# Patient Record
Sex: FEMALE | Race: WHITE | Employment: OTHER | URBAN - METROPOLITAN AREA
[De-identification: names, ages, dates, MRNs, and addresses within clinical notes are randomized per-mention and may not be internally consistent; named-entity substitution may affect disease eponyms.]

---

## 2023-07-25 ENCOUNTER — APPOINTMENT (OUTPATIENT)
Dept: GENERAL RADIOLOGY | Age: 78
DRG: 291 | End: 2023-07-25
Payer: MEDICARE

## 2023-07-25 ENCOUNTER — HOSPITAL ENCOUNTER (INPATIENT)
Age: 78
LOS: 10 days | Discharge: NURSING, REHAB OR LONG-TERM CARE | DRG: 291 | End: 2023-08-04
Attending: STUDENT IN AN ORGANIZED HEALTH CARE EDUCATION/TRAINING PROGRAM | Admitting: STUDENT IN AN ORGANIZED HEALTH CARE EDUCATION/TRAINING PROGRAM
Payer: MEDICARE

## 2023-07-25 ENCOUNTER — APPOINTMENT (OUTPATIENT)
Dept: CT IMAGING | Age: 78
DRG: 291 | End: 2023-07-25
Payer: MEDICARE

## 2023-07-25 DIAGNOSIS — J90 PLEURAL EFFUSION: ICD-10-CM

## 2023-07-25 DIAGNOSIS — I48.91 ATRIAL FIBRILLATION WITH RVR (HCC): ICD-10-CM

## 2023-07-25 DIAGNOSIS — R09.02 HYPOXIA: Primary | ICD-10-CM

## 2023-07-25 PROBLEM — I31.39 PERICARDIAL EFFUSION: Status: ACTIVE | Noted: 2023-07-25

## 2023-07-25 PROBLEM — J96.01 ACUTE RESPIRATORY FAILURE WITH HYPOXIA (HCC): Status: ACTIVE | Noted: 2023-07-25

## 2023-07-25 PROBLEM — R65.10 SIRS (SYSTEMIC INFLAMMATORY RESPONSE SYNDROME) (HCC): Status: ACTIVE | Noted: 2023-07-25

## 2023-07-25 LAB
ALBUMIN SERPL-MCNC: 3.9 G/DL (ref 3.4–5)
ALBUMIN/GLOB SERPL: 1.2 {RATIO} (ref 1.1–2.2)
ALP SERPL-CCNC: 153 U/L (ref 40–129)
ALT SERPL-CCNC: 15 U/L (ref 10–40)
ANION GAP SERPL CALCULATED.3IONS-SCNC: 14 MMOL/L (ref 3–16)
APTT BLD: 29.4 SEC (ref 22.7–35.9)
AST SERPL-CCNC: 20 U/L (ref 15–37)
BACTERIA URNS QL MICRO: ABNORMAL /HPF
BASOPHILS # BLD: 0 K/UL (ref 0–0.2)
BASOPHILS NFR BLD: 0.4 %
BILIRUB SERPL-MCNC: 1 MG/DL (ref 0–1)
BILIRUB UR QL STRIP.AUTO: NEGATIVE
BUN SERPL-MCNC: 22 MG/DL (ref 7–20)
CALCIUM SERPL-MCNC: 9.8 MG/DL (ref 8.3–10.6)
CHLORIDE SERPL-SCNC: 106 MMOL/L (ref 99–110)
CLARITY UR: CLEAR
CO2 SERPL-SCNC: 22 MMOL/L (ref 21–32)
COLOR UR: YELLOW
CREAT SERPL-MCNC: 0.6 MG/DL (ref 0.6–1.2)
DEPRECATED RDW RBC AUTO: 15.5 % (ref 12.4–15.4)
EKG ATRIAL RATE: 227 BPM
EKG DIAGNOSIS: NORMAL
EKG Q-T INTERVAL: 236 MS
EKG QRS DURATION: 70 MS
EKG QTC CALCULATION (BAZETT): 417 MS
EKG R AXIS: 46 DEGREES
EKG T AXIS: 173 DEGREES
EKG VENTRICULAR RATE: 188 BPM
EOSINOPHIL # BLD: 0.1 K/UL (ref 0–0.6)
EOSINOPHIL NFR BLD: 0.6 %
EPI CELLS #/AREA URNS AUTO: 3 /HPF (ref 0–5)
GFR SERPLBLD CREATININE-BSD FMLA CKD-EPI: >60 ML/MIN/{1.73_M2}
GLUCOSE SERPL-MCNC: 129 MG/DL (ref 70–99)
GLUCOSE UR STRIP.AUTO-MCNC: NEGATIVE MG/DL
HCT VFR BLD AUTO: 51.1 % (ref 36–48)
HGB BLD-MCNC: 16.4 G/DL (ref 12–16)
HGB UR QL STRIP.AUTO: ABNORMAL
HYALINE CASTS #/AREA URNS AUTO: 2 /LPF (ref 0–8)
HYALINE CASTS: PRESENT
INR PPP: 1.13 (ref 0.84–1.16)
KETONES UR STRIP.AUTO-MCNC: NEGATIVE MG/DL
LACTATE BLDV-SCNC: 1.6 MMOL/L (ref 0.4–1.9)
LACTATE BLDV-SCNC: 1.8 MMOL/L (ref 0.4–1.9)
LEUKOCYTE ESTERASE UR QL STRIP.AUTO: NEGATIVE
LYMPHOCYTES # BLD: 1.6 K/UL (ref 1–5.1)
LYMPHOCYTES NFR BLD: 17.4 %
MAGNESIUM SERPL-MCNC: 1.8 MG/DL (ref 1.8–2.4)
MCH RBC QN AUTO: 31.6 PG (ref 26–34)
MCHC RBC AUTO-ENTMCNC: 32.2 G/DL (ref 31–36)
MCV RBC AUTO: 98.3 FL (ref 80–100)
MONOCYTES # BLD: 0.7 K/UL (ref 0–1.3)
MONOCYTES NFR BLD: 7.6 %
NEUTROPHILS # BLD: 6.9 K/UL (ref 1.7–7.7)
NEUTROPHILS NFR BLD: 74 %
NITRITE UR QL STRIP.AUTO: NEGATIVE
NT-PROBNP SERPL-MCNC: 1160 PG/ML (ref 0–449)
PH UR STRIP.AUTO: 5.5 [PH] (ref 5–8)
PLATELET # BLD AUTO: 236 K/UL (ref 135–450)
PMV BLD AUTO: 8 FL (ref 5–10.5)
POTASSIUM SERPL-SCNC: 4.2 MMOL/L (ref 3.5–5.1)
PROT SERPL-MCNC: 7.2 G/DL (ref 6.4–8.2)
PROT UR STRIP.AUTO-MCNC: 30 MG/DL
PROTHROMBIN TIME: 14.5 SEC (ref 11.5–14.8)
RBC # BLD AUTO: 5.2 M/UL (ref 4–5.2)
RBC CLUMPS #/AREA URNS AUTO: 18 /HPF (ref 0–4)
SODIUM SERPL-SCNC: 142 MMOL/L (ref 136–145)
SP GR UR STRIP.AUTO: >=1.03 (ref 1–1.03)
TROPONIN, HIGH SENSITIVITY: 19 NG/L (ref 0–14)
TROPONIN, HIGH SENSITIVITY: 20 NG/L (ref 0–14)
UA COMPLETE W REFLEX CULTURE PNL UR: ABNORMAL
UA DIPSTICK W REFLEX MICRO PNL UR: YES
URN SPEC COLLECT METH UR: ABNORMAL
UROBILINOGEN UR STRIP-ACNC: 1 E.U./DL
WBC # BLD AUTO: 9.3 K/UL (ref 4–11)
WBC #/AREA URNS AUTO: 1 /HPF (ref 0–5)

## 2023-07-25 PROCEDURE — 83735 ASSAY OF MAGNESIUM: CPT

## 2023-07-25 PROCEDURE — 6360000002 HC RX W HCPCS: Performed by: STUDENT IN AN ORGANIZED HEALTH CARE EDUCATION/TRAINING PROGRAM

## 2023-07-25 PROCEDURE — 2500000003 HC RX 250 WO HCPCS

## 2023-07-25 PROCEDURE — 99285 EMERGENCY DEPT VISIT HI MDM: CPT

## 2023-07-25 PROCEDURE — 2500000003 HC RX 250 WO HCPCS: Performed by: STUDENT IN AN ORGANIZED HEALTH CARE EDUCATION/TRAINING PROGRAM

## 2023-07-25 PROCEDURE — 2700000000 HC OXYGEN THERAPY PER DAY

## 2023-07-25 PROCEDURE — 84484 ASSAY OF TROPONIN QUANT: CPT

## 2023-07-25 PROCEDURE — 93005 ELECTROCARDIOGRAM TRACING: CPT | Performed by: STUDENT IN AN ORGANIZED HEALTH CARE EDUCATION/TRAINING PROGRAM

## 2023-07-25 PROCEDURE — 83605 ASSAY OF LACTIC ACID: CPT

## 2023-07-25 PROCEDURE — 2580000003 HC RX 258: Performed by: STUDENT IN AN ORGANIZED HEALTH CARE EDUCATION/TRAINING PROGRAM

## 2023-07-25 PROCEDURE — 83880 ASSAY OF NATRIURETIC PEPTIDE: CPT

## 2023-07-25 PROCEDURE — 6360000004 HC RX CONTRAST MEDICATION: Performed by: STUDENT IN AN ORGANIZED HEALTH CARE EDUCATION/TRAINING PROGRAM

## 2023-07-25 PROCEDURE — 1200000000 HC SEMI PRIVATE

## 2023-07-25 PROCEDURE — 6360000002 HC RX W HCPCS: Performed by: PHYSICIAN ASSISTANT

## 2023-07-25 PROCEDURE — 96376 TX/PRO/DX INJ SAME DRUG ADON: CPT

## 2023-07-25 PROCEDURE — 93010 ELECTROCARDIOGRAM REPORT: CPT | Performed by: INTERNAL MEDICINE

## 2023-07-25 PROCEDURE — 94761 N-INVAS EAR/PLS OXIMETRY MLT: CPT

## 2023-07-25 PROCEDURE — 85610 PROTHROMBIN TIME: CPT

## 2023-07-25 PROCEDURE — 6370000000 HC RX 637 (ALT 250 FOR IP): Performed by: NURSE PRACTITIONER

## 2023-07-25 PROCEDURE — 80053 COMPREHEN METABOLIC PANEL: CPT

## 2023-07-25 PROCEDURE — 84443 ASSAY THYROID STIM HORMONE: CPT

## 2023-07-25 PROCEDURE — 85025 COMPLETE CBC W/AUTO DIFF WBC: CPT

## 2023-07-25 PROCEDURE — 96372 THER/PROPH/DIAG INJ SC/IM: CPT

## 2023-07-25 PROCEDURE — 94760 N-INVAS EAR/PLS OXIMETRY 1: CPT

## 2023-07-25 PROCEDURE — 96374 THER/PROPH/DIAG INJ IV PUSH: CPT

## 2023-07-25 PROCEDURE — 94640 AIRWAY INHALATION TREATMENT: CPT

## 2023-07-25 PROCEDURE — 81001 URINALYSIS AUTO W/SCOPE: CPT

## 2023-07-25 PROCEDURE — 36415 COLL VENOUS BLD VENIPUNCTURE: CPT

## 2023-07-25 PROCEDURE — 87641 MR-STAPH DNA AMP PROBE: CPT

## 2023-07-25 PROCEDURE — 87040 BLOOD CULTURE FOR BACTERIA: CPT

## 2023-07-25 PROCEDURE — 71260 CT THORAX DX C+: CPT

## 2023-07-25 PROCEDURE — 85730 THROMBOPLASTIN TIME PARTIAL: CPT

## 2023-07-25 PROCEDURE — 71045 X-RAY EXAM CHEST 1 VIEW: CPT

## 2023-07-25 PROCEDURE — 87449 NOS EACH ORGANISM AG IA: CPT

## 2023-07-25 RX ORDER — MAGNESIUM SULFATE IN WATER 40 MG/ML
2000 INJECTION, SOLUTION INTRAVENOUS PRN
Status: DISCONTINUED | OUTPATIENT
Start: 2023-07-25 | End: 2023-08-04 | Stop reason: HOSPADM

## 2023-07-25 RX ORDER — ENOXAPARIN SODIUM 100 MG/ML
40 INJECTION SUBCUTANEOUS DAILY
Status: DISCONTINUED | OUTPATIENT
Start: 2023-07-26 | End: 2023-07-26

## 2023-07-25 RX ORDER — FUROSEMIDE 10 MG/ML
40 INJECTION INTRAMUSCULAR; INTRAVENOUS 2 TIMES DAILY
Status: DISCONTINUED | OUTPATIENT
Start: 2023-07-25 | End: 2023-07-28

## 2023-07-25 RX ORDER — ACETAMINOPHEN 325 MG/1
650 TABLET ORAL EVERY 6 HOURS PRN
Status: DISCONTINUED | OUTPATIENT
Start: 2023-07-25 | End: 2023-08-04 | Stop reason: HOSPADM

## 2023-07-25 RX ORDER — POTASSIUM CHLORIDE 7.45 MG/ML
10 INJECTION INTRAVENOUS PRN
Status: DISCONTINUED | OUTPATIENT
Start: 2023-07-25 | End: 2023-08-04 | Stop reason: HOSPADM

## 2023-07-25 RX ORDER — DILTIAZEM HYDROCHLORIDE 5 MG/ML
10 INJECTION INTRAVENOUS ONCE
Status: COMPLETED | OUTPATIENT
Start: 2023-07-25 | End: 2023-07-25

## 2023-07-25 RX ORDER — DILTIAZEM HCL IN NACL,ISO-OSM 125 MG/125
2.5-15 PLASTIC BAG, INJECTION (ML) INTRAVENOUS CONTINUOUS
Status: DISCONTINUED | OUTPATIENT
Start: 2023-07-25 | End: 2023-08-02

## 2023-07-25 RX ORDER — POTASSIUM CHLORIDE 20 MEQ/1
40 TABLET, EXTENDED RELEASE ORAL PRN
Status: DISCONTINUED | OUTPATIENT
Start: 2023-07-25 | End: 2023-08-04 | Stop reason: HOSPADM

## 2023-07-25 RX ORDER — ONDANSETRON 4 MG/1
4 TABLET, ORALLY DISINTEGRATING ORAL EVERY 8 HOURS PRN
Status: DISCONTINUED | OUTPATIENT
Start: 2023-07-25 | End: 2023-08-04 | Stop reason: HOSPADM

## 2023-07-25 RX ORDER — DILTIAZEM HYDROCHLORIDE 5 MG/ML
INJECTION INTRAVENOUS
Status: COMPLETED
Start: 2023-07-25 | End: 2023-07-25

## 2023-07-25 RX ORDER — ONDANSETRON 2 MG/ML
4 INJECTION INTRAMUSCULAR; INTRAVENOUS EVERY 6 HOURS PRN
Status: DISCONTINUED | OUTPATIENT
Start: 2023-07-25 | End: 2023-08-04 | Stop reason: HOSPADM

## 2023-07-25 RX ORDER — POLYETHYLENE GLYCOL 3350 17 G/17G
17 POWDER, FOR SOLUTION ORAL DAILY PRN
Status: DISCONTINUED | OUTPATIENT
Start: 2023-07-25 | End: 2023-08-04 | Stop reason: HOSPADM

## 2023-07-25 RX ORDER — ACETAMINOPHEN 650 MG/1
650 SUPPOSITORY RECTAL EVERY 6 HOURS PRN
Status: DISCONTINUED | OUTPATIENT
Start: 2023-07-25 | End: 2023-08-04 | Stop reason: HOSPADM

## 2023-07-25 RX ORDER — SODIUM CHLORIDE 0.9 % (FLUSH) 0.9 %
5-40 SYRINGE (ML) INJECTION EVERY 12 HOURS SCHEDULED
Status: DISCONTINUED | OUTPATIENT
Start: 2023-07-25 | End: 2023-08-04 | Stop reason: HOSPADM

## 2023-07-25 RX ORDER — IPRATROPIUM BROMIDE AND ALBUTEROL SULFATE 2.5; .5 MG/3ML; MG/3ML
1 SOLUTION RESPIRATORY (INHALATION) EVERY 4 HOURS PRN
Status: DISCONTINUED | OUTPATIENT
Start: 2023-07-25 | End: 2023-07-25

## 2023-07-25 RX ORDER — ENOXAPARIN SODIUM 100 MG/ML
1 INJECTION SUBCUTANEOUS ONCE
Status: COMPLETED | OUTPATIENT
Start: 2023-07-25 | End: 2023-07-25

## 2023-07-25 RX ORDER — SODIUM CHLORIDE 0.9 % (FLUSH) 0.9 %
5-40 SYRINGE (ML) INJECTION PRN
Status: DISCONTINUED | OUTPATIENT
Start: 2023-07-25 | End: 2023-08-04 | Stop reason: HOSPADM

## 2023-07-25 RX ORDER — SODIUM CHLORIDE 9 MG/ML
INJECTION, SOLUTION INTRAVENOUS PRN
Status: DISCONTINUED | OUTPATIENT
Start: 2023-07-25 | End: 2023-08-04 | Stop reason: HOSPADM

## 2023-07-25 RX ORDER — IPRATROPIUM BROMIDE AND ALBUTEROL SULFATE 2.5; .5 MG/3ML; MG/3ML
1 SOLUTION RESPIRATORY (INHALATION)
Status: DISCONTINUED | OUTPATIENT
Start: 2023-07-26 | End: 2023-07-28

## 2023-07-25 RX ORDER — ALBUTEROL SULFATE 2.5 MG/3ML
2.5 SOLUTION RESPIRATORY (INHALATION)
Status: DISCONTINUED | OUTPATIENT
Start: 2023-07-25 | End: 2023-08-04 | Stop reason: HOSPADM

## 2023-07-25 RX ADMIN — DILTIAZEM HYDROCHLORIDE 10 MG: 5 INJECTION INTRAVENOUS at 16:51

## 2023-07-25 RX ADMIN — IOPAMIDOL 75 ML: 755 INJECTION, SOLUTION INTRAVENOUS at 17:14

## 2023-07-25 RX ADMIN — VANCOMYCIN HYDROCHLORIDE 750 MG: 750 INJECTION, POWDER, LYOPHILIZED, FOR SOLUTION INTRAVENOUS at 22:32

## 2023-07-25 RX ADMIN — ENOXAPARIN SODIUM 50 MG: 100 INJECTION SUBCUTANEOUS at 18:20

## 2023-07-25 RX ADMIN — IPRATROPIUM BROMIDE AND ALBUTEROL SULFATE 1 DOSE: .5; 3 SOLUTION RESPIRATORY (INHALATION) at 21:53

## 2023-07-25 RX ADMIN — DILTIAZEM HYDROCHLORIDE 10 MG: 5 INJECTION INTRAVENOUS at 15:11

## 2023-07-25 RX ADMIN — Medication 10 ML: at 20:53

## 2023-07-25 RX ADMIN — CEFTRIAXONE SODIUM 1000 MG: 1 INJECTION, POWDER, FOR SOLUTION INTRAMUSCULAR; INTRAVENOUS at 19:30

## 2023-07-25 RX ADMIN — AZITHROMYCIN MONOHYDRATE 500 MG: 500 INJECTION, POWDER, LYOPHILIZED, FOR SOLUTION INTRAVENOUS at 20:06

## 2023-07-25 RX ADMIN — Medication 5 MG/HR: at 15:13

## 2023-07-25 RX ADMIN — FUROSEMIDE 40 MG: 10 INJECTION, SOLUTION INTRAMUSCULAR; INTRAVENOUS at 20:53

## 2023-07-25 ASSESSMENT — PAIN - FUNCTIONAL ASSESSMENT: PAIN_FUNCTIONAL_ASSESSMENT: 0-10

## 2023-07-25 ASSESSMENT — PAIN SCALES - GENERAL: PAINLEVEL_OUTOF10: 0

## 2023-07-25 NOTE — ED NOTES
ED TO INPATIENT SBAR HANDOFF    Patient Name: Oneita Gowers   :  1945  69 y.o. MRN:  [id number]  Preferred Name  [facility]  ED Room #:  ED-0030/30  Family/Caregiver Present no   Restraints no   Sitter no   Sepsis Risk Score Sepsis Risk Score: 3.71    Situation  Code Status: Full Code No additional code details. Allergies: Patient has no known allergies. Weight: Patient Vitals for the past 96 hrs (Last 3 readings):   Weight   23 1432 110 lb (49.9 kg)     Arrived from: home  Chief Complaint:   Chief Complaint   Patient presents with    Shortness of Breath     SOB for approx 3 week & swelling to right leg for approx 3 days. Denies cardiac/pulm hx. Noted afib RVR in triage. Hospital Problem/Diagnosis:  Principal Problem:    Acute respiratory failure with hypoxia (HCC)  Active Problems:    Atrial fibrillation with RVR (HCC)    SIRS (systemic inflammatory response syndrome) ([facility])    Pleural effusion    Pericardial effusion  Resolved Problems:    * No resolved hospital problems. *    Imaging:   CT CHEST PULMONARY EMBOLISM W CONTRAST   Final Result   1. No definite evidence for pulmonary emboli. 2. Large right pleural effusion with consolidation most pronounced in the   right lower lobe. 3. Moderate-sized pericardial effusion. 4. Extensive anasarca. 5. Old fracture of the sternum and appearance of chronic compressions of the   thoracic spine. Comparison with pain is recommended. XR CHEST PORTABLE   Final Result   Moderate right pleural effusion.          VL Extremity Venous Bilateral    (Results Pending)     Abnormal labs:   Abnormal Labs Reviewed   CBC WITH AUTO DIFFERENTIAL - Abnormal; Notable for the following components:       Result Value    Hemoglobin 16.4 (*)     Hematocrit 51.1 (*)     RDW 15.5 (*)     All other components within normal limits   COMPREHENSIVE METABOLIC PANEL W/ REFLEX TO MG FOR LOW K - Abnormal; Notable for the following components:    Glucose 129 (*)

## 2023-07-25 NOTE — H&P
Hospital Medicine History & Physical        Name:  Lenka Roland  /Age/Sex: 1945  (69 y.o. female)  MRN & CSN:  [id number] & [id number]     PCP: No primary care provider on file. Date of Admission: 2023    Date of Service: Pt seen/examined on 2023    Patient Status:  Inpatient - Patient will most likely require more than 48 hours of treatment and requires intensive medical treatment/monitoring     Chief Complaint:    Chief Complaint   Patient presents with    Shortness of Breath     SOB for approx 3 week & swelling to right leg for approx 3 days. Denies cardiac/pulm hx. Noted afib RVR in triage. History Of Present Illness:     69 y.o. female with no significant past no history who presented to [facility] with complaints of shortness of breath. Patient states for the last 2-3 weeks she has been having progressively worsening shortness of breath. She has never had anything like this happen to her before. She states she noticed her breathing trouble started when the [place] were at their peak in late . Additionally, patient states used to smoke 1/2 pack/day, but at that time when she started to notice her shortness of breath, she decided to quit smoking as well. Additionally, she states that she has had bilateral leg swelling, which is new for her as well. No history of blood clots. No history of heart failure or MIs. Additionally, patient was found to be in atrial fibrillation with RVR in the ED. Patient has never had atrial fibrillation in the past.  Not on any blood thinners or antiarrhythmics. Patient denies chest pain, nausea, vomiting, GI/ symptoms, edema, fever, or chills. Former smoker. Quit 4 weeks ago. Used to smoke 1 pack/day for 50+ years. Denies alcohol or illicit drug use. Past Medical History:      History reviewed. No pertinent past medical history. Past Surgical History:      History reviewed.  No pertinent sodium chloride 0.9 % 250 mL IVPB ([id number])  15 mg/kg IntraVENous Once Fely Armstrong MD        perflutren lipid microspheres (DEFINITY) injection 1.5 mL  1.5 mL IntraVENous ONCE PRN Malaika Pérez DO        [START ON 7/26/2023] cefTRIAXone (ROCEPHIN) 1,000 mg in sodium chloride 0.9 % 50 mL IVPB (mini-bag)  1,000 mg IntraVENous Q24H Malaika Pérez DO        And    [START ON 7/26/2023] azithromycin (ZITHROMAX) 500 mg in sodium chloride 0.9 % 250 mL IVPB ([id number])  500 mg IntraVENous Q24H Christian Terlep, DO        sodium chloride flush 0.9 % injection 5-40 mL  5-40 mL IntraVENous 2 times per day Malaika Pérez, DO        sodium chloride flush 0.9 % injection 5-40 mL  5-40 mL IntraVENous PRN Christian Terlep, DO        0.9 % sodium chloride infusion   IntraVENous PRN Christian Soaresp, DO        enoxaparin (LOVENOX) injection 40 mg  40 mg SubCUTAneous Daily Christian Terlemarisol, DO        ondansetron (ZOFRAN-ODT) disintegrating tablet 4 mg  4 mg Oral Q8H PRN Malaika Pérez, DO        Or    ondansetron (ZOFRAN) injection 4 mg  4 mg IntraVENous Q6H PRN Malaika Pérez, DO        polyethylene glycol (GLYCOLAX) packet 17 g  17 g Oral Daily PRN Malaika Pérez, DO        acetaminophen (TYLENOL) tablet 650 mg  650 mg Oral Q6H PRN Malaika Pérez, DO        Or    acetaminophen (TYLENOL) suppository 650 mg  650 mg Rectal Q6H PRN Malaika Pérez, DO        magnesium sulfate 2000 mg in 50 mL IVPB premix  2,000 mg IntraVENous PRN Malaika Pérez, DO        potassium chloride (KLOR-CON M) extended release tablet 40 mEq  40 mEq Oral PRN Malaika Pérez, DO        Or    potassium bicarb-citric acid (EFFER-K) effervescent tablet 40 mEq  40 mEq Oral PRN Malaika Pérez, DO        Or    potassium chloride 10 mEq/100 mL IVPB (Peripheral Line)  10 mEq IntraVENous PRN Malaika Pérez, DO         No current outpatient medications on file.        Consults:    IP CONSULT TO HOSPITALIST  IP CONSULT TO CARDIOLOGY    ASSESSMENT:    Active Hospital Problems    Diagnosis Date

## 2023-07-25 NOTE — ED PROVIDER NOTES
Non-plain film images such as CT, Ultrasound and MRI are read by the radiologist. Plain radiographic images are visualized and preliminarily interpreted by the ED Provider with the below findings:        Interpretation per the Radiologist below, if available at the time of this note:    CT CHEST PULMONARY EMBOLISM W CONTRAST   Final Result   1. No definite evidence for pulmonary emboli. 2. Large right pleural effusion with consolidation most pronounced in the   right lower lobe. 3. Moderate-sized pericardial effusion. 4. Extensive anasarca. 5. Old fracture of the sternum and appearance of chronic compressions of the   thoracic spine. Comparison with pain is recommended. XR CHEST PORTABLE   Final Result   Moderate right pleural effusion. No results found. No results found. PROCEDURES   Unless otherwise noted below, none     Procedures    CRITICAL CARE TIME (.cctime)       PAST MEDICAL HISTORY      has no past medical history on file.      EMERGENCY DEPARTMENT COURSE and DIFFERENTIAL DIAGNOSIS/MDM:   Vitals:    Vitals:    07/25/23 1819 07/25/23 1829 07/25/23 1849 07/25/23 1901   BP: (!) 140/100 (!) 120/95 (!) 120/91    Pulse: (!) 104 (!) 105 95 (!) 104   Resp: (!) 36 25 28 27   Temp:       SpO2: 94% 96% 97% (!) 72%   Weight:           Patient was given the following medications:  Medications   dilTIAZem injection 10 mg (10 mg IntraVENous Given 7/25/23 1511)     Followed by   dilTIAZem HCl-Sodium Chloride 125 mg / 125 mL infusion (12.5 mg/hr IntraVENous Rate/Dose Change 7/25/23 1821)   cefTRIAXone (ROCEPHIN) 1,000 mg in sodium chloride 0.9 % 50 mL IVPB (mini-bag) (has no administration in time range)     And   azithromycin (ZITHROMAX) 500 mg in sodium chloride 0.9 % 250 mL IVPB ([id number]) (has no administration in time range)   vancomycin (VANCOCIN) 750 mg in sodium chloride 0.9 % 250 mL IVPB (Kiab0Ekf) (has no administration in time range)   enoxaparin (LOVENOX) injection 50 mg (50 mg fibrillation with RVR ([address])    3. Pleural effusion          DISPOSITION/PLAN     DISPOSITION Decision To Admit 07/25/2023 07:00:13 PM      PATIENT REFERRED TO:  No follow-up provider specified.     DISCHARGE MEDICATIONS:  New Prescriptions    No medications on file       DISCONTINUED MEDICATIONS:  Discontinued Medications    No medications on file              (Please note that portions of this note were completed with a voice recognition program.  Efforts were made to edit the dictations but occasionally words are mis-transcribed.)    Marylee Moan, PA-C (electronically signed)       Marylee Moan, PA-C  07/25/23 1911

## 2023-07-25 NOTE — ACP (ADVANCE CARE PLANNING)
Advanced Care Planning Note. Purpose of Encounter: Advanced care planning in light of shortness of breath. Parties In Attendance: Patient,   Decisional Capacity: Yes  Subjective: Shortness of breath. Objective: BNP 1160  Goals of Care Determination: Patient/POA wishes to seek full treatment. Plan:  Echo. Cardiology consult. Sepsis work up. Code Status: Full code. Time spent on Advanced care Plannin minutes  Advanced Care Planning Documents: Completed advanced directives on chart, sister, Jonathan Braden, is the POA. Call odilia Villalobos at [phone] to get ahold of sister, as the sister is very hard of hearing.     Kaitlynn Keyes DO  2023 7:27 PM

## 2023-07-25 NOTE — ED NOTES
[facility]  1946  [id number]    Situation:  Verbal report received from: Gogo Al  Procedure: Procedure(s):  COLONOSCOPY    Background:    Preoperative diagnosis: DIVERTICULAR DISEASE OF COLON  Postoperative diagnosis: Diverticulossis    :  Dr. Schrader Daily  Assistant(s): Endoscopy RN-1: Wanda Shelton RN    Specimens: * No specimens in log *  H. Pylori  no    Assessment:  Intra-procedure medications   Anesthesia gave intra-procedure sedation and medications, see anesthesia flow sheet yes    Intravenous fluids: NS@ KVO     Vital signs stable     Abdominal assessment: round and soft     Recommendation:    Family or Friend   Permission to share finding with family or friend yes Pt c/o SOB for 3 weeks and right swelling for 3 days. Patient alert and oriented x4. GCS 15/15. Skin appropriate for ethnicity, dry and intact. No signs of acute distress noted at this time. Regular respiratory pattern, normal respiratory depth, unlabored respirations. denies pain. Cardiac Rhythm AFIB RVR. Pt is on a continuous pulse oximetry and telemetry monitoring. Bedside Monitor on with Alarms audible and alarms set. Pt continued on cycling blood pressure. Fall risk precautions in place, call light in reach, bed side table within reach, will continue to monitor.          Tatianna Santacruz RN  07/25/23 8740

## 2023-07-26 PROBLEM — J43.2 CENTRILOBULAR EMPHYSEMA (HCC): Status: ACTIVE | Noted: 2023-07-26

## 2023-07-26 PROBLEM — J81.0 ACUTE PULMONARY EDEMA (HCC): Status: ACTIVE | Noted: 2023-07-26

## 2023-07-26 LAB
ALBUMIN SERPL-MCNC: 3.5 G/DL (ref 3.4–5)
ANION GAP SERPL CALCULATED.3IONS-SCNC: 12 MMOL/L (ref 3–16)
BASE EXCESS BLDA CALC-SCNC: -12 MMOL/L (ref -3–3)
BASOPHILS # BLD: 0 K/UL (ref 0–0.2)
BASOPHILS NFR BLD: 0.3 %
BUN SERPL-MCNC: 20 MG/DL (ref 7–20)
CA-I BLD-SCNC: 1.27 MMOL/L (ref 1.12–1.32)
CALCIUM SERPL-MCNC: 8.8 MG/DL (ref 8.3–10.6)
CHLORIDE SERPL-SCNC: 108 MMOL/L (ref 99–110)
CO2 BLDA-SCNC: 20 MMOL/L
CO2 SERPL-SCNC: 22 MMOL/L (ref 21–32)
CREAT SERPL-MCNC: 0.6 MG/DL (ref 0.6–1.2)
DEPRECATED RDW RBC AUTO: 15.4 % (ref 12.4–15.4)
EOSINOPHIL # BLD: 0 K/UL (ref 0–0.6)
EOSINOPHIL NFR BLD: 0.2 %
GFR SERPLBLD CREATININE-BSD FMLA CKD-EPI: >60 ML/MIN/{1.73_M2}
GLUCOSE BLD-MCNC: 178 MG/DL (ref 70–99)
GLUCOSE BLD-MCNC: 229 MG/DL (ref 70–99)
GLUCOSE SERPL-MCNC: 107 MG/DL (ref 70–99)
HCO3 BLDA-SCNC: 17.8 MMOL/L (ref 21–29)
HCT VFR BLD AUTO: 45.3 % (ref 36–48)
HCT VFR BLD AUTO: 55 % (ref 36–48)
HGB BLD CALC-MCNC: 18.8 GM/DL (ref 12–16)
HGB BLD-MCNC: 14.8 G/DL (ref 12–16)
LACTATE BLD-SCNC: 5.75 MMOL/L (ref 0.4–2)
LV EF: 45 %
LVEF MODALITY: NORMAL
LYMPHOCYTES # BLD: 1.9 K/UL (ref 1–5.1)
LYMPHOCYTES NFR BLD: 19.5 %
MAGNESIUM SERPL-MCNC: 1.7 MG/DL (ref 1.8–2.4)
MCH RBC QN AUTO: 32.1 PG (ref 26–34)
MCHC RBC AUTO-ENTMCNC: 32.6 G/DL (ref 31–36)
MCV RBC AUTO: 98.5 FL (ref 80–100)
MONOCYTES # BLD: 0.9 K/UL (ref 0–1.3)
MONOCYTES NFR BLD: 9.5 %
NEUTROPHILS # BLD: 6.7 K/UL (ref 1.7–7.7)
NEUTROPHILS NFR BLD: 70.5 %
PCO2 BLDA: 57.7 MM HG (ref 35–45)
PERFORMED ON: ABNORMAL
PERFORMED ON: ABNORMAL
PH BLDA: 7.1 [PH] (ref 7.35–7.45)
PHOSPHATE SERPL-MCNC: 4.1 MG/DL (ref 2.5–4.9)
PLATELET # BLD AUTO: 212 K/UL (ref 135–450)
PMV BLD AUTO: 7.6 FL (ref 5–10.5)
PO2 BLDA: 106.9 MM HG (ref 75–108)
POC SAMPLE TYPE: ABNORMAL
POTASSIUM BLD-SCNC: 4.8 MMOL/L (ref 3.5–5.1)
POTASSIUM SERPL-SCNC: 3.9 MMOL/L (ref 3.5–5.1)
PROCALCITONIN SERPL IA-MCNC: 0.07 NG/ML (ref 0–0.15)
RBC # BLD AUTO: 4.6 M/UL (ref 4–5.2)
SAO2 % BLDA: 96 % (ref 93–100)
SODIUM BLD-SCNC: 137 MMOL/L (ref 136–145)
SODIUM SERPL-SCNC: 142 MMOL/L (ref 136–145)
TSH SERPL DL<=0.005 MIU/L-ACNC: 1.79 UIU/ML (ref 0.27–4.2)
WBC # BLD AUTO: 9.5 K/UL (ref 4–11)

## 2023-07-26 PROCEDURE — 36415 COLL VENOUS BLD VENIPUNCTURE: CPT

## 2023-07-26 PROCEDURE — 83036 HEMOGLOBIN GLYCOSYLATED A1C: CPT

## 2023-07-26 PROCEDURE — 6370000000 HC RX 637 (ALT 250 FOR IP): Performed by: INTERNAL MEDICINE

## 2023-07-26 PROCEDURE — 85014 HEMATOCRIT: CPT

## 2023-07-26 PROCEDURE — 6360000002 HC RX W HCPCS: Performed by: STUDENT IN AN ORGANIZED HEALTH CARE EDUCATION/TRAINING PROGRAM

## 2023-07-26 PROCEDURE — 84145 PROCALCITONIN (PCT): CPT

## 2023-07-26 PROCEDURE — 6370000000 HC RX 637 (ALT 250 FOR IP): Performed by: NURSE PRACTITIONER

## 2023-07-26 PROCEDURE — 89051 BODY FLUID CELL COUNT: CPT

## 2023-07-26 PROCEDURE — 99223 1ST HOSP IP/OBS HIGH 75: CPT | Performed by: INTERNAL MEDICINE

## 2023-07-26 PROCEDURE — 97166 OT EVAL MOD COMPLEX 45 MIN: CPT

## 2023-07-26 PROCEDURE — 97530 THERAPEUTIC ACTIVITIES: CPT

## 2023-07-26 PROCEDURE — 2000000000 HC ICU R&B

## 2023-07-26 PROCEDURE — 2580000003 HC RX 258: Performed by: STUDENT IN AN ORGANIZED HEALTH CARE EDUCATION/TRAINING PROGRAM

## 2023-07-26 PROCEDURE — 97535 SELF CARE MNGMENT TRAINING: CPT

## 2023-07-26 PROCEDURE — 85025 COMPLETE CBC W/AUTO DIFF WBC: CPT

## 2023-07-26 PROCEDURE — 82947 ASSAY GLUCOSE BLOOD QUANT: CPT

## 2023-07-26 PROCEDURE — 82803 BLOOD GASES ANY COMBINATION: CPT

## 2023-07-26 PROCEDURE — 82330 ASSAY OF CALCIUM: CPT

## 2023-07-26 PROCEDURE — 93970 EXTREMITY STUDY: CPT

## 2023-07-26 PROCEDURE — 2700000000 HC OXYGEN THERAPY PER DAY

## 2023-07-26 PROCEDURE — 6360000002 HC RX W HCPCS: Performed by: NURSE PRACTITIONER

## 2023-07-26 PROCEDURE — 83735 ASSAY OF MAGNESIUM: CPT

## 2023-07-26 PROCEDURE — 80069 RENAL FUNCTION PANEL: CPT

## 2023-07-26 PROCEDURE — 94640 AIRWAY INHALATION TREATMENT: CPT

## 2023-07-26 PROCEDURE — 5A09357 ASSISTANCE WITH RESPIRATORY VENTILATION, LESS THAN 24 CONSECUTIVE HOURS, CONTINUOUS POSITIVE AIRWAY PRESSURE: ICD-10-PCS | Performed by: STUDENT IN AN ORGANIZED HEALTH CARE EDUCATION/TRAINING PROGRAM

## 2023-07-26 PROCEDURE — 94761 N-INVAS EAR/PLS OXIMETRY MLT: CPT

## 2023-07-26 PROCEDURE — 6370000000 HC RX 637 (ALT 250 FOR IP): Performed by: STUDENT IN AN ORGANIZED HEALTH CARE EDUCATION/TRAINING PROGRAM

## 2023-07-26 PROCEDURE — 93306 TTE W/DOPPLER COMPLETE: CPT

## 2023-07-26 PROCEDURE — 99222 1ST HOSP IP/OBS MODERATE 55: CPT | Performed by: INTERNAL MEDICINE

## 2023-07-26 PROCEDURE — 94660 CPAP INITIATION&MGMT: CPT

## 2023-07-26 PROCEDURE — 2500000003 HC RX 250 WO HCPCS: Performed by: STUDENT IN AN ORGANIZED HEALTH CARE EDUCATION/TRAINING PROGRAM

## 2023-07-26 PROCEDURE — 6360000002 HC RX W HCPCS: Performed by: INTERNAL MEDICINE

## 2023-07-26 PROCEDURE — 83605 ASSAY OF LACTIC ACID: CPT

## 2023-07-26 PROCEDURE — 84132 ASSAY OF SERUM POTASSIUM: CPT

## 2023-07-26 PROCEDURE — 97162 PT EVAL MOD COMPLEX 30 MIN: CPT

## 2023-07-26 PROCEDURE — 84295 ASSAY OF SERUM SODIUM: CPT

## 2023-07-26 RX ORDER — METOPROLOL TARTRATE 50 MG/1
50 TABLET, FILM COATED ORAL 2 TIMES DAILY
Status: DISCONTINUED | OUTPATIENT
Start: 2023-07-26 | End: 2023-08-04 | Stop reason: HOSPADM

## 2023-07-26 RX ORDER — DILTIAZEM HYDROCHLORIDE 60 MG/1
60 CAPSULE, EXTENDED RELEASE ORAL 2 TIMES DAILY
Status: DISCONTINUED | OUTPATIENT
Start: 2023-07-26 | End: 2023-07-31

## 2023-07-26 RX ORDER — DIGOXIN 0.25 MG/ML
125 INJECTION INTRAMUSCULAR; INTRAVENOUS ONCE
Status: DISCONTINUED | OUTPATIENT
Start: 2023-07-26 | End: 2023-07-26

## 2023-07-26 RX ORDER — ENOXAPARIN SODIUM 100 MG/ML
1 INJECTION SUBCUTANEOUS 2 TIMES DAILY
Status: DISCONTINUED | OUTPATIENT
Start: 2023-07-26 | End: 2023-07-28

## 2023-07-26 RX ORDER — DIPHENHYDRAMINE HYDROCHLORIDE 50 MG/ML
25 INJECTION INTRAMUSCULAR; INTRAVENOUS ONCE
Status: COMPLETED | OUTPATIENT
Start: 2023-07-26 | End: 2023-07-26

## 2023-07-26 RX ORDER — NOREPINEPHRINE BITARTRATE 0.06 MG/ML
1-100 INJECTION, SOLUTION INTRAVENOUS CONTINUOUS
Status: DISCONTINUED | OUTPATIENT
Start: 2023-07-27 | End: 2023-07-28

## 2023-07-26 RX ADMIN — IPRATROPIUM BROMIDE AND ALBUTEROL SULFATE 1 DOSE: .5; 3 SOLUTION RESPIRATORY (INHALATION) at 01:45

## 2023-07-26 RX ADMIN — ENOXAPARIN SODIUM 60 MG: 100 INJECTION SUBCUTANEOUS at 20:42

## 2023-07-26 RX ADMIN — ENOXAPARIN SODIUM 40 MG: 100 INJECTION SUBCUTANEOUS at 09:17

## 2023-07-26 RX ADMIN — DIPHENHYDRAMINE HYDROCHLORIDE 25 MG: 50 INJECTION, SOLUTION INTRAMUSCULAR; INTRAVENOUS at 23:03

## 2023-07-26 RX ADMIN — IPRATROPIUM BROMIDE AND ALBUTEROL SULFATE 1 DOSE: .5; 3 SOLUTION RESPIRATORY (INHALATION) at 22:31

## 2023-07-26 RX ADMIN — IPRATROPIUM BROMIDE AND ALBUTEROL SULFATE 1 DOSE: .5; 3 SOLUTION RESPIRATORY (INHALATION) at 20:26

## 2023-07-26 RX ADMIN — IPRATROPIUM BROMIDE AND ALBUTEROL SULFATE 1 DOSE: .5; 3 SOLUTION RESPIRATORY (INHALATION) at 05:20

## 2023-07-26 RX ADMIN — Medication 10 ML: at 09:17

## 2023-07-26 RX ADMIN — AZITHROMYCIN MONOHYDRATE 500 MG: 500 INJECTION, POWDER, LYOPHILIZED, FOR SOLUTION INTRAVENOUS at 21:33

## 2023-07-26 RX ADMIN — Medication 10 MG/HR: at 18:11

## 2023-07-26 RX ADMIN — CEFTRIAXONE SODIUM 1000 MG: 1 INJECTION, POWDER, FOR SOLUTION INTRAMUSCULAR; INTRAVENOUS at 20:38

## 2023-07-26 RX ADMIN — METOPROLOL TARTRATE 50 MG: 50 TABLET ORAL at 20:42

## 2023-07-26 RX ADMIN — Medication 10 ML: at 20:44

## 2023-07-26 RX ADMIN — METOPROLOL TARTRATE 50 MG: 50 TABLET ORAL at 12:05

## 2023-07-26 RX ADMIN — Medication 5 MG/HR: at 02:01

## 2023-07-26 RX ADMIN — DILTIAZEM HYDROCHLORIDE 60 MG: 60 CAPSULE, EXTENDED RELEASE ORAL at 14:17

## 2023-07-26 RX ADMIN — IPRATROPIUM BROMIDE AND ALBUTEROL SULFATE 1 DOSE: .5; 3 SOLUTION RESPIRATORY (INHALATION) at 15:10

## 2023-07-26 RX ADMIN — IPRATROPIUM BROMIDE AND ALBUTEROL SULFATE 1 DOSE: .5; 3 SOLUTION RESPIRATORY (INHALATION) at 11:53

## 2023-07-26 RX ADMIN — FUROSEMIDE 40 MG: 10 INJECTION, SOLUTION INTRAMUSCULAR; INTRAVENOUS at 09:15

## 2023-07-26 RX ADMIN — DILTIAZEM HYDROCHLORIDE 60 MG: 60 CAPSULE, EXTENDED RELEASE ORAL at 20:42

## 2023-07-26 RX ADMIN — FUROSEMIDE 40 MG: 10 INJECTION, SOLUTION INTRAMUSCULAR; INTRAVENOUS at 16:36

## 2023-07-26 ASSESSMENT — PAIN SCALES - GENERAL
PAINLEVEL_OUTOF10: 0

## 2023-07-26 NOTE — PLAN OF CARE
Problem: Discharge Planning  Goal: Discharge to home or other facility with appropriate resources  7/26/2023 1021 by Jaswinder Porter RN  Outcome: Progressing     Problem: Safety - Adult  Goal: Free from fall injury  7/26/2023 1021 by Jaswinder Porter RN  Outcome: Progressing     Problem: Cardiovascular - Adult  Goal: Maintains optimal cardiac output and hemodynamic stability  7/26/2023 1021 by Jaswinder Porter RN  Outcome: Progressing     Problem: Cardiovascular - Adult  Goal: Absence of cardiac dysrhythmias or at baseline  7/26/2023 1021 by Jaswinder Porter RN  Outcome: Progressing     Problem: Musculoskeletal - Adult  Goal: Return mobility to safest level of function  7/26/2023 1021 by Jaswinder Porter RN  Outcome: Progressing     Problem: Respiratory - Adult  Goal: Achieves optimal ventilation and oxygenation  7/26/2023 1021 by Jaswinder Porter RN  Outcome: Progressing

## 2023-07-26 NOTE — CONSULTS
[facility] Pulmonary and Critical Care   Consult Note      Reason for Consult: Shortness of breath, pleural effusion  Requesting Physician: Dr. Bari Desai  Subjective:   [address] / HPI:                The patient is a 69 y.o. female with significant past medical history of:  History reviewed. No pertinent past medical history. The patient presented to the hospital with chief complaint of increasingly severe shortness of breath. She states she is short of breath with almost any level of activity. She notes this has been going on for the last 1 month. She is not really complaining of any cough or hemoptysis. She is a smoker and quit smoking about 1 month ago. She states she smoked about 1/2 pack/day up until that time. She does not take any inhaled medication and does not use oxygen at home      Past Surgical History:    History reviewed. No pertinent surgical history.   Current Medications:    Current Facility-Administered Medications: metoprolol tartrate (LOPRESSOR) tablet 50 mg, 50 mg, Oral, BID  enoxaparin (LOVENOX) injection 60 mg, 1 mg/kg (Adjusted), SubCUTAneous, BID  dilTIAZem (CARDIZEM 12 HR) extended release capsule 60 mg, 60 mg, Oral, BID  [COMPLETED] dilTIAZem injection 10 mg, 10 mg, IntraVENous, Once **FOLLOWED BY** dilTIAZem HCl-Sodium Chloride 125 mg / 125 mL infusion, 2.5-15 mg/hr, IntraVENous, Continuous  perflutren lipid microspheres (DEFINITY) injection 1.5 mL, 1.5 mL, IntraVENous, ONCE PRN  cefTRIAXone (ROCEPHIN) 1,000 mg in sodium chloride 0.9 % 50 mL IVPB (mini-bag), 1,000 mg, IntraVENous, Q24H **AND** azithromycin (ZITHROMAX) 500 mg in sodium chloride 0.9 % 250 mL IVPB (Oglc4Ypy), 500 mg, IntraVENous, Q24H  sodium chloride flush 0.9 % injection 5-40 mL, 5-40 mL, IntraVENous, 2 times per day  sodium chloride flush 0.9 % injection 5-40 mL, 5-40 mL, IntraVENous, PRN  0.9 % sodium chloride infusion, , IntraVENous, PRN  ondansetron (ZOFRAN-ODT) disintegrating tablet 4 mg, 4 mg, Oral, Q8H PRN FREE:[LAST:[Jajaano],FIRST:[Josue],PHONE:[phone]],FAX:[(   )    -],ADDRESS:[[address] [place] [address]]],PROVIDER:[TOKEN:[[id number]]]

## 2023-07-26 NOTE — PROGRESS NOTES
07/25/23 2205   RT Protocol   History Pulmonary Disease 1   Respiratory pattern 6   Breath sounds 6   Cough 0   Indications for Bronchodilator Therapy Decreased or absent breath sounds   Bronchodilator Assessment Score 13

## 2023-07-26 NOTE — ED NOTES
Report given to [facility], RN and answered all questions.       Gissell Serrano RN  07/25/23 2012

## 2023-07-26 NOTE — CARE COORDINATION
Case Management Assessment  Initial Evaluation    Date/Time of Evaluation: 7/26/2023 3:48 PM  Assessment Completed by: Kirsten Bone RN    If patient is discharged prior to next notation, then this note serves as note for discharge by case management. Patient Name: Oneita Gowers                   YOB: 1945  Diagnosis: Pleural effusion [J90]  Hypoxia [R09.02]  Atrial fibrillation with RVR ([address]) [I48.91]  Acute respiratory failure with hypoxia ([address]) [J96.01]                   Date / Time: 7/25/2023  2:22 PM    Patient Admission Status: Inpatient   Readmission Risk (Low < 19, Mod (19-27), High > 27): Readmission Risk Score: 8.1    Current PCP: No primary care provider on file. PCP verified by CM? No (patient does not have a PCP, given brochure)    Chart Reviewed: Yes      History Provided by:  patient  Patient Orientation: Alert and Oriented    Patient Cognition: Alert    Hospitalization in the last 30 days (Readmission):  No    If yes, Readmission Assessment in  Navigator will be completed. Advance Directives:      Code Status: Full Code   Patient's Primary Decision Maker is: Legal Next of Kin      Discharge Planning:    Patient lives with: Alone Type of Home: House  Primary Care Giver: Self  Patient Support Systems include: Family Members   Current Financial resources: Medicare  Current community resources: None  Current services prior to admission: None            Current DME:              Type of Home Care services:  PT, OT, Nursing Services    ADLS  Prior functional level: Independent in ADLs/IADLs, Assistance with the following:, Other (see comment) (driving)  Current functional level: Independent in ADLs/IADLs, Assistance with the following:, Housework, Shopping, Mobility    PT AM-PAC: 15 /24  OT AM-PAC: 12 /24    Family can provide assistance at DC:  Yes  Would you like Case Management to discuss the discharge plan with any other family members/significant others, and if so, who? Yes (sister Adrián Hernandez and niece Saravanan Freitas)  Plans to Return to Present Housing: Yes  Other Identified Issues/Barriers to RETURNING to current housing: PT/OT recommend SNF based on her functional scores. Potential Assistance needed at discharge: [address] DME:  none  Patient expects to discharge to: [address] [facility] [place] for transportation at discharge:  family to transport    Financial    Payor: [id number] / Plan: [address] / Product Type: *No Product type* /     Does insurance require precert for SNF: Yes    Potential assistance Purchasing Medications: No  Meds-to-Beds request: Yes      [address], [address] [facility] [phone] Atif Randolph [phone]  [address] [facility]  [address] [address]  Phone: [phone] Fax: [phone]      Notes:    Factors facilitating achievement of predicted outcomes: Family support, Cooperative, and Pleasant    Barriers to discharge: Impaired vision    Additional Case Management Notes: Patient lives alone. Declines the recommendation of SNF referral at this time but is amenable to home health care services. Vision is a barrier to driving and care. She states her sister Adrián Hernandez and her nephew help with driving. Call her niece Saravanan Freitas ([phone] because her sister is [place] per the EMR notes. Patient given a list of Home Care agencies to consider for home care. She will let us know of her decision. Patient given a [employer] brochure and a list of PCP providers. She states she will follow up with them for potential services. The Plan for Transition of Care is related to the following treatment goals of Pleural effusion [J90]  Hypoxia [R09.02]  Atrial fibrillation with RVR (HCC) [I48.91]  Acute respiratory failure with hypoxia ([address]) [N86.15]    IF APPLICABLE: The Patient and/or patient representative Stacey Oneil and her family were provided with a choice of provider and agrees with the discharge plan.  Freedom of choice list

## 2023-07-26 NOTE — ED NOTES
Left message for patients nurse on [facility] to review patients chart for transfer of care.          Marissa Foley RN  07/25/23 2004

## 2023-07-26 NOTE — CONSULTS
[facility]   Electrophysiology     Date: 7/26/2023  Date of admission: 7/25/2023  2:22 PM  Reason for Admission: Pleural effusion [J90]  Hypoxia [R09.02]  Atrial fibrillation with RVR ([address]) [I48.91]  Acute respiratory failure with hypoxia ([address]) [J96.01]    Consult Requesting Physician: Jermaine Lux MD    -Reason for Consultation: Atrial Fibrillation with RVR    Chief Complaint   Patient presents with    Shortness of Breath     SOB for approx 3 week & swelling to right leg for approx 3 days. Denies cardiac/pulm hx. Noted afib RVR in triage. HISTORY OF PRESENT ILLNESS: History obtained from patient and medical record. Radha Blanco is a 69 y.o. female with a history of tobacco dependence. She does not follow with a primary care physician. She denies history of HTN, CAD, COPD, LEILANI, DM, MI or palpitations. She does not drink alcohol. She reports she started to notice shortness of breath a month ago when the air quality was poor due to the wildifires. She did not notice any fast heart rate or palpitations at that time. She has smoked for over 40 years and quit 2 weeks ago. She developed bilateral lower extremity edema which was worsening over the last month. She presented to the ED last night with increasing shortness of breath at rest and feeling her \"heart racing'. Today she is still feeling palpitations, shortness of breath with exertion, denies chest pain or dizziness. She is on Diltiazem gtt with heart rates 100-150. She is on 5L oxygen with O2 sats 100%. Denies having chest pain, cough, or dizziness at the time of this visit. Assessment and Plan:     New Onset Atrial Fibrillation  - Duration unknown, has had symptoms since June.    - likely secondary to hypoxia  - no previous documentation of Atrial Fibrillation  - continue Diltiazem gtt titrate to keep HR<100  - contributing factor likely pleural and pericardial effusion  - start Metoprolol 50 mg BID  - Start Oral cardizem reports that she quit smoking about 3 weeks ago. Her smoking use included cigarettes. She has never used smokeless tobacco. She reports that she does not currently use alcohol. She reports that she does not currently use drugs. Family History:  Reviewed. family history is not on file. Denies family history of sudden cardiac death, arrhythmia, premature CAD    Review of System:  Constitutional: Negative for fever, night sweats, chills, weight changes, or weakness  Skin: Negative for rash, dry skin, pruritus, bruising, bleeding, blood clots, or changes in skin pigment  HEENT: Negative for vision changes, ringing in the ears, sore throat, dysphagia, or swollen lymph nodes  Respiratory: Reviewed in HPI  Cardiovascular: Reviewed in HPI  Gastrointestinal: Negative for abdominal pain, N/V/D, constipation, or black/tarry stools  Genito-Urinary: Negative for dysuria, incontinence, urgency, or hematuria  Musculoskeletal: Negative for joint swelling, muscle pain, or injuries  Neurological/Psych: Negative for confusion, seizures, headaches, balance issues or TIA-like symptoms. No anxiety, depression, or insomnia    Physical Examination:  Vitals:    07/26/23 1153   BP:    Pulse: (!) 122   Resp: 20   Temp:    SpO2: 95%      In: 240 [P.O.:240]  Out: 350    Wt Readings from Last 3 Encounters:   07/26/23 131 lb 13.4 oz (59.8 kg)       Telemetry: Personally Reviewed  - Afib rates 102-150    Constitutional: Oriented. No distress. Cardiovascular: Tachycardic  rate, Irregular rhythm, S1&S2. Pulmonary/Chest: reduced breath sounds on the right side   Abdominal: Soft. No tenderness   Musculoskeletal: ++ edema    Neurological: Alert and oriented.  Follows command    Pertinent labs, diagnostic, device, and imaging results reviewed as a part of this visit    Labs:  BMP:   Recent Labs     07/25/23  1445 07/26/23  0707    142   K 4.2 3.9    108   CO2 22 22   PHOS  --  4.1   BUN 22* 20   CREATININE 0.6 0.6   MG 1.80 1.70*

## 2023-07-26 NOTE — PLAN OF CARE
Problem: Discharge Planning  Goal: Discharge to home or other facility with appropriate resources  Outcome: Progressing     Problem: Safety - Adult  Goal: Free from fall injury  Outcome: Progressing  Note: Fall precautions in place, bed alarm on, nonskid foot wear applied, bed in lowest position, and call light within reach. Will continue to monitor. Problem: Cardiovascular - Adult  Goal: Maintains optimal cardiac output and hemodynamic stability  7/26/2023 0228 by Ronna Lucero RN  Outcome: Progressing  7/26/2023 0228 by Ronna Luceor RN  Outcome: Progressing  Goal: Absence of cardiac dysrhythmias or at baseline  7/26/2023 0229 by Ronna Lucero RN  Flowsheets (Taken 7/26/2023 0229)  Absence of cardiac dysrhythmias or at baseline:   Monitor cardiac rate and rhythm   Assess for signs of decreased cardiac output   Administer antiarrhythmia medication and electrolyte replacement as ordered  Note: Cardizem gtt infusing. NPO at midnight, cardiology consult in am.  IV Lasix. Intake and output recording. Daily weight. Telemetry monitoring. 7/26/2023 0228 by Ronna Lucero RN  Outcome: Progressing  7/26/2023 0228 by Ronna Lucero RN  Outcome: Progressing     Problem: Musculoskeletal - Adult  Goal: Return mobility to safest level of function  Outcome: Progressing  Flowsheets (Taken 7/26/2023 0228)  Return Mobility to Safest Level of Function:   Assist with transfers and ambulation using safe patient handling equipment as needed   Obtain physical therapy/occupational therapy consults as needed  Note: PT/OT ordered.      Problem: Respiratory - Adult  Goal: Achieves optimal ventilation and oxygenation  Outcome: Progressing  Flowsheets (Taken 7/26/2023 0229)  Achieves optimal ventilation and oxygenation:   Assess for changes in respiratory status   Assess for changes in mentation and behavior   Position to facilitate oxygenation and minimize respiratory effort   Oxygen supplementation based on oxygen saturation or arterial blood gases Respiratory therapy support as indicated   Encourage broncho-pulmonary hygiene including cough, deep breathe, incentive spirometry  Note: Pt quit smoking 4 weeks ago. Lungs sound wheezes throughout, crackles noted RLL. IV Abx given per MAR. Nares swabbed, result pending. Supplement O2 4L at this time to keep SaO2 >90%. Breathing treatments. Diuresing. Will continue to monitor.

## 2023-07-26 NOTE — PROGRESS NOTES
Nutrition Note    RECOMMENDATIONS  PO Diet: Resume diet as tolerated  ONS: Alonso Ensure TID once diet advances     NUTRITION ASSESSMENT   Pt triggered for nutrition intervention d/t MST score of 3 for poor appetite & wt loss. States poor appetite x past month, & \"forces self to eat\". Pt states UBW is 135 lb; CBW per bedscale is 131 lb, however pt is retaining fluid. Noted +2-+3 pitting BLE edema. Pt does exhibit signs of muscle & fat tissue wasting per NFPA. Pt is NPO this am, pending cardiology consult. Agrees to receive Ensure TID once diet advances. Will monitor for improved po & supplement intake. Nutrition Related Findings: +2-+3 pitting edema BLE; +BS; Mg 1.7  Wounds: None  Nutrition Education:  Education not indicated   Nutrition Goals: PO intake 50% or greater, Initiate PO diet     MALNUTRITION ASSESSMENT   Chronic Illness  Malnutrition Status: At risk for malnutrition (Comment)    NUTRITION DIAGNOSIS   Inadequate oral intake related to inadequate protein-energy intake as evidenced by poor intake prior to admission, mild loss of subcutaneous fat, mild muscle loss, NPO or clear liquid status due to medical condition (forces self to eat d/t decreased appetite)      CURRENT NUTRITION THERAPIES  Diet NPO Exceptions are: Sips of Water with Meds     PO Intake: NPO   PO Supplement Intake:None Ordered    ANTHROPOMETRICS  Current Height: 5' 3\" (160 cm)  Current Weight - Scale: 131 lb 13.4 oz (59.8 kg)    Ideal Body Weight (IBW): 115 lbs  (52 kg)    Usual Bodyweight 135 lb (61.2 kg)       BMI: 23.2      COMPARATIVE STANDARDS  Total Energy Requirements (kcals/day): 1500- 1800     Protein (g):  72- 120g       Fluid (mL/day):  1 ml/kcal    The patient will be monitored per nutrition standards of care. Consult dietitian if additional nutrition interventions are needed prior to RD reassessment.      Tarik Tolentino RD, LD    Contact: [phone]

## 2023-07-26 NOTE — PROGRESS NOTES
The Echo Department has acknowledged order for patient. Patients heart rate is too high and might yield an inaccurate exam. Cardiology notified, was asked to wait until cardiology has seen patient to do exam.   Echo will be completed once heart rate is lower or cardiology deems echo is necessary regardless of heart rate.

## 2023-07-26 NOTE — PROGRESS NOTES
[address] [facility] Department   Phone: [phone]    Occupational Therapy    [x] Initial Evaluation            [] Daily Treatment Note         [] Discharge Summary      Patient: Sarika Elizondo   : 1945   MRN: [id number]   Date of Service:  2023    Admitting Diagnosis:  Acute respiratory failure with hypoxia [facility])  Current Admission Summary: 69 y.o. female with no significant past no history who presented to [facility] with complaints of shortness of breath. Patient states for the last 2-3 weeks she has been having progressively worsening shortness of breath. She has never had anything like this happen to her before. She states she noticed her breathing trouble started when the [place] were at their peak in late . Additionally, patient states used to smoke 1/2 pack/day, but at that time when she started to notice her shortness of breath, she decided to quit smoking as well. Additionally, she states that she has had bilateral leg swelling, which is new for her as well. No history of blood clots. No history of heart failure or MIs. Additionally, patient was found to be in atrial fibrillation with RVR in the ED. Patient has never had atrial fibrillation in the past.  Not on any blood thinners or antiarrhythmics. Patient denies chest pain, nausea, vomiting, GI/ symptoms, edema, fever, or chills. Former smoker. Quit 4 weeks ago. Used to smoke 1 pack/day for 50+ years. \"   - LE dropplers negative for DVT    Past Medical History:  has no past medical history on file. Past Surgical History:  has no past surgical history on file. Discharge Recommendations: Sarika Elizondo scored a  on the AM-PAC ADL Inpatient form. Current research shows that an AM-PAC score of 17 or less is typically not associated with a discharge to the patient's home setting.  Based on the patient's AM-PAC score and their current ADL deficits, it is recommended

## 2023-07-26 NOTE — PROGRESS NOTES
V2.0    [facility] Progress Note      Name:  Eliana Bolton /Age/Sex: 1945  (69 y.o. female)   MRN & CSN:  [id number] & [id number] Encounter Date/Time: 2023 4:13 PM EDT   Location:  [place] [address]01 PCP: No primary care provider on file. Attending:Rashaad Rhodes MD       Hospital Day: 2    Assessment and Recommendations   Eliana Bolton is a 69 y.o. female with pmh of  who presents with Acute respiratory failure with hypoxia ([address])      Plan:   Acute hypoxic respiratory failure  -Suspect probably secondary to CHF  -Negative for PE  -Noted to have a large fluid effusion echocardiogram is we will continue IV diuresis for now  -Monitor closely  -On empiric antibiotics given patient meeting SIRS criteria    A-fib RVR  -Secondary to ongoing issue. On Cardizem drip will be able to wean it down  -EP cardiology consulted    Pleural effusion with pericardial effusion  -Need to evaluate for improvement monitor with IV diuresis improvement repeat chest x-ray in the morning      Diet ADULT DIET; Dysphagia - Soft and Bite Sized; Low Sodium (2 gm)   DVT Prophylaxis [] Lovenox, []  Heparin, [] SCDs, [] Ambulation,  [] Eliquis, [] Xarelto  [] Coumadin   Code Status Full Code   Disposition   Patient requires continued admission due to    Surrogate Decision Maker/ POA       Personally reviewed Lab Studies and Imaging         Subjective:     Chief Complaint:     Eliana Bolton is a 69 y.o. female who presents with patient resting comfortably. Just came back from echo. Denies any chest pain      Review of Systems:      Pertinent positives and negatives discussed in HPI    Objective:      Intake/Output Summary (Last 24 hours) at 2023 1613  Last data filed at 2023 0925  Gross per 24 hour   Intake 240 ml   Output 350 ml   Net -110 ml      Vitals:   Vitals:    23 1430 23 1445 23 1500 23 1510   BP:       Pulse: (!) 109 91 99    Resp:    20   Temp:       TempSrc:       SpO2:       Weight: ! +------------------------+-----------+------+------------+ ! Common Femoral          !Pulsatile  ! No    !            ! +------------------------+-----------+------+------------+ ! Prox Femoral            !Spontaneous! No    !            ! +------------------------+-----------+------+------------+ ! Deep Femoral            !Spontaneous! No    !            ! +------------------------+-----------+------+------------+ ! Popliteal               !Spontaneous! No    !            ! +------------------------+-----------+------+------------+      CBC:   Recent Labs     07/25/23 1445 07/26/23  0707   WBC 9.3 9.5   HGB 16.4* 14.8    212     BMP:    Recent Labs     07/25/23 1445 07/26/23  0707    142   K 4.2 3.9    108   CO2 22 22   BUN 22* 20   CREATININE 0.6 0.6   GLUCOSE 129* 107*     Hepatic:   Recent Labs     07/25/23 1445   AST 20   ALT 15   BILITOT 1.0   ALKPHOS 153*     Lipids: No results found for: CHOL, HDL, TRIG  Hemoglobin A1C: No results found for: LABA1C  TSH: No results found for: TSH  Troponin: No results found for: TROPONINT  Lactic Acid: No results for input(s): LACTA in the last 72 hours.   BNP:   Recent Labs     07/25/23  1445   PROBNP 1,160*     UA:  Lab Results   Component Value Date/Time    NITRU Negative 07/25/2023 11:10 PM    COLORU Yellow 07/25/2023 11:10 PM    PHUR 5.5 07/25/2023 11:10 PM    WBCUA 1 07/25/2023 11:10 PM    RBCUA 18 07/25/2023 11:10 PM    BACTERIA Rare 07/25/2023 11:10 PM    CLARITYU Clear 07/25/2023 11:10 PM    SPECGRAV >=1.030 07/25/2023 11:10 PM    LEUKOCYTESUR Negative 07/25/2023 11:10 PM    UROBILINOGEN 1.0 07/25/2023 11:10 PM    BILIRUBINUR Negative 07/25/2023 11:10 PM    BLOODU SMALL 07/25/2023 11:10 PM    GLUCOSEU Negative 07/25/2023 11:10 PM    KETUA Negative 07/25/2023 11:10 PM     Urine Cultures: No results found for: LABURIN  Blood Cultures: No results found for: BC  No results found for: BLOODCULT2  Organism: No results found for: [facility]      Electronically

## 2023-07-26 NOTE — PROGRESS NOTES
RN [facility] hospitalist Aundrea Cox NP: No need to collect arterial blood gas at this time since pt is alert and oriented, not lethargic or drowsy. Will continue to monitor.

## 2023-07-26 NOTE — CARE COORDINATION
Discharge Planning Note Re: [address]      CM/SW noted consult for discharge planning. Chart reviewed. Noted recommendations for SNF but patient chooses home health care if possible. CM met with patient. Introduced self and explained role of CM and discharge planning. Patient is agreeable to home health on dc. [place] of choice list was provided with basic dialogue that supports the patient's individualized plan of care/goals, treatment preferences and shares the quality data associated with the providers. [x] Yes [] No.  Patient has reviewed the provided list and made selections. Referral made to [facility]. Pending [facility] order for  [x]RN [x]PT  [x]OT  []HHA  []BREANNA  []  [place]   [address] [facility] [address]  [facility], [address] [place], [place] [address]  Phone: [phone]  Fax: [phone]      Zeynepjeronimo Razo [phone] [facility] liaison and he confirmed the ability to provide services . CM/SW will follow-up on referrals and provide any additional documentation necessary to facilitate placement.      Electronically signed by Allen Leal RN on 7/26/2023 at 4:10 PM

## 2023-07-26 NOTE — PROGRESS NOTES
patient/caregiver education, home exercise program, safety education, and equipment evaluation/education    Goals  Patient Goals: None stated   Short Term Goals:  Time Frame: Before discharge  Patient will complete bed mobility at supervision   Patient will complete transfers at contact guard assistance   Patient will ambulate 50 ft with use of no device at contact guard assistance  Patient will ascend/descend 1 stairs without use of HR at contact guard assistance  Patient to maintain standing at contact guard assistance, with unilateral UE support for 5 minutes in order to complete ADLs     Above goals reviewed on 7/26/2023. All goals are ongoing at this time unless indicated above.     Therapy Session Time      Individual Group Co-treatment   Time In     6008   Time Out     1020   Minutes     31     Timed Code Treatment Minutes:  16 Minutes  Total Treatment Minutes:  31 minutes       Electronically Signed By: BOAZ Shipman Patient, PT, DPT #[id number]

## 2023-07-26 NOTE — PROGRESS NOTES
Pt admitted to room 5918. Pt is alert and oriented x4. Pt lives at home alone, ambulates independently. Sister lives close by. Pt cannot drive, will need transportation set up for doctor appointments. Admission documentation and assessments completed. VSS, Cardizem gtt infusing, IV Abx infusing. IV Lasix given. Telemetry monitoring. Breathing treatment ordered. Labs collected. Plan of care discussed with pt, all questions answered at this time. Pt denies pain. Snack and drink provided. Fall precautions in place, bed alarm on, nonskid foot wear applied, bed in lowest position, and call light within reach. Will continue to monitor.

## 2023-07-27 ENCOUNTER — APPOINTMENT (OUTPATIENT)
Dept: GENERAL RADIOLOGY | Age: 78
DRG: 291 | End: 2023-07-27
Payer: MEDICARE

## 2023-07-27 ENCOUNTER — APPOINTMENT (OUTPATIENT)
Dept: INTERVENTIONAL RADIOLOGY/VASCULAR | Age: 78
DRG: 291 | End: 2023-07-27
Payer: MEDICARE

## 2023-07-27 LAB
ANION GAP SERPL CALCULATED.3IONS-SCNC: 12 MMOL/L (ref 3–16)
APPEARANCE FLUID: CLEAR
BASE EXCESS BLDA CALC-SCNC: -2.5 MMOL/L (ref -3–3)
BASOPHILS # BLD: 0 K/UL (ref 0–0.2)
BASOPHILS NFR BLD: 0.3 %
BDY FLUID QUALITY: NORMAL
BUN SERPL-MCNC: 23 MG/DL (ref 7–20)
CALCIUM SERPL-MCNC: 8.8 MG/DL (ref 8.3–10.6)
CELL COUNT FLUID TYPE: NORMAL
CHLORIDE SERPL-SCNC: 107 MMOL/L (ref 99–110)
CO2 BLDA-SCNC: 53 MMOL/L
CO2 SERPL-SCNC: 21 MMOL/L (ref 21–32)
COHGB MFR BLDA: 1.6 % (ref 0–1.5)
COLOR FLUID: NORMAL
CREAT SERPL-MCNC: 1.1 MG/DL (ref 0.6–1.2)
DEPRECATED RDW RBC AUTO: 15.7 % (ref 12.4–15.4)
EOSINOPHIL # BLD: 0 K/UL (ref 0–0.6)
EOSINOPHIL NFR BLD: 0.1 %
EST. AVERAGE GLUCOSE BLD GHB EST-MCNC: 108.3 MG/DL
GFR SERPLBLD CREATININE-BSD FMLA CKD-EPI: 52 ML/MIN/{1.73_M2}
GLUCOSE FLD-MCNC: 141 MG/DL
GLUCOSE SERPL-MCNC: 132 MG/DL (ref 70–99)
HBA1C MFR BLD: 5.4 %
HCO3 BLDA-SCNC: 22.4 MMOL/L (ref 21–29)
HCT VFR BLD AUTO: 48.2 % (ref 36–48)
HGB BLD-MCNC: 15.4 G/DL (ref 12–16)
HGB BLDA-MCNC: 16.1 G/DL (ref 12–16)
LDH FLD L TO P-CCNC: 103 U/L
LEGIONELLA AG UR QL: NORMAL
LYMPHOCYTES # BLD: 1.1 K/UL (ref 1–5.1)
LYMPHOCYTES NFR BLD: 8.8 %
LYMPHOCYTES NFR FLD: 26 %
MACROPHAGES # FLD: 54 %
MCH RBC QN AUTO: 31.3 PG (ref 26–34)
MCHC RBC AUTO-ENTMCNC: 31.9 G/DL (ref 31–36)
MCV RBC AUTO: 98.1 FL (ref 80–100)
METHGB MFR BLDA: 0.2 %
MONOCYTES # BLD: 0.8 K/UL (ref 0–1.3)
MONOCYTES NFR BLD: 6.7 %
MONONUCLEAR UNIDENTIFIED CELLS FLUID: 1 %
MRSA DNA SPEC QL NAA+PROBE: NORMAL
NEUTROPHIL, FLUID: 19 %
NEUTROPHILS # BLD: 10.3 K/UL (ref 1.7–7.7)
NEUTROPHILS NFR BLD: 84.1 %
NUC CELL # FLD: 487 /CUMM
O2 THERAPY: ABNORMAL
PATH REV: YES
PCO2 BLDA: 38.6 MMHG (ref 35–45)
PH BLDA: 7.37 [PH] (ref 7.35–7.45)
PLATELET # BLD AUTO: 233 K/UL (ref 135–450)
PMV BLD AUTO: 8 FL (ref 5–10.5)
PO2 BLDA: 83.2 MMHG (ref 75–108)
POTASSIUM SERPL-SCNC: 4.6 MMOL/L (ref 3.5–5.1)
PROT FLD-MCNC: 2 G/DL
RBC # BLD AUTO: 4.91 M/UL (ref 4–5.2)
RBC FLUID: <1000 /CUMM
S PNEUM AG UR QL: NORMAL
SAO2 % BLDA: 96.9 %
SODIUM SERPL-SCNC: 140 MMOL/L (ref 136–145)
SPECIMEN SOURCE FLD: NORMAL
TOTAL CELLS COUNTED FLD: 100
WBC # BLD AUTO: 12.2 K/UL (ref 4–11)

## 2023-07-27 PROCEDURE — 6370000000 HC RX 637 (ALT 250 FOR IP): Performed by: INTERNAL MEDICINE

## 2023-07-27 PROCEDURE — 94660 CPAP INITIATION&MGMT: CPT

## 2023-07-27 PROCEDURE — 6370000000 HC RX 637 (ALT 250 FOR IP): Performed by: NURSE PRACTITIONER

## 2023-07-27 PROCEDURE — 87205 SMEAR GRAM STAIN: CPT

## 2023-07-27 PROCEDURE — 6360000002 HC RX W HCPCS: Performed by: INTERNAL MEDICINE

## 2023-07-27 PROCEDURE — 2700000000 HC OXYGEN THERAPY PER DAY

## 2023-07-27 PROCEDURE — 32555 ASPIRATE PLEURA W/ IMAGING: CPT

## 2023-07-27 PROCEDURE — 80048 BASIC METABOLIC PNL TOTAL CA: CPT

## 2023-07-27 PROCEDURE — 99233 SBSQ HOSP IP/OBS HIGH 50: CPT | Performed by: INTERNAL MEDICINE

## 2023-07-27 PROCEDURE — 82945 GLUCOSE OTHER FLUID: CPT

## 2023-07-27 PROCEDURE — 6360000002 HC RX W HCPCS: Performed by: HOSPITALIST

## 2023-07-27 PROCEDURE — 6360000002 HC RX W HCPCS: Performed by: NURSE PRACTITIONER

## 2023-07-27 PROCEDURE — 83615 LACTATE (LD) (LDH) ENZYME: CPT

## 2023-07-27 PROCEDURE — 94761 N-INVAS EAR/PLS OXIMETRY MLT: CPT

## 2023-07-27 PROCEDURE — 71045 X-RAY EXAM CHEST 1 VIEW: CPT

## 2023-07-27 PROCEDURE — 85025 COMPLETE CBC W/AUTO DIFF WBC: CPT

## 2023-07-27 PROCEDURE — 88112 CYTOPATH CELL ENHANCE TECH: CPT

## 2023-07-27 PROCEDURE — 6360000002 HC RX W HCPCS: Performed by: STUDENT IN AN ORGANIZED HEALTH CARE EDUCATION/TRAINING PROGRAM

## 2023-07-27 PROCEDURE — 94640 AIRWAY INHALATION TREATMENT: CPT

## 2023-07-27 PROCEDURE — 82803 BLOOD GASES ANY COMBINATION: CPT

## 2023-07-27 PROCEDURE — 88305 TISSUE EXAM BY PATHOLOGIST: CPT

## 2023-07-27 PROCEDURE — C1729 CATH, DRAINAGE: HCPCS

## 2023-07-27 PROCEDURE — 84157 ASSAY OF PROTEIN OTHER: CPT

## 2023-07-27 PROCEDURE — 0W993ZZ DRAINAGE OF RIGHT PLEURAL CAVITY, PERCUTANEOUS APPROACH: ICD-10-PCS | Performed by: PSYCHIATRY & NEUROLOGY

## 2023-07-27 PROCEDURE — 2000000000 HC ICU R&B

## 2023-07-27 PROCEDURE — 6370000000 HC RX 637 (ALT 250 FOR IP): Performed by: STUDENT IN AN ORGANIZED HEALTH CARE EDUCATION/TRAINING PROGRAM

## 2023-07-27 PROCEDURE — 2500000003 HC RX 250 WO HCPCS: Performed by: HOSPITALIST

## 2023-07-27 PROCEDURE — 87015 SPECIMEN INFECT AGNT CONCNTJ: CPT

## 2023-07-27 PROCEDURE — 87070 CULTURE OTHR SPECIMN AEROBIC: CPT

## 2023-07-27 PROCEDURE — 2580000003 HC RX 258: Performed by: STUDENT IN AN ORGANIZED HEALTH CARE EDUCATION/TRAINING PROGRAM

## 2023-07-27 PROCEDURE — 2500000003 HC RX 250 WO HCPCS: Performed by: STUDENT IN AN ORGANIZED HEALTH CARE EDUCATION/TRAINING PROGRAM

## 2023-07-27 RX ORDER — FUROSEMIDE 10 MG/ML
40 INJECTION INTRAMUSCULAR; INTRAVENOUS 2 TIMES DAILY
Status: DISCONTINUED | OUTPATIENT
Start: 2023-07-27 | End: 2023-08-04 | Stop reason: HOSPADM

## 2023-07-27 RX ORDER — LIDOCAINE HYDROCHLORIDE 10 MG/ML
5 INJECTION, SOLUTION EPIDURAL; INFILTRATION; INTRACAUDAL; PERINEURAL ONCE
Status: COMPLETED | OUTPATIENT
Start: 2023-07-27 | End: 2023-07-27

## 2023-07-27 RX ORDER — DIGOXIN 0.25 MG/ML
125 INJECTION INTRAMUSCULAR; INTRAVENOUS ONCE
Status: COMPLETED | OUTPATIENT
Start: 2023-07-27 | End: 2023-07-27

## 2023-07-27 RX ADMIN — DILTIAZEM HYDROCHLORIDE 60 MG: 60 CAPSULE, EXTENDED RELEASE ORAL at 09:48

## 2023-07-27 RX ADMIN — FUROSEMIDE 40 MG: 10 INJECTION, SOLUTION INTRAMUSCULAR; INTRAVENOUS at 18:20

## 2023-07-27 RX ADMIN — CEFTRIAXONE SODIUM 1000 MG: 1 INJECTION, POWDER, FOR SOLUTION INTRAMUSCULAR; INTRAVENOUS at 21:13

## 2023-07-27 RX ADMIN — LIDOCAINE HYDROCHLORIDE 5 ML: 10 INJECTION, SOLUTION EPIDURAL; INFILTRATION; INTRACAUDAL; PERINEURAL at 08:59

## 2023-07-27 RX ADMIN — IPRATROPIUM BROMIDE AND ALBUTEROL SULFATE 1 DOSE: .5; 3 SOLUTION RESPIRATORY (INHALATION) at 07:34

## 2023-07-27 RX ADMIN — IPRATROPIUM BROMIDE AND ALBUTEROL SULFATE 1 DOSE: .5; 3 SOLUTION RESPIRATORY (INHALATION) at 11:17

## 2023-07-27 RX ADMIN — METOPROLOL TARTRATE 50 MG: 50 TABLET ORAL at 21:08

## 2023-07-27 RX ADMIN — IPRATROPIUM BROMIDE AND ALBUTEROL SULFATE 1 DOSE: .5; 3 SOLUTION RESPIRATORY (INHALATION) at 20:38

## 2023-07-27 RX ADMIN — Medication 2.5 MG/HR: at 12:15

## 2023-07-27 RX ADMIN — IPRATROPIUM BROMIDE AND ALBUTEROL SULFATE 1 DOSE: .5; 3 SOLUTION RESPIRATORY (INHALATION) at 15:47

## 2023-07-27 RX ADMIN — IPRATROPIUM BROMIDE AND ALBUTEROL SULFATE 1 DOSE: .5; 3 SOLUTION RESPIRATORY (INHALATION) at 02:31

## 2023-07-27 RX ADMIN — ENOXAPARIN SODIUM 60 MG: 100 INJECTION SUBCUTANEOUS at 21:06

## 2023-07-27 RX ADMIN — FUROSEMIDE 5 MG/HR: 10 INJECTION, SOLUTION INTRAMUSCULAR; INTRAVENOUS at 00:06

## 2023-07-27 RX ADMIN — DIGOXIN 125 MCG: 0.25 INJECTION INTRAMUSCULAR; INTRAVENOUS at 18:56

## 2023-07-27 RX ADMIN — DILTIAZEM HYDROCHLORIDE 60 MG: 60 CAPSULE, EXTENDED RELEASE ORAL at 21:08

## 2023-07-27 RX ADMIN — METOPROLOL TARTRATE 50 MG: 50 TABLET ORAL at 09:48

## 2023-07-27 RX ADMIN — Medication 10 ML: at 09:48

## 2023-07-27 RX ADMIN — ENOXAPARIN SODIUM 60 MG: 100 INJECTION SUBCUTANEOUS at 09:48

## 2023-07-27 ASSESSMENT — PAIN SCALES - GENERAL: PAINLEVEL_OUTOF10: 0

## 2023-07-27 NOTE — BRIEF OP NOTE
Brief Postoperative Note    Radha Blanco  YOB: 1945  [id number]    Pre-operative Diagnosis:  Right Pleural effusion    Post-operative Diagnosis: Same    Procedure: US Guided Thoracentesis    Anesthesia: Local    Surgeons: Tracy Rivera MD    Estimated Blood Loss: < 5 mL    Complications: None    Specimens: serous pleural fluid    Findings: Technically successful right thoracentesis    Electronically signed by Ramonita Amaral MD on 7/27/2023 at 8:46 AM

## 2023-07-27 NOTE — SIGNIFICANT EVENT
SIGNIFICANT EVENT:  RAPID RESPONSE    Name:  Rubens James    /Age/Sex: 1945  (69 y.o. female)  MRN & CSN:  [id number] & [id number]    SUBJECTIVE:     RAPID RESPONSE was called for Rubens James in regards to shortness of breath. Patient is a 69 y.o. female admitted at [facility] for Acute respiratory failure with hypoxia ([address] Patient seen and examined in [id number]. Short of breath. On non-rebreather. Fatigued. Barely able to answer questions. Just nods or shakes head. OBJECTIVE:     VITALS  Vitals:    23 2215 23 2231 23 2236 23 2303   BP: (!) 111/90   97/64   Pulse: 95 90 84 79   Resp:  24     Temp:       TempSrc:       SpO2: (!) 87% (!) 89% (!) 89% (!) 87%   Weight:       Height:                     PHYSICAL EXAM   GEN Awake female, in mild distress. EYES PERRLA. No scleral erythema or discharge. HENT Mucous membranes are moist. Pharynx without exudates or thrush. On non-rebreather   NECK Supple, no apparent thyromegaly or masses. RESP Moderate bilateral crackles. Symmetric chest movement. CARDIO/VASC S1/S2. RRR. No JVD. Pulses equal & b/l. 3+ pitting edema bilateral LEs. GI Soft. No TTP in all quadrants. BS +. Rectal exam deferred.  No costovertebral tenderness. HEME/LYMPH No bruising, lymphadenopathy, or hepatosplenomagly. MSK No gross joint deformities. SKIN Normal coloration, warm, & dry. NEURO CNs grossly intact, normal speech, no lateralizing weakness. PSYCH Awake, alert, oriented x 4. Affect appropriate. ASSESSMENT/PLAN:    Patient does not seem septic at this time.     1. Dyspnea  -ABG pH 7.098, CO2 57.7, HCO3 17.8  -Lasix drip started  -Levo ordered, but not started, can start as needed; keep MAP >65  -Turned off cardizem drip; keep HR <110; restart if needed  -Transfer to ICU  -BiPAP started  -3A Bicarb ordered      Patient has a high probability of imminent or life-threatening deterioration requiring

## 2023-07-27 NOTE — PROGRESS NOTES
Azithromycin held, during administration started to feel itching and tingling, mouth and throat.  Benadryl 25 mg IV and Pepcid 20 mg IV x 1 dose

## 2023-07-27 NOTE — PROGRESS NOTES
Physician Progress Note      Alyssia [facility]  CSN #:                  [id number]  :                       1945  ADMIT DATE:       2023 2:22 PM  [address] [facility] DATE:  RESPONDING  PROVIDER #:        Leo Cruz MD          QUERY TEXT:    Pt admitted with Acute hypoxic respiratory failure and has CHF documented. If   possible, please document in progress notes and/or discharge summary further   specificity regarding the type and acuity of CHF as being: The medical record reflects the following:  Risk Factors: No documented Hx. of CHF  Clinical Indicators: Pro BNP 1,160. .. per CXR: Moderate right pleural   effusion. Per Echo: EF 45%. Per progress note on : Acute hypoxic   respiratory failure -Suspect probably secondary to CHF. Per progress note on   : Acute CHF exacerbation. Treatment: cardiology consult, Echo, Lasix drip  Options provided:  -- Acute on Chronic Systolic CHF/HFrEF  -- Acute on Chronic Diastolic CHF/HFpEF  -- Acute on Chronic Systolic and Diastolic CHF  -- Acute Systolic CHF/HFrEF  -- Acute Diastolic CHF/HFpEF  -- Acute Systolic and Diastolic CHF  -- Other - I will add my own diagnosis  -- Disagree - Not applicable / Not valid  -- Disagree - Clinically unable to determine / Unknown  -- Refer to Clinical Documentation Reviewer    PROVIDER RESPONSE TEXT:    This patient is in acute on chronic systolic CHF/HFrEF.     Query created by: Sangita Carlson on 2023 3:37 PM      Electronically signed by:  Leo Cruz MD 2023 6:02 PM

## 2023-07-27 NOTE — PROGRESS NOTES
66 Pt c/o increased SOB with labored breathing. O2 sats 85-89%, O2 increased to 5L NC. RT notified and PRN breathing tx given. IV Zithromax was infusing, rate decreased. Pt stated c/o mouth and throat tingling and burning. NP notified, IV Benadryl ordered and given, IV ABX stopped. Continued worsening SOB, placed on nonrebreather. Cardizem gtt was running at 5 mg/hr, BP dropped to 82/49, Cardizem gtt turned off. Difficulty obtaining pulse ox. Rapid response called at 2313, MD to bedside at 2315. , ABG completed. Order to transfer to CVU for Bipap, Lasix gtt, and pressors.  Pt transferred to CVU bed 09 on 10 L O2 at 2345, bedside report given to mica/ fabian.

## 2023-07-27 NOTE — PROGRESS NOTES
Attempted to notify pt sister Sivakumar Trujillo [phone] regarding pt status and transfer to CVU, called x2 with no answer and no voicemail set up.

## 2023-07-27 NOTE — PROGRESS NOTES
750ml of fluid removed  Spoke to patients BELLA Milan and advised her the amount of fluid removed and that patient was returning to the floor.

## 2023-07-27 NOTE — PROGRESS NOTES
V2.0    [facility] Progress Note      Name:  Ama Francis /Age/Sex: 1945  (69 y.o. female)   MRN & CSN:  [id number] & [id number] Encounter Date/Time: 2023 1:29 PM EDT   Location:  [address] PCP: No primary care provider on file. Angelica Rubi MD       Hospital Day: 3    Assessment and Recommendations   Ama Francis is a 69 y.o. female with pmh of  who presents with Acute respiratory failure with hypoxia ([address])      Plan:   -Acute hypoxic respiratory failure  Suspect this is multifactorial with underlying effusion along with possible CHF and also A-fib RVR  Patient will undergo ultrasound-guided thoracocentesis today. Wean oxygen as tolerated. We will follow-up on fluid studies. Echocardiogram noted EF slightly reduced but not significant. ABG yesterday noted for significant acidosis with CO2 of 57 which has bee      Acute CHF exacerbation  Patient has been started on Lasix drip will continue for now  Monitor output. Renal function closely    A-fib with RVR  -Rate is still not controlled. Patient is not a great candidate for amiodarone due to current ongoing respiratory issues  Continue to titrate medication on Cardizem. We will monitor closely    Severe COPD with pericardial effusion    Diet ADULT DIET; Regular; Low Fat/Low Chol/High Fiber/GARY  ADULT ORAL NUTRITION SUPPLEMENT; Breakfast, Lunch, Dinner; Standard High Calorie/High Protein Oral Supplement   DVT Prophylaxis [] Lovenox, []  Heparin, [] SCDs, [] Ambulation,  [] Eliquis, [] Xarelto  [] Coumadin   Code Status Full Code   Disposition   Patient requires continued admission due to    Surrogate Decision Maker/ POA       Personally reviewed Lab Studies and Imaging   Due to the high probability of clinically significant life threating deterioration of the patient's condition that required my urgent intervention, a total critical care time 55 minutes was used.  This time excludes any time that may have been spent 2.5 mg  1 Dose Inhalation Q4H      Infusions:    furosemide (LASIX) 1mg/mL infusion 5 mg/hr (07/27/23 0006)    [Held by provider] norepinephrine Stopped (07/27/23 0532)    dilTIAZem 2.5 mg/hr (07/27/23 1215)    sodium chloride       PRN Meds: perflutren lipid microspheres, 1.5 mL, ONCE PRN  sodium chloride flush, 5-40 mL, PRN  sodium chloride, , PRN  ondansetron, 4 mg, Q8H PRN   Or  ondansetron, 4 mg, Q6H PRN  polyethylene glycol, 17 g, Daily PRN  acetaminophen, 650 mg, Q6H PRN   Or  acetaminophen, 650 mg, Q6H PRN  magnesium sulfate, 2,000 mg, PRN  potassium chloride, 40 mEq, PRN   Or  potassium alternative oral replacement, 40 mEq, PRN   Or  potassium chloride, 10 mEq, PRN  albuterol, 2.5 mg, Q2H PRN        Labs and Imaging   XR CHEST PORTABLE    Result Date: 7/25/2023  EXAMINATION: ONE XRAY VIEW OF THE CHEST 7/25/2023 3:06 pm COMPARISON: None. HISTORY: ORDERING SYSTEM PROVIDED HISTORY: SOB TECHNOLOGIST PROVIDED HISTORY: Reason for exam:->SOB Reason for Exam: sob FINDINGS: HEART/MEDIASTINUM: The cardiomediastinal silhouette cannot be accurately assessed due to adjacent lung pathology. PLEURA/LUNGS: There is a moderate right pleural effusion. There are increased interstitial markings likely reflecting pulmonary vascular congestion superimposed on chronic interstitial changes. There is no appreciable pneumothorax. BONES/SOFT TISSUE: No acute abnormality. Moderate right pleural effusion. XR CHEST 1 VIEW    Result Date: 7/27/2023  EXAMINATION: ONE XRAY VIEW OF THE CHEST 7/27/2023 8:42 am COMPARISON: July 25, 2023 HISTORY: ORDERING SYSTEM PROVIDED HISTORY: s/p thora TECHNOLOGIST PROVIDED HISTORY: Reason for exam:->s/p thora Reason for Exam: S/p right thora FINDINGS: Cardiomediastinal silhouette is stable. Mild interstitial prominence similar to prior examination. Significant decrease in right pleural effusion, now small. Possible trace left pleural effusion. No pneumothorax.      Decreased right pleural

## 2023-07-27 NOTE — PROGRESS NOTES
significant   regurgitation.   -Thickened mitral valve without evidence of stenosis. There is severe mitral regurgitation.   -There is severe tricuspid regurgitation with a RVSP estimation of 56 mmHg. This is suggestive of moderate pulmonary hypertension.   -Mild pulmonic regurgitation present.   -Moderate circumferential pericardial effusion noted without evidence of right ventricular collapse. -IVC size is dilated (>2.1 cm) but collapses > 50% with respiration   consistent with elevated RA pressure (8 mmHg). -Diastolic dysfunction is present however indeterminate in the presence of  atrial fibrillation. Stress Test: none    Cath:none    CT Chest Pulmonary Embolism W Contrast: 7/25/2023      CXR 7/27/2023  FINDINGS:  Cardiomediastinal silhouette is stable. Mild interstitial prominence similar to prior examination. Significant decrease in right pleural effusion, now small. Possible trace left pleural effusion. No pneumothorax. IMPRESSION:  Decreased right pleural effusion, now small. No postprocedural pneumothorax. VL Extremity Venous Bilateral 7/26/2023  Summary:   -There is no evidence of deep or superficial venous thrombosis involving the bilateral lower extremities. -Pulsatile venous flow noted in the bilateral lower extremities     Problem List:   Patient Active Problem List    Diagnosis Date Noted    Acute pulmonary edema ([address]) 07/26/2023    Centrilobular emphysema ([address]) 07/26/2023    Atrial fibrillation with RVR ([address]) 07/25/2023    Acute respiratory failure with hypoxia ([address]) 07/25/2023    SIRS (systemic inflammatory response syndrome) ([address]) 07/25/2023    Pleural effusion, right 07/25/2023    Pericardial effusion 07/25/2023        Thank you for allowing to us to participate in the care of Kyle Vigil.     BANDAR Goncalves-CNP  [facility]   Office: [phone]

## 2023-07-27 NOTE — PROGRESS NOTES
Rapid Response Quick Summary    Room: [place] [address]-01    Assessment of concern / patient:  pt having increased respiratory distress    Physician involved:  Dr Amparo Tinoco    Interventions:  ABG completed. Pt to be placed on bipap and lasix gtt started.  May need vasopressor per MD    Disposition:  pt transferred to CVU bed 9

## 2023-07-27 NOTE — PROGRESS NOTES
Pt came from [address] [place] [address] rapid response, placed on BiPAP, Pt had lasix drip of a rate of 5ml. Unable to get O2 sat on Pt. Bed alarm on, Pt is A & O x4.

## 2023-07-28 ENCOUNTER — APPOINTMENT (OUTPATIENT)
Dept: GENERAL RADIOLOGY | Age: 78
DRG: 291 | End: 2023-07-28
Payer: MEDICARE

## 2023-07-28 PROBLEM — I50.21 ACUTE SYSTOLIC CONGESTIVE HEART FAILURE (HCC): Status: ACTIVE | Noted: 2023-07-28

## 2023-07-28 PROBLEM — R09.02 HYPOXIA: Status: ACTIVE | Noted: 2023-07-28

## 2023-07-28 LAB
ANION GAP SERPL CALCULATED.3IONS-SCNC: 9 MMOL/L (ref 3–16)
BASOPHILS # BLD: 0 K/UL (ref 0–0.2)
BASOPHILS NFR BLD: 0.4 %
BUN SERPL-MCNC: 26 MG/DL (ref 7–20)
CALCIUM SERPL-MCNC: 8.2 MG/DL (ref 8.3–10.6)
CHLORIDE SERPL-SCNC: 107 MMOL/L (ref 99–110)
CO2 SERPL-SCNC: 24 MMOL/L (ref 21–32)
CREAT SERPL-MCNC: 1 MG/DL (ref 0.6–1.2)
DEPRECATED RDW RBC AUTO: 15.4 % (ref 12.4–15.4)
EOSINOPHIL # BLD: 0.1 K/UL (ref 0–0.6)
EOSINOPHIL NFR BLD: 0.8 %
GFR SERPLBLD CREATININE-BSD FMLA CKD-EPI: 58 ML/MIN/{1.73_M2}
GLUCOSE SERPL-MCNC: 113 MG/DL (ref 70–99)
HCT VFR BLD AUTO: 42.8 % (ref 36–48)
HGB BLD-MCNC: 13.8 G/DL (ref 12–16)
LACTATE BLDV-SCNC: 2.7 MMOL/L (ref 0.4–2)
LV EF: 45 %
LVEF MODALITY: NORMAL
LYMPHOCYTES # BLD: 1.8 K/UL (ref 1–5.1)
LYMPHOCYTES NFR BLD: 19.3 %
MAGNESIUM SERPL-MCNC: 1.4 MG/DL (ref 1.8–2.4)
MCH RBC QN AUTO: 31.6 PG (ref 26–34)
MCHC RBC AUTO-ENTMCNC: 32.3 G/DL (ref 31–36)
MCV RBC AUTO: 97.7 FL (ref 80–100)
MONOCYTES # BLD: 1 K/UL (ref 0–1.3)
MONOCYTES NFR BLD: 11.1 %
NEUTROPHILS # BLD: 6.4 K/UL (ref 1.7–7.7)
NEUTROPHILS NFR BLD: 68.4 %
PLATELET # BLD AUTO: 183 K/UL (ref 135–450)
PMV BLD AUTO: 7.8 FL (ref 5–10.5)
POTASSIUM SERPL-SCNC: 3.9 MMOL/L (ref 3.5–5.1)
RBC # BLD AUTO: 4.38 M/UL (ref 4–5.2)
SODIUM SERPL-SCNC: 140 MMOL/L (ref 136–145)
WBC # BLD AUTO: 9.4 K/UL (ref 4–11)

## 2023-07-28 PROCEDURE — 2000000000 HC ICU R&B

## 2023-07-28 PROCEDURE — 6360000002 HC RX W HCPCS: Performed by: INTERNAL MEDICINE

## 2023-07-28 PROCEDURE — 99232 SBSQ HOSP IP/OBS MODERATE 35: CPT | Performed by: NURSE PRACTITIONER

## 2023-07-28 PROCEDURE — 97530 THERAPEUTIC ACTIVITIES: CPT

## 2023-07-28 PROCEDURE — 6370000000 HC RX 637 (ALT 250 FOR IP): Performed by: NURSE PRACTITIONER

## 2023-07-28 PROCEDURE — 83735 ASSAY OF MAGNESIUM: CPT

## 2023-07-28 PROCEDURE — 80048 BASIC METABOLIC PNL TOTAL CA: CPT

## 2023-07-28 PROCEDURE — 71045 X-RAY EXAM CHEST 1 VIEW: CPT

## 2023-07-28 PROCEDURE — 97535 SELF CARE MNGMENT TRAINING: CPT

## 2023-07-28 PROCEDURE — 2580000003 HC RX 258: Performed by: STUDENT IN AN ORGANIZED HEALTH CARE EDUCATION/TRAINING PROGRAM

## 2023-07-28 PROCEDURE — 2700000000 HC OXYGEN THERAPY PER DAY

## 2023-07-28 PROCEDURE — 94640 AIRWAY INHALATION TREATMENT: CPT

## 2023-07-28 PROCEDURE — 6370000000 HC RX 637 (ALT 250 FOR IP): Performed by: HOSPITALIST

## 2023-07-28 PROCEDURE — 6360000002 HC RX W HCPCS: Performed by: STUDENT IN AN ORGANIZED HEALTH CARE EDUCATION/TRAINING PROGRAM

## 2023-07-28 PROCEDURE — 99233 SBSQ HOSP IP/OBS HIGH 50: CPT | Performed by: INTERNAL MEDICINE

## 2023-07-28 PROCEDURE — 6360000002 HC RX W HCPCS: Performed by: HOSPITALIST

## 2023-07-28 PROCEDURE — 93308 TTE F-UP OR LMTD: CPT

## 2023-07-28 PROCEDURE — 94150 VITAL CAPACITY TEST: CPT

## 2023-07-28 PROCEDURE — 2500000003 HC RX 250 WO HCPCS: Performed by: STUDENT IN AN ORGANIZED HEALTH CARE EDUCATION/TRAINING PROGRAM

## 2023-07-28 PROCEDURE — 94761 N-INVAS EAR/PLS OXIMETRY MLT: CPT

## 2023-07-28 PROCEDURE — 6360000002 HC RX W HCPCS: Performed by: NURSE PRACTITIONER

## 2023-07-28 PROCEDURE — 99223 1ST HOSP IP/OBS HIGH 75: CPT | Performed by: INTERNAL MEDICINE

## 2023-07-28 PROCEDURE — 85025 COMPLETE CBC W/AUTO DIFF WBC: CPT

## 2023-07-28 PROCEDURE — 83605 ASSAY OF LACTIC ACID: CPT

## 2023-07-28 PROCEDURE — 6370000000 HC RX 637 (ALT 250 FOR IP): Performed by: INTERNAL MEDICINE

## 2023-07-28 RX ORDER — MAGNESIUM SULFATE IN WATER 40 MG/ML
2000 INJECTION, SOLUTION INTRAVENOUS ONCE
Status: COMPLETED | OUTPATIENT
Start: 2023-07-28 | End: 2023-07-28

## 2023-07-28 RX ORDER — DIGOXIN 0.25 MG/ML
125 INJECTION INTRAMUSCULAR; INTRAVENOUS DAILY
Status: DISCONTINUED | OUTPATIENT
Start: 2023-07-28 | End: 2023-07-29

## 2023-07-28 RX ORDER — IPRATROPIUM BROMIDE AND ALBUTEROL SULFATE 2.5; .5 MG/3ML; MG/3ML
1 SOLUTION RESPIRATORY (INHALATION)
Status: DISCONTINUED | OUTPATIENT
Start: 2023-07-28 | End: 2023-07-31

## 2023-07-28 RX ADMIN — MAGNESIUM SULFATE HEPTAHYDRATE 2000 MG: 40 INJECTION, SOLUTION INTRAVENOUS at 12:00

## 2023-07-28 RX ADMIN — ENOXAPARIN SODIUM 60 MG: 100 INJECTION SUBCUTANEOUS at 08:12

## 2023-07-28 RX ADMIN — DILTIAZEM HYDROCHLORIDE 60 MG: 60 CAPSULE, EXTENDED RELEASE ORAL at 21:31

## 2023-07-28 RX ADMIN — DIGOXIN 125 MCG: 0.25 INJECTION INTRAMUSCULAR; INTRAVENOUS at 11:59

## 2023-07-28 RX ADMIN — IPRATROPIUM BROMIDE AND ALBUTEROL SULFATE 1 DOSE: 2.5; .5 SOLUTION RESPIRATORY (INHALATION) at 20:34

## 2023-07-28 RX ADMIN — Medication 5 MG/HR: at 01:20

## 2023-07-28 RX ADMIN — IPRATROPIUM BROMIDE AND ALBUTEROL SULFATE 1 DOSE: 2.5; .5 SOLUTION RESPIRATORY (INHALATION) at 12:20

## 2023-07-28 RX ADMIN — IPRATROPIUM BROMIDE AND ALBUTEROL SULFATE 1 DOSE: 2.5; .5 SOLUTION RESPIRATORY (INHALATION) at 08:47

## 2023-07-28 RX ADMIN — Medication 5 MG/HR: at 18:20

## 2023-07-28 RX ADMIN — METOPROLOL TARTRATE 50 MG: 50 TABLET ORAL at 08:12

## 2023-07-28 RX ADMIN — DILTIAZEM HYDROCHLORIDE 60 MG: 60 CAPSULE, EXTENDED RELEASE ORAL at 08:12

## 2023-07-28 RX ADMIN — IPRATROPIUM BROMIDE AND ALBUTEROL SULFATE 1 DOSE: 2.5; .5 SOLUTION RESPIRATORY (INHALATION) at 16:19

## 2023-07-28 RX ADMIN — MAGNESIUM SULFATE HEPTAHYDRATE 2000 MG: 40 INJECTION, SOLUTION INTRAVENOUS at 08:15

## 2023-07-28 RX ADMIN — FUROSEMIDE 40 MG: 10 INJECTION, SOLUTION INTRAMUSCULAR; INTRAVENOUS at 08:12

## 2023-07-28 RX ADMIN — Medication 10 ML: at 08:12

## 2023-07-28 RX ADMIN — FUROSEMIDE 40 MG: 10 INJECTION, SOLUTION INTRAMUSCULAR; INTRAVENOUS at 17:06

## 2023-07-28 RX ADMIN — METOPROLOL TARTRATE 50 MG: 50 TABLET ORAL at 21:31

## 2023-07-28 ASSESSMENT — PAIN SCALES - GENERAL
PAINLEVEL_OUTOF10: 0

## 2023-07-28 NOTE — PROGRESS NOTES
V2.0    [facility] [place] Progress Note      Name:  Cait Martines /Age/Sex: 1945  (69 y.o. female)   MRN & CSN:  [id number] & [id number] Encounter Date/Time: 2023 9:45 AM EDT   Location:  [place] [address]-34 PCP: No primary care provider on file. Attending:Rashaad Ceja MD       Hospital Day: 4    Assessment and Recommendations   Cait Martines is a 69 y.o. female with pmh of  who presents with Acute respiratory failure with hypoxia ([address])      Plan:   Acute hypoxic respiratory failure  -Mickie secondary A-fib RVR CHF and also pleural effusion  -Status post thoracocentesis fluid analysis noted so far no culture growth likely transudative  -Patient was on Lasix drip due to IV access with transition to twice daily dosing for now  -Await cardiology recommendation  -Newly on 6 L nasal cannula continue to wean down    Hypokalemia secondary to above improving    A-fib with RVR  =-Still not controlled on Cardizem drip  -Continue to monitor titrate EP cardiology following as well  -Lovenox for anticoagulation for now  Replace magnesium 1.4      CHF acute on chronic with severe pulmonary hypertension  Improving slowly        Diet ADULT DIET; Regular; Low Fat/Low Chol/High Fiber/GARY  ADULT ORAL NUTRITION SUPPLEMENT; Breakfast, Lunch, Dinner; Standard High Calorie/High Protein Oral Supplement   DVT Prophylaxis [] Lovenox, []  Heparin, [] SCDs, [] Ambulation,  [] Eliquis, [] Xarelto  [] Coumadin   Code Status Full Code   Disposition   Patient requires continued admission due to    Surrogate Decision Maker/ POA       Personally reviewed Lab Studies and Imaging         Subjective:     Chief Complaint:     Cait Martines is a 69 y.o. female who presents with shortness of breath improving still on 6 L nasal cannula      Review of Systems:      Pertinent positives and negatives discussed in HPI    Objective:      Intake/Output Summary (Last 24 hours) at 2023 0945  Last data filed at 2023 0600  Gross per 24 hour !Yes       !Yes            ! None      ! +------------------------+----------+---------------+----------+ ! GSV Below Knee          ! Yes       ! Yes            ! None      ! +------------------------+----------+---------------+----------+ ! Gastroc                 ! Yes       ! Yes            ! None      ! +------------------------+----------+---------------+----------+ ! Soleal                  !Yes       ! Yes            ! None      ! +------------------------+----------+---------------+----------+ ! PTV                     ! Yes       ! Yes            ! None      ! +------------------------+----------+---------------+----------+ ! Peroneal                !Yes       ! Yes            ! None      ! +------------------------+----------+---------------+----------+ ! GSV Calf                ! Yes       ! Yes            ! None      ! +------------------------+----------+---------------+----------+ ! SSV                     ! Yes       ! Yes            ! None      ! +------------------------+----------+---------------+----------+ Left Doppler Measurements +------------------------+-----------+------+------------+ ! Location                ! Signal     !Reflux! Reflux (sec)! +------------------------+-----------+------+------------+ ! Sapheno Femoral Junction! Pulsatile  ! No    !            ! +------------------------+-----------+------+------------+ ! Common Femoral          !Pulsatile  ! No    !            ! +------------------------+-----------+------+------------+ ! Prox Femoral            !Spontaneous! No    !            ! +------------------------+-----------+------+------------+ ! Deep Femoral            !Spontaneous! No    !            ! +------------------------+-----------+------+------------+ ! Popliteal               !Spontaneous! No    !            ! +------------------------+-----------+------+------------+    IR GUIDED THORACENTESIS PLEURAL    Result Date: 7/27/2023  PROCEDURE: ULTRASOUND GUIDED THORACENTESIS 7/27/2023 HISTORY:

## 2023-07-28 NOTE — PROGRESS NOTES
Date/Time    GLUCOSE 113 07/28/2023 04:45 AM    CO2 24 07/28/2023 04:45 AM    BUN 26 07/28/2023 04:45 AM    CREATININE 1.0 07/28/2023 04:45 AM    CALCIUM 8.2 07/28/2023 04:45 AM     ABG:   Lab Results   Component Value Date/Time    EWT7UGA 22.4 07/27/2023 06:43 AM    BEART -2.5 07/27/2023 06:43 AM    Z0VIGKHC 96.9 07/27/2023 06:43 AM    PHART 7.373 07/27/2023 06:43 AM    JUK6OTL 38.6 07/27/2023 06:43 AM    PO2ART 83.2 07/27/2023 06:43 AM    SGA5YWT 53.0 07/27/2023 06:43 AM       Radiology: All pertinent images / reports were reviewed as a part of this visit. CTA OF THE CHEST 7/25/2023 5:07 pm     TECHNIQUE:  CTA of the chest was performed after the administration of intravenous  contrast.  Multiplanar reformatted images are provided for review. MIP  images are provided for review. Automated exposure control, iterative  reconstruction, and/or weight based adjustment of the mA/kV was utilized to  reduce the radiation dose to as low as reasonably achievable. COMPARISON:  None. HISTORY:  ORDERING SYSTEM PROVIDED HISTORY: Shortness of breath  TECHNOLOGIST PROVIDED HISTORY:  Reason for exam:->Shortness of breath  Decision Support Exception - unselect if not a suspected or confirmed  emergency medical condition->Emergency Medical Condition (MA)  Reason for Exam: Shortness of breath     FINDINGS:  Pulmonary Arteries: Pulmonary arteries are adequately opacified for  evaluation. No evidence of intraluminal filling defect to suggest pulmonary  embolism. Main pulmonary artery is normal in caliber. Mediastinum: No evidence of mediastinal lymphadenopathy. The heart is mildly  enlarged. Moderate-sized pericardial effusion. Fluid in the pericardial  recess. .  There is no acute abnormality of the thoracic aorta. Lungs/pleura: Large right pleural effusion with consolidation in the right  upper lobe, right middle lobe and right lower lobe. Right lower lobe bronchi  appear to be occluded distally.   Trachea is patent. Extensive emphysema. Mild opacity in the left lower lobe and a small left pleural effusion. Upper Abdomen: Extensive edema the in the soft tissues extending into the  breasts bilaterally. No enlarged lymphadenopathy. Edema extends into the  upper abdomen. Soft Tissues/Bones: Scoliosis. Spondylosis. Diffuse osteopenia. Compressions of several thoracic vertebral bodies. Buckling of the sternum  with an old fracture. This has healed with deformity. Osteoarthritic  changes at the glenohumeral joints. IMPRESSION:  1. No definite evidence for pulmonary emboli. 2. Large right pleural effusion with consolidation most pronounced in the  right lower lobe. 3. Moderate-sized pericardial effusion. 4. Extensive anasarca. 5. Old fracture of the sternum and appearance of chronic compressions of the  thoracic spine. Comparison with pain is recommended. Problem List:     Acute hypoxic respiratory failure  Right pleural effusion status postthoracentesis  Atrial fibrillation with RVR  CHF, systolic dysfunction and severe pulmonary hypertension  Assessment/Plan:     Acute hypoxia most likely related to CHF decompensation. Prior cardiac history unclear, states that she has not seen doctors for > 20 years. Atrial fibrillation with RVR-currently on IV Cardizem drip. The Lasix drip has now been switched to 40 mg twice daily. Pericardial effusion-enlarging with no tamponade features. Pericardiocentesis is currently on hold. Plans for repeat 2D echo on Monday. Status post right thoracentesis with 750 cc fluid removal, most likely related to CHF decompensation. Chemistries consistent with transudate. Acute hypoxia has improved, currently on 2 L O2. Related to CHF    Lovenox held due to concerns for enlarging pericardial effusion. Pulmonary will continue to follow.     Carly Hilliard MD

## 2023-07-28 NOTE — CONSULTS
[address]  [phone]      Reason for consult:  heart failure    ASSESSMENT AND PLAN:  Acute respiratory failure with hypoxia (HCC)  Active Problems:  Atrial fibrillation with RVR (HCC)  SIRS (systemic inflammatory response syndrome) (HCC)  Pleural effusion, right  Pericardial effusion  Acute pulmonary edema (HCC)  Centrilobular emphysema (HCC)  Hypoxia, likely chronic respiratory failure due to previously undiagnosed emphysema  Severe mitral and tricuspid regurgitation  Pulmonary hypertension, likely combination of group II and III PH (most likely diastolic heart failure and COPD)    Plan  -She is not currently in tamponade neither clinically nor by echo; however effusion appears slightly larger and she is at risk of tamponade, especially if she is overdiuresed  -She has significant pulmonary hypertension, which is likely why she is not currently in tamponade  -I discussed case with Dr. Earl Camilo. Given enlarging pericardial effusion, will stop all anticoagulation at this point  -She has had significant unintentional weight loss, which may reflect an occult malignancy  -So far pleural fluid has been nondiagnostic and just appears transudative from heart failure    -She has not seen a doctor in 48 years and has expressed that she doesn't want to continue seeing physicians  -Palliative care consult very important prior to proceeding to do any additional invasive procedures    -Currently no indication for emergent pericardiocentesis  -Closely monitor vital signs. Will very cautiously give diuretics at low dose over weekend  -Plan to repeat echo on Monday with possible elective pericardiocentesis  -I discussed with Dr. Sue Mccall about prudence/safety of re-evaluating effusion Monday versus sending to lab today, and he agrees with conservative approach as documented above, with low threshold to change course if she becomes hemodynamically unstable.     History of Present Illness:  Amy Cunha Component Value Date    TSHREFLEX 1.79 07/25/2023       SUMMARY OF CURRENT AND RECENT RELEVANT IMAGING:  Additional Imaging:  Results for orders placed during the hospital encounter of 07/25/23    XR CHEST PORTABLE  7/25 and 7/28  I personally and independently reviewed the images of this study today as part of my assessment of this patient. 7/25 - large right pleural effusion with mild pulmonary edema, cardiomegaly  7/28 - right pleural effusion much smaller, cardiomegaly    07/25/23    CT CHEST PULMONARY EMBOLISM W CONTRAST 07/25/2023  6:27 PM (Final)  I personally and independently reviewed the images of this study today as part of my assessment of this patient.  -No PE  -large right pleural effusion    Summary of rads report below:  Impression  1. No definite evidence for pulmonary emboli. 2. Large right pleural effusion with consolidation most pronounced in the  right lower lobe. 3. Moderate-sized pericardial effusion. 4. Extensive anasarca. 5. Old fracture of the sternum and appearance of chronic compressions of the  thoracic spine. Comparison with pain is recommended. Signed by: Paula Ulloa MD on 7/25/2023  6:27 PM      SUMMARY OF CURRENT AND RECENT RELEVANT CARDIAC TESTING:    CARDIAC IMAGING/TESTING:  EKG personally interpreted:   Results for orders placed or performed during the hospital encounter of 07/25/23   EKG 12 Lead   Result Value Ref Range    Ventricular Rate 188 BPM    Atrial Rate 227 BPM    QRS Duration 70 ms    Q-T Interval 236 ms    QTc Calculation (Bazett) 417 ms    R Axis 46 degrees    T Axis 173 degrees    Diagnosis       Atrial fibrillation with rapid ventricular responseLow voltage QRSAbnormal ECGConfirmed by Ronna Chaudhari ([id number]) on 7/25/2023 3:52:16 PM     Echo:   New limited echo today  I personally and independently reviewed the images of this study today as part of my assessment of this patient.   -LVEF still 45  -Moderate pericardial effusion, slightly larger than

## 2023-07-28 NOTE — PROGRESS NOTES
[facility] Department   Phone: [phone]    Occupational Therapy    [x] Initial Evaluation            [] Daily Treatment Note         [] Discharge Summary      Patient: Alejandrina Caldera   : 1945   MRN: [id number]   Date of Service:  2023    Admitting Diagnosis:  Acute respiratory failure with hypoxia [facility])  Current Admission Summary: 69 y.o. female with no significant past no history who presented to [facility] with complaints of shortness of breath. Patient states for the last 2-3 weeks she has been having progressively worsening shortness of breath. She has never had anything like this happen to her before. She states she noticed her breathing trouble started when the [place] were at their peak in late . Additionally, patient states used to smoke 1/2 pack/day, but at that time when she started to notice her shortness of breath, she decided to quit smoking as well. Additionally, she states that she has had bilateral leg swelling, which is new for her as well. No history of blood clots. No history of heart failure or MIs. Additionally, patient was found to be in atrial fibrillation with RVR in the ED. Patient has never had atrial fibrillation in the past.  Not on any blood thinners or antiarrhythmics. Patient denies chest pain, nausea, vomiting, GI/ symptoms, edema, fever, or chills. Former smoker. Quit 4 weeks ago. Used to smoke 1 pack/day for 50+ years. \"   - LE dropplers negative for DVT, RR for dyspnea and transfer to CVU    Past Medical History:  has no past medical history on file. Past Surgical History:  has no past surgical history on file. Discharge Recommendations: Alejandrina Caldera scored a 14/24 on the AM-PAC ADL Inpatient form. Current research shows that an AM-PAC score of 17 or less is typically not associated with a discharge to the patient's home setting.  Based on the patient's AM-PAC score and their current feeling \"fuzzy\" when standing. RN and MD aware of vitals  Impairments Requiring Therapeutic Intervention: decreased functional mobility, decreased ADL status, decreased strength, decreased endurance, decreased balance, decreased IADL  Prognosis: good  Clinical Assessment: patient presents with above deficits and is below baseline. Remains limited in activity tolerance secondary to elevated HR with minimal exertion. Recommend continued OT services to facilitate return to PLOF  Safety Interventions: patient left in bed, bed alarm in place, call light within reach, and nurse notified    Plan  Frequency: 3-5 x/per week  Current Treatment Recommendations: strengthening, balance training, functional mobility training, transfer training, endurance training, patient/caregiver education, ADL/self-care training, and equipment evaluation/education    Goals  Patient Goals: to return home   Short Term Goals:  Time Frame: discharge  Patient will complete upper body ADL at minimal assistance   Patient will complete lower body ADL at minimal assistance   Patient will complete toileting at minimal assistance - Continue for consistency  Patient will complete functional transfers at minimal assistance - Goal met, upgrade to SBA  Patient will complete functional mobility at minimal assistance - Goal met, upgrade to SBA     Above goals reviewed on 7/28/2023. All goals are ongoing at this time unless indicated above.      Therapy Session Time     Individual Group Co-treatment   Time In    1639   Time Out    1052   Minutes    57        Timed Code Treatment Minutes:   57  Total Treatment Minutes:  62       Electronically Signed By: JACOB Hernandez MOT OTR/L [id number]

## 2023-07-28 NOTE — PROGRESS NOTES
[facility] Department   Phone: [phone]    Physical Therapy    [] Initial Evaluation            [x] Daily Treatment Note         [] Discharge Summary      Patient: Tri Mohamud   : 1945   MRN: [id number]   Date of Service:  2023  Admitting Diagnosis: Acute respiratory failure with hypoxia [facility])  Current Admission Summary: Per H&P on  \"77 y.o. female with no significant past no history who presented to [facility] with complaints of shortness of breath. Patient states for the last 2-3 weeks she has been having progressively worsening shortness of breath. She has never had anything like this happen to her before. She states she noticed her breathing trouble started when the [place] were at their peak in late . Additionally, patient states used to smoke 1/2 pack/day, but at that time when she started to notice her shortness of breath, she decided to quit smoking as well. Additionally, she states that she has had bilateral leg swelling, which is new for her as well. No history of blood clots. No history of heart failure or MIs. Additionally, patient was found to be in atrial fibrillation with RVR in the ED. Patient has never had atrial fibrillation in the past.  Not on any blood thinners or antiarrhythmics. Patient denies chest pain, nausea, vomiting, GI/ symptoms, edema, fever, or chills. Former smoker. Quit 4 weeks ago. Used to smoke 1 pack/day for 50+ years. \"   - LE dropplers negative for DVT  Past Medical History:  has no past medical history on file. Past Surgical History:  has no past surgical history on file. Discharge Recommendations: Tri Mohamud scored a 17/24 on the AM-PAC short mobility form. Current research shows that an AM-PAC score of 17 or less is typically not associated with a discharge to the patient's home setting.  Based on the patient's AM-PAC score and their current functional mobility

## 2023-07-28 NOTE — PROGRESS NOTES
07/28/23 0003   RT Protocol   History Pulmonary Disease 2   Respiratory pattern 2   Breath sounds 4   Cough 2   Indications for Bronchodilator Therapy Decreased or absent breath sounds   Bronchodilator Assessment Score 10

## 2023-07-28 NOTE — CARE COORDINATION
Pt had thoracentesis yesterday. Currently on oxygen. CM will follow for need. Plan is home with Alexander lowe.     Deirdre Giraldo RN, BSN  [phone]

## 2023-07-28 NOTE — PLAN OF CARE
Problem: Discharge Planning  Goal: Discharge to home or other facility with appropriate resources  Outcome: Progressing  Flowsheets (Taken 7/28/2023 0600 by James Zurita RN)  Discharge to home or other facility with appropriate resources: Identify barriers to discharge with patient and caregiver     Problem: Safety - Adult  Goal: Free from fall injury  Outcome: Progressing

## 2023-07-28 NOTE — PROGRESS NOTES
[facility]   Electrophysiology Progress Note     Date: 7/28/2023  Admit Date: 7/25/2023     Reason for consultation: Atrial Fibrillation with RVR    Chief Complaint:   Chief Complaint   Patient presents with    Shortness of Breath     SOB for approx 3 week & swelling to right leg for approx 3 days. Denies cardiac/pulm hx. Noted afib RVR in triage. History of Present Illness: History obtained from patient and medical record. Melodie Zhong is a 69 y.o. female with a history of tobacco dependence. She does not follow with a primary care physician. She denies any history of HTN, CAD, COPD, LEILANI, DM, MI or palpitations. She does not drink alcohol. She reports she started to notice shortness of breath a month ago when the air quality was poor due to the [place]. She did not notice any fast heart rate or palpitations at that time. She has smoked for over 40 years and quit 2 weeks ago. She developed bilateral lower extremity edema which was worsening over the last month. She presented to the ED last night with increasing shortness of breath at rest and feeling her \"heart racing'. Interval Hx: Today, she is more alert, sitting up int he chair. She denies chest pain, feels shortness of breath is slightly improved compared to yesterday. On 7/27/2023 due to her worsening respiratory status she was transferred to ICU. Placed on NIPPV and Lasix drip. She remains in Afib with rates less than 130. She was hypotensive overnight with a order for Levophed drip if needed. She is set to have thoracentesis this morning. She was seen and examined. Clinical notes reviewed. Telemetry reviewed. Denies having chest pain, palpitations, cough, or dizziness at the time of this visit. Assessment and Plan:     New Onset Atrial Fibrillation  - Duration unknown, has had symptoms since June.    - likely secondary to hypoxia, CHF exacerbation  - no previous documentation of Atrial Fibrillation  -

## 2023-07-29 PROBLEM — I50.41 ACUTE COMBINED SYSTOLIC AND DIASTOLIC CONGESTIVE HEART FAILURE (HCC): Status: ACTIVE | Noted: 2023-07-28

## 2023-07-29 PROBLEM — J43.9 PULMONARY EMPHYSEMA (HCC): Status: ACTIVE | Noted: 2023-07-26

## 2023-07-29 LAB
ANION GAP SERPL CALCULATED.3IONS-SCNC: 8 MMOL/L (ref 3–16)
BACTERIA BLD CULT ORG #2: NORMAL
BACTERIA BLD CULT: NORMAL
BASOPHILS # BLD: 0.1 K/UL (ref 0–0.2)
BASOPHILS NFR BLD: 1.1 %
BUN SERPL-MCNC: 25 MG/DL (ref 7–20)
CALCIUM SERPL-MCNC: 8.6 MG/DL (ref 8.3–10.6)
CHLORIDE SERPL-SCNC: 104 MMOL/L (ref 99–110)
CO2 SERPL-SCNC: 27 MMOL/L (ref 21–32)
CREAT SERPL-MCNC: 0.6 MG/DL (ref 0.6–1.2)
DEPRECATED RDW RBC AUTO: 15 % (ref 12.4–15.4)
DIGOXIN SERPL-MCNC: 0.7 NG/ML (ref 0.8–2)
EOSINOPHIL # BLD: 0.1 K/UL (ref 0–0.6)
EOSINOPHIL NFR BLD: 1.5 %
GFR SERPLBLD CREATININE-BSD FMLA CKD-EPI: >60 ML/MIN/{1.73_M2}
GLUCOSE SERPL-MCNC: 102 MG/DL (ref 70–99)
HCT VFR BLD AUTO: 43.7 % (ref 36–48)
HGB BLD-MCNC: 14.2 G/DL (ref 12–16)
LYMPHOCYTES # BLD: 1.8 K/UL (ref 1–5.1)
LYMPHOCYTES NFR BLD: 21.2 %
MAGNESIUM SERPL-MCNC: 1.9 MG/DL (ref 1.8–2.4)
MCH RBC QN AUTO: 31.8 PG (ref 26–34)
MCHC RBC AUTO-ENTMCNC: 32.4 G/DL (ref 31–36)
MCV RBC AUTO: 98 FL (ref 80–100)
MONOCYTES # BLD: 0.8 K/UL (ref 0–1.3)
MONOCYTES NFR BLD: 10 %
NEUTROPHILS # BLD: 5.6 K/UL (ref 1.7–7.7)
NEUTROPHILS NFR BLD: 66.2 %
PLATELET # BLD AUTO: 188 K/UL (ref 135–450)
PMV BLD AUTO: 8.1 FL (ref 5–10.5)
POTASSIUM SERPL-SCNC: 3.8 MMOL/L (ref 3.5–5.1)
RBC # BLD AUTO: 4.46 M/UL (ref 4–5.2)
SODIUM SERPL-SCNC: 139 MMOL/L (ref 136–145)
WBC # BLD AUTO: 8.4 K/UL (ref 4–11)

## 2023-07-29 PROCEDURE — 6370000000 HC RX 637 (ALT 250 FOR IP): Performed by: HOSPITALIST

## 2023-07-29 PROCEDURE — 6360000002 HC RX W HCPCS: Performed by: HOSPITALIST

## 2023-07-29 PROCEDURE — 6370000000 HC RX 637 (ALT 250 FOR IP): Performed by: NURSE PRACTITIONER

## 2023-07-29 PROCEDURE — 94640 AIRWAY INHALATION TREATMENT: CPT

## 2023-07-29 PROCEDURE — 94761 N-INVAS EAR/PLS OXIMETRY MLT: CPT

## 2023-07-29 PROCEDURE — 2580000003 HC RX 258: Performed by: STUDENT IN AN ORGANIZED HEALTH CARE EDUCATION/TRAINING PROGRAM

## 2023-07-29 PROCEDURE — 85025 COMPLETE CBC W/AUTO DIFF WBC: CPT

## 2023-07-29 PROCEDURE — 99232 SBSQ HOSP IP/OBS MODERATE 35: CPT | Performed by: INTERNAL MEDICINE

## 2023-07-29 PROCEDURE — 6370000000 HC RX 637 (ALT 250 FOR IP): Performed by: INTERNAL MEDICINE

## 2023-07-29 PROCEDURE — 83735 ASSAY OF MAGNESIUM: CPT

## 2023-07-29 PROCEDURE — 2700000000 HC OXYGEN THERAPY PER DAY

## 2023-07-29 PROCEDURE — 80048 BASIC METABOLIC PNL TOTAL CA: CPT

## 2023-07-29 PROCEDURE — 2000000000 HC ICU R&B

## 2023-07-29 PROCEDURE — 80162 ASSAY OF DIGOXIN TOTAL: CPT

## 2023-07-29 PROCEDURE — 6360000002 HC RX W HCPCS: Performed by: NURSE PRACTITIONER

## 2023-07-29 PROCEDURE — 99497 ADVNCD CARE PLAN 30 MIN: CPT | Performed by: INTERNAL MEDICINE

## 2023-07-29 PROCEDURE — 2500000003 HC RX 250 WO HCPCS: Performed by: STUDENT IN AN ORGANIZED HEALTH CARE EDUCATION/TRAINING PROGRAM

## 2023-07-29 PROCEDURE — 99233 SBSQ HOSP IP/OBS HIGH 50: CPT | Performed by: INTERNAL MEDICINE

## 2023-07-29 RX ORDER — DIGOXIN 125 MCG
250 TABLET ORAL DAILY
Status: DISCONTINUED | OUTPATIENT
Start: 2023-07-30 | End: 2023-08-01

## 2023-07-29 RX ORDER — DIGOXIN 125 MCG
125 TABLET ORAL DAILY
Status: COMPLETED | OUTPATIENT
Start: 2023-07-29 | End: 2023-07-29

## 2023-07-29 RX ADMIN — IPRATROPIUM BROMIDE AND ALBUTEROL SULFATE 1 DOSE: 2.5; .5 SOLUTION RESPIRATORY (INHALATION) at 09:53

## 2023-07-29 RX ADMIN — DIGOXIN 125 MCG: 0.25 INJECTION INTRAMUSCULAR; INTRAVENOUS at 09:13

## 2023-07-29 RX ADMIN — Medication 2.5 MG/HR: at 18:41

## 2023-07-29 RX ADMIN — Medication 10 ML: at 22:08

## 2023-07-29 RX ADMIN — IPRATROPIUM BROMIDE AND ALBUTEROL SULFATE 1 DOSE: 2.5; .5 SOLUTION RESPIRATORY (INHALATION) at 16:24

## 2023-07-29 RX ADMIN — FUROSEMIDE 40 MG: 10 INJECTION, SOLUTION INTRAMUSCULAR; INTRAVENOUS at 09:13

## 2023-07-29 RX ADMIN — DIGOXIN 125 MCG: 125 TABLET ORAL at 14:00

## 2023-07-29 RX ADMIN — DILTIAZEM HYDROCHLORIDE 60 MG: 60 CAPSULE, EXTENDED RELEASE ORAL at 20:39

## 2023-07-29 RX ADMIN — IPRATROPIUM BROMIDE AND ALBUTEROL SULFATE 1 DOSE: 2.5; .5 SOLUTION RESPIRATORY (INHALATION) at 13:30

## 2023-07-29 RX ADMIN — METOPROLOL TARTRATE 50 MG: 50 TABLET ORAL at 10:41

## 2023-07-29 RX ADMIN — Medication 10 ML: at 09:14

## 2023-07-29 RX ADMIN — FUROSEMIDE 40 MG: 10 INJECTION, SOLUTION INTRAMUSCULAR; INTRAVENOUS at 18:01

## 2023-07-29 RX ADMIN — IPRATROPIUM BROMIDE AND ALBUTEROL SULFATE 1 DOSE: 2.5; .5 SOLUTION RESPIRATORY (INHALATION) at 20:03

## 2023-07-29 RX ADMIN — METOPROLOL TARTRATE 50 MG: 50 TABLET ORAL at 20:39

## 2023-07-29 RX ADMIN — DILTIAZEM HYDROCHLORIDE 60 MG: 60 CAPSULE, EXTENDED RELEASE ORAL at 09:13

## 2023-07-29 ASSESSMENT — PAIN SCALES - GENERAL
PAINLEVEL_OUTOF10: 0

## 2023-07-29 NOTE — PROGRESS NOTES
Vascular  Physician                             Physician           Kira Szymanski MD,                                                            [facility] [place]  Procedure Type of Study:   Veins:Lower Extremities DVT Study, VASC EXTREMITY VENOUS DUPLEX BILATERAL. Vascular Sonographer Report  Additional Indications:Pain and swelling Impressions Right Impression No evidence of deep vein or superficial vein thrombosis involving the right lower extremity. Pulsatile venous flow noted in the right lower extremity. Left Impression No evidence of deep vein or superficial vein thrombosis involving the left lower extremity. Pulsatile venous flow noted in the left lower extremity. Conclusions   Summary   -There is no evidence of deep or superficial venous thrombosis involving the  bilateral lower extremities. -Pulsatile venous flow noted in the bilateral lower extremities. Signature   ------------------------------------------------------------------  Electronically signed by Kira Szymanski MD, [facility] [place] (Interpreting  physician) on 07/26/2023 at 12:22 PM  ------------------------------------------------------------------  Patient Status:Routine. [facility] - Vascular Lab. Technical Quality:Limited visualization due to swelling. Velocities are measured in cm/s ; Diameters are measured in mm Right Lower Extremities DVT Study Measurements Right 2D Measurements +------------------------+----------+---------------+----------+ ! Location                ! Visualized! Compressibility! Thrombosis! +------------------------+----------+---------------+----------+ ! Sapheno Femoral Junction! Yes       ! Yes            ! None      ! +------------------------+----------+---------------+----------+ ! GSV Thigh               ! Yes       ! Yes            ! None      ! +------------------------+----------+---------------+----------+ ! Common Femoral          !Yes       ! Yes            ! None      ! !None      ! +------------------------+----------+---------------+----------+ ! Soleal                  !Yes       ! Yes            ! None      ! +------------------------+----------+---------------+----------+ ! PTV                     ! Yes       ! Yes            ! None      ! +------------------------+----------+---------------+----------+ ! Peroneal                !Yes       ! Yes            ! None      ! +------------------------+----------+---------------+----------+ ! GSV Calf                ! Yes       ! Yes            ! None      ! +------------------------+----------+---------------+----------+ ! SSV                     ! Yes       ! Yes            ! None      ! +------------------------+----------+---------------+----------+ Left Doppler Measurements +------------------------+-----------+------+------------+ ! Location                ! Signal     !Reflux! Reflux (sec)! +------------------------+-----------+------+------------+ ! Sapheno Femoral Junction! Pulsatile  ! No    !            ! +------------------------+-----------+------+------------+ ! Common Femoral          !Pulsatile  ! No    !            ! +------------------------+-----------+------+------------+ ! Prox Femoral            !Spontaneous! No    !            ! +------------------------+-----------+------+------------+ ! Deep Femoral            !Spontaneous! No    !            ! +------------------------+-----------+------+------------+ ! Popliteal               !Spontaneous! No    !            ! +------------------------+-----------+------+------------+    IR GUIDED THORACENTESIS PLEURAL    Result Date: 7/27/2023  PROCEDURE: ULTRASOUND GUIDED THORACENTESIS 7/27/2023 HISTORY: ORDERING SYSTEM PROVIDED HISTORY: Right pleural effusion TECHNOLOGIST PROVIDED HISTORY: Reason for exam:->Right pleural effusion Which side should the procedure be performed?->Right PHYSICIANS: Oralia Truong M.D. TECHNIQUE: Informed consent was obtained following a detailed explanation of the

## 2023-07-29 NOTE — PROGRESS NOTES
[facility] Pulmonary/CCM Progress note      Admit Date: 7/25/2023    Chief Complaint: Shortness of breath    Subjective: Interval History: Still dyspneic and hypoxic. A-fib with RVR-rate in the 90s to low 100s. Hemodynamically stable. No hypotension reported. Patient denies any syncopal episodes or dizziness. Denies chest pain.     Scheduled Meds:   [START ON 7/30/2023] digoxin  250 mcg Oral Daily    digoxin  125 mcg Oral Daily    ipratropium 0.5 mg-albuterol 2.5 mg  1 Dose Inhalation 4x Daily RT    sodium bicarbonate  150 mEq IntraVENous Once    furosemide  40 mg IntraVENous BID    metoprolol tartrate  50 mg Oral BID    dilTIAZem  60 mg Oral BID    sodium chloride flush  5-40 mL IntraVENous 2 times per day     Continuous Infusions:   dilTIAZem Stopped (07/29/23 0644)    sodium chloride       PRN Meds:perflutren lipid microspheres, sodium chloride flush, sodium chloride, ondansetron **OR** ondansetron, polyethylene glycol, acetaminophen **OR** acetaminophen, magnesium sulfate, potassium chloride **OR** potassium alternative oral replacement **OR** potassium chloride, albuterol    Review of Systems  Constitutional: negative for fatigue, fevers, malaise and weight loss  Ears, nose, mouth, throat: negative for ear drainage, epistaxis, hoarseness, nasal congestion, sore throat and voice change  Respiratory: negative except for shortness of breath  Cardiovascular: negative for chest pain, chest pressure/discomfort, irregular heart beat, lower extremity edema and palpitations  Gastrointestinal: negative for abdominal pain, constipation, diarrhea, jaundice, melena, odynophagia, reflux symptoms and vomiting  Hematologic/lymphatic: negative for bleeding, easy bruising, lymphadenopathy and petechiae  Musculoskeletal:negative for arthralgias, bone pain, muscle weakness, neck pain and stiff joints  Neurological: negative for dizziness, gait problems, headaches, seizures, speech problems, tremors and weakness  Behavioral/Psych:

## 2023-07-29 NOTE — PLAN OF CARE
Problem: Safety - Adult  Goal: Free from fall injury  Outcome: Progressing  Note: Patient aware of need to call for assistance when getting out of bed. Problem: Cardiovascular - Adult  Goal: Maintains optimal cardiac output and hemodynamic stability  Outcome: Progressing     Problem: Cardiovascular - Adult  Goal: Absence of cardiac dysrhythmias or at baseline  Outcome: Progressing  Flowsheets (Taken 7/28/2023 2322)  Absence of cardiac dysrhythmias or at baseline:   Monitor cardiac rate and rhythm   Administer antiarrhythmia medication and electrolyte replacement as ordered   Assess for signs of decreased cardiac output  Note: Cardizem gtt titrated for HR and rhythm. PO cardizem and Metoprolol. HR bouncing between 90s and 110's, 90's at rest and jumping up quickly with any exertion. Remains in Afib. Problem: Musculoskeletal - Adult  Goal: Return mobility to safest level of function  Outcome: Progressing     Problem: Respiratory - Adult  Goal: Achieves optimal ventilation and oxygenation  Outcome: Progressing  Flowsheets (Taken 7/28/2023 2322)  Achieves optimal ventilation and oxygenation:   Assess for changes in respiratory status   Position to facilitate oxygenation and minimize respiratory effort   Respiratory therapy support as indicated   Assess for changes in mentation and behavior   Oxygen supplementation based on oxygen saturation or arterial blood gases   Assess and instruct to report shortness of breath or any respiratory difficulty  Note: O2 at 4L NC, continuing to refuse bipap at night. Respirations easy, unlabored while at rest. Becomes short of breath with any exertion. Denies cough/sputum.

## 2023-07-29 NOTE — ACP (ADVANCE CARE PLANNING)
ADVANCED CARE PLANNING    Name:Vane Silva       :  1945              MRN:  [id number]      Purpose of Encounter: Advanced care planning in light of multiple cardiac issues. Parties in attendance: :Vikram Eason MD, Family members: None. Decisional Capacity:Yes    Subjective/Patient Story: Patient/family understand(s) that  her function continues to deteriorate. Patient/family wish(es) to continue further interventions and remain FULL code:  Yes    Objective/Medical Story: Patient presents with shortness of breath, work-up suggestive of CHF and atrial fibrillation. There is also now concerns for pericardial effusion. Bilateral pleural effusions, status post left thoracentesis. Goals of Care Determinations: Patient/family wish(es for full code. Plan: Will notify No primary care provider on file. of change in care plan. Will look at further interventions as needed. Code Status:  At this time patient wishes to be Full Code    Time Spent on Advanced Planning Discussion: 15 minutes    Electronically signed by Vikram Germain MD on 2023 at 11:42 AM

## 2023-07-29 NOTE — PROGRESS NOTES
Incentive Spirometry education and demonstration completed by Respiratory Therapy Yes      Response to education: Fair     Teaching Time: 5 minutes  524  Minimum Predicted Vital Capacity - 524 mL. Patient's Actual Vital Capacity - 500 mL.  Turning over to Nursing for routine follow-up No.    Comments: pt. Requires more education on IS    Electronically signed by Jose Alonso RCP on 7/29/2023 at 12:43 AM

## 2023-07-30 LAB
BACTERIA FLD AEROBE CULT: NORMAL
DIGOXIN SERPL-MCNC: 0.9 NG/ML (ref 0.8–2)
GRAM STN SPEC: NORMAL
MAGNESIUM SERPL-MCNC: 1.4 MG/DL (ref 1.8–2.4)

## 2023-07-30 PROCEDURE — 99233 SBSQ HOSP IP/OBS HIGH 50: CPT | Performed by: INTERNAL MEDICINE

## 2023-07-30 PROCEDURE — 6370000000 HC RX 637 (ALT 250 FOR IP): Performed by: HOSPITALIST

## 2023-07-30 PROCEDURE — 6360000002 HC RX W HCPCS: Performed by: STUDENT IN AN ORGANIZED HEALTH CARE EDUCATION/TRAINING PROGRAM

## 2023-07-30 PROCEDURE — 2700000000 HC OXYGEN THERAPY PER DAY

## 2023-07-30 PROCEDURE — 80162 ASSAY OF DIGOXIN TOTAL: CPT

## 2023-07-30 PROCEDURE — 6360000002 HC RX W HCPCS: Performed by: HOSPITALIST

## 2023-07-30 PROCEDURE — 94640 AIRWAY INHALATION TREATMENT: CPT

## 2023-07-30 PROCEDURE — 6370000000 HC RX 637 (ALT 250 FOR IP): Performed by: INTERNAL MEDICINE

## 2023-07-30 PROCEDURE — 2000000000 HC ICU R&B

## 2023-07-30 PROCEDURE — 83735 ASSAY OF MAGNESIUM: CPT

## 2023-07-30 PROCEDURE — 2580000003 HC RX 258: Performed by: STUDENT IN AN ORGANIZED HEALTH CARE EDUCATION/TRAINING PROGRAM

## 2023-07-30 PROCEDURE — 6370000000 HC RX 637 (ALT 250 FOR IP): Performed by: NURSE PRACTITIONER

## 2023-07-30 PROCEDURE — 99232 SBSQ HOSP IP/OBS MODERATE 35: CPT | Performed by: INTERNAL MEDICINE

## 2023-07-30 PROCEDURE — 94761 N-INVAS EAR/PLS OXIMETRY MLT: CPT

## 2023-07-30 RX ADMIN — IPRATROPIUM BROMIDE AND ALBUTEROL SULFATE 1 DOSE: 2.5; .5 SOLUTION RESPIRATORY (INHALATION) at 07:56

## 2023-07-30 RX ADMIN — DILTIAZEM HYDROCHLORIDE 60 MG: 60 CAPSULE, EXTENDED RELEASE ORAL at 09:21

## 2023-07-30 RX ADMIN — METOPROLOL TARTRATE 50 MG: 50 TABLET ORAL at 09:21

## 2023-07-30 RX ADMIN — Medication 10 ML: at 09:31

## 2023-07-30 RX ADMIN — Medication 10 ML: at 19:44

## 2023-07-30 RX ADMIN — FUROSEMIDE 40 MG: 10 INJECTION, SOLUTION INTRAMUSCULAR; INTRAVENOUS at 17:04

## 2023-07-30 RX ADMIN — DILTIAZEM HYDROCHLORIDE 60 MG: 60 CAPSULE, EXTENDED RELEASE ORAL at 19:44

## 2023-07-30 RX ADMIN — IPRATROPIUM BROMIDE AND ALBUTEROL SULFATE 1 DOSE: 2.5; .5 SOLUTION RESPIRATORY (INHALATION) at 20:01

## 2023-07-30 RX ADMIN — MAGNESIUM SULFATE HEPTAHYDRATE 2000 MG: 40 INJECTION, SOLUTION INTRAVENOUS at 12:57

## 2023-07-30 RX ADMIN — FUROSEMIDE 40 MG: 10 INJECTION, SOLUTION INTRAMUSCULAR; INTRAVENOUS at 09:21

## 2023-07-30 RX ADMIN — METOPROLOL TARTRATE 50 MG: 50 TABLET ORAL at 18:00

## 2023-07-30 RX ADMIN — IPRATROPIUM BROMIDE AND ALBUTEROL SULFATE 1 DOSE: 2.5; .5 SOLUTION RESPIRATORY (INHALATION) at 11:33

## 2023-07-30 RX ADMIN — DIGOXIN 250 MCG: 125 TABLET ORAL at 09:21

## 2023-07-30 RX ADMIN — SODIUM CHLORIDE, PRESERVATIVE FREE 10 ML: 5 INJECTION INTRAVENOUS at 17:07

## 2023-07-30 RX ADMIN — IPRATROPIUM BROMIDE AND ALBUTEROL SULFATE 1 DOSE: 2.5; .5 SOLUTION RESPIRATORY (INHALATION) at 15:32

## 2023-07-30 ASSESSMENT — PAIN SCALES - GENERAL
PAINLEVEL_OUTOF10: 0

## 2023-07-30 NOTE — PROGRESS NOTES
[facility] Pulmonary/CCM Progress note      Admit Date: 7/25/2023    Chief Complaint: Shortness of breath    Subjective: Interval History: Dyspnea and hypoxia, rate controlled atrial fibrillation-on and off Cardizem drip. Blood pressure has been stable. Currently on 4 L O2.     Scheduled Meds:   digoxin  250 mcg Oral Daily    ipratropium 0.5 mg-albuterol 2.5 mg  1 Dose Inhalation 4x Daily RT    sodium bicarbonate  150 mEq IntraVENous Once    furosemide  40 mg IntraVENous BID    metoprolol tartrate  50 mg Oral BID    dilTIAZem  60 mg Oral BID    sodium chloride flush  5-40 mL IntraVENous 2 times per day     Continuous Infusions:   dilTIAZem Stopped (07/30/23 0618)    sodium chloride       PRN Meds:perflutren lipid microspheres, sodium chloride flush, sodium chloride, ondansetron **OR** ondansetron, polyethylene glycol, acetaminophen **OR** acetaminophen, magnesium sulfate, potassium chloride **OR** potassium alternative oral replacement **OR** potassium chloride, albuterol    Review of Systems  Constitutional: negative for fatigue, fevers, malaise and weight loss  Ears, nose, mouth, throat: negative for ear drainage, epistaxis, hoarseness, nasal congestion, sore throat and voice change  Respiratory: negative except for shortness of breath  Cardiovascular: negative for chest pain, chest pressure/discomfort, irregular heart beat, lower extremity edema and palpitations  Gastrointestinal: negative for abdominal pain, constipation, diarrhea, jaundice, melena, odynophagia, reflux symptoms and vomiting  Hematologic/lymphatic: negative for bleeding, easy bruising, lymphadenopathy and petechiae  Musculoskeletal:negative for arthralgias, bone pain, muscle weakness, neck pain and stiff joints  Neurological: negative for dizziness, gait problems, headaches, seizures, speech problems, tremors and weakness  Behavioral/Psych: negative for anxiety, behavior problems, depression, fatigue and sleep disturbance  Endocrine: negative for

## 2023-07-30 NOTE — PLAN OF CARE
Problem: Safety - Adult  Goal: Free from fall injury  Outcome: Progressing  Note: Patient reminded to use call light for assistance if she needs to get up out of bed. V/u. Problem: Cardiovascular - Adult  Goal: Maintains optimal cardiac output and hemodynamic stability  Outcome: Progressing  Flowsheets (Taken 7/29/2023 2316)  Maintains optimal cardiac output and hemodynamic stability:   Monitor blood pressure and heart rate   Monitor urine output and notify Licensed Independent Practitioner for values outside of normal range   Assess for signs of decreased cardiac output  Note: HR bouncing between 80s up to 130s with any activity, including even when she is just awake in bed. Remains in Afib. Cardizem titrated for HR, PO cardiac meds also given as ordered. Will continue to monitor and titrate per orders     Problem: Musculoskeletal - Adult  Goal: Return mobility to safest level of function  Outcome: Progressing  Flowsheets (Taken 7/29/2023 2316)  Return Mobility to Safest Level of Function: Assist with transfers and ambulation using safe patient handling equipment as needed     Problem: Respiratory - Adult  Goal: Achieves optimal ventilation and oxygenation  Outcome: Progressing  Flowsheets (Taken 7/29/2023 2316)  Achieves optimal ventilation and oxygenation:   Assess for changes in respiratory status   Position to facilitate oxygenation and minimize respiratory effort   Oxygen supplementation based on oxygen saturation or arterial blood gases  Note: O2 remains at 3L NC, tolerated well.  Continues to refuse bipap at night       Problem: Pain  Goal: Verbalizes/displays adequate comfort level or baseline comfort level  Outcome: Progressing  Flowsheets (Taken 7/29/2023 2316)  Verbalizes/displays adequate comfort level or baseline comfort level:   Encourage patient to monitor pain and request assistance   Assess pain using appropriate pain scale

## 2023-07-30 NOTE — PROGRESS NOTES
!None      ! +------------------------+----------+---------------+----------+ ! PTV                     ! Yes       ! Yes            ! None      ! +------------------------+----------+---------------+----------+ ! Peroneal                !Yes       ! Yes            ! None      ! +------------------------+----------+---------------+----------+ ! GSV Calf                ! Yes       ! Yes            ! None      ! +------------------------+----------+---------------+----------+ ! SSV                     ! Yes       ! Yes            ! None      ! +------------------------+----------+---------------+----------+ Left Doppler Measurements +------------------------+-----------+------+------------+ ! Location                ! Signal     !Reflux! Reflux (sec)! +------------------------+-----------+------+------------+ ! Sapheno Femoral Junction! Pulsatile  ! No    !            ! +------------------------+-----------+------+------------+ ! Common Femoral          !Pulsatile  ! No    !            ! +------------------------+-----------+------+------------+ ! Prox Femoral            !Spontaneous! No    !            ! +------------------------+-----------+------+------------+ ! Deep Femoral            !Spontaneous! No    !            ! +------------------------+-----------+------+------------+ ! Popliteal               !Spontaneous! No    !            ! +------------------------+-----------+------+------------+    IR GUIDED THORACENTESIS PLEURAL    Result Date: 7/27/2023  PROCEDURE: ULTRASOUND GUIDED THORACENTESIS 7/27/2023 HISTORY: ORDERING SYSTEM PROVIDED HISTORY: Right pleural effusion TECHNOLOGIST PROVIDED HISTORY: Reason for exam:->Right pleural effusion Which side should the procedure be performed?->Right PHYSICIANS: Armando Pulido M.D. TECHNIQUE: Informed consent was obtained following a detailed explanation of the procedure including risks, benefits, and alternatives. Universal protocol was performed.  The right chest was than prepped and draped

## 2023-07-31 LAB
LV EF: 55 %
LVEF MODALITY: NORMAL
MAGNESIUM SERPL-MCNC: 1.6 MG/DL (ref 1.8–2.4)

## 2023-07-31 PROCEDURE — 2700000000 HC OXYGEN THERAPY PER DAY

## 2023-07-31 PROCEDURE — 2500000003 HC RX 250 WO HCPCS: Performed by: STUDENT IN AN ORGANIZED HEALTH CARE EDUCATION/TRAINING PROGRAM

## 2023-07-31 PROCEDURE — 93308 TTE F-UP OR LMTD: CPT

## 2023-07-31 PROCEDURE — 83735 ASSAY OF MAGNESIUM: CPT

## 2023-07-31 PROCEDURE — 97535 SELF CARE MNGMENT TRAINING: CPT

## 2023-07-31 PROCEDURE — 6370000000 HC RX 637 (ALT 250 FOR IP): Performed by: INTERNAL MEDICINE

## 2023-07-31 PROCEDURE — 6360000002 HC RX W HCPCS: Performed by: HOSPITALIST

## 2023-07-31 PROCEDURE — 2000000000 HC ICU R&B

## 2023-07-31 PROCEDURE — 6370000000 HC RX 637 (ALT 250 FOR IP): Performed by: NURSE PRACTITIONER

## 2023-07-31 PROCEDURE — 94761 N-INVAS EAR/PLS OXIMETRY MLT: CPT

## 2023-07-31 PROCEDURE — 94640 AIRWAY INHALATION TREATMENT: CPT

## 2023-07-31 PROCEDURE — 6360000002 HC RX W HCPCS: Performed by: STUDENT IN AN ORGANIZED HEALTH CARE EDUCATION/TRAINING PROGRAM

## 2023-07-31 PROCEDURE — 6370000000 HC RX 637 (ALT 250 FOR IP): Performed by: HOSPITALIST

## 2023-07-31 PROCEDURE — 2580000003 HC RX 258: Performed by: STUDENT IN AN ORGANIZED HEALTH CARE EDUCATION/TRAINING PROGRAM

## 2023-07-31 PROCEDURE — 97530 THERAPEUTIC ACTIVITIES: CPT

## 2023-07-31 RX ORDER — DILTIAZEM HYDROCHLORIDE 90 MG/1
90 CAPSULE, EXTENDED RELEASE ORAL 2 TIMES DAILY
Status: DISCONTINUED | OUTPATIENT
Start: 2023-07-31 | End: 2023-08-01

## 2023-07-31 RX ORDER — IPRATROPIUM BROMIDE AND ALBUTEROL SULFATE 2.5; .5 MG/3ML; MG/3ML
1 SOLUTION RESPIRATORY (INHALATION)
Status: DISCONTINUED | OUTPATIENT
Start: 2023-07-31 | End: 2023-08-04

## 2023-07-31 RX ADMIN — SODIUM CHLORIDE: 9 INJECTION, SOLUTION INTRAVENOUS at 04:52

## 2023-07-31 RX ADMIN — IPRATROPIUM BROMIDE AND ALBUTEROL SULFATE 1 DOSE: 2.5; .5 SOLUTION RESPIRATORY (INHALATION) at 08:07

## 2023-07-31 RX ADMIN — MAGNESIUM SULFATE HEPTAHYDRATE 2000 MG: 40 INJECTION, SOLUTION INTRAVENOUS at 04:56

## 2023-07-31 RX ADMIN — METOPROLOL TARTRATE 50 MG: 50 TABLET ORAL at 17:00

## 2023-07-31 RX ADMIN — FUROSEMIDE 40 MG: 10 INJECTION, SOLUTION INTRAMUSCULAR; INTRAVENOUS at 08:11

## 2023-07-31 RX ADMIN — Medication 10 ML: at 08:11

## 2023-07-31 RX ADMIN — METOPROLOL TARTRATE 50 MG: 50 TABLET ORAL at 08:11

## 2023-07-31 RX ADMIN — DILTIAZEM HYDROCHLORIDE 60 MG: 60 CAPSULE, EXTENDED RELEASE ORAL at 08:11

## 2023-07-31 RX ADMIN — Medication 2.5 MG/HR: at 18:28

## 2023-07-31 RX ADMIN — DIGOXIN 250 MCG: 125 TABLET ORAL at 08:11

## 2023-07-31 RX ADMIN — FUROSEMIDE 40 MG: 10 INJECTION, SOLUTION INTRAMUSCULAR; INTRAVENOUS at 18:08

## 2023-07-31 RX ADMIN — IPRATROPIUM BROMIDE AND ALBUTEROL SULFATE 1 DOSE: 2.5; .5 SOLUTION RESPIRATORY (INHALATION) at 20:09

## 2023-07-31 RX ADMIN — DILTIAZEM HYDROCHLORIDE 90 MG: 90 CAPSULE, EXTENDED RELEASE ORAL at 19:00

## 2023-07-31 ASSESSMENT — PAIN SCALES - GENERAL
PAINLEVEL_OUTOF10: 0
PAINLEVEL_OUTOF10: 0

## 2023-07-31 ASSESSMENT — ENCOUNTER SYMPTOMS: SHORTNESS OF BREATH: 1

## 2023-07-31 NOTE — PROGRESS NOTES
07/31/23 0027   RT Protocol   History Pulmonary Disease 1   Respiratory pattern 0   Breath sounds 2   Cough 3   Indications for Bronchodilator Therapy None   Bronchodilator Assessment Score 6

## 2023-07-31 NOTE — PROGRESS NOTES
[facility] Department   Phone: [phone]    Occupational Therapy    [] Initial Evaluation            [x] Daily Treatment Note         [] Discharge Summary      Patient: Kyle Vigil   : 1945   MRN: [id number]   Date of Service:  2023    Admitting Diagnosis:  Acute respiratory failure with hypoxia [facility])  Current Admission Summary: 69 y.o. female with no significant past no history who presented to [facility] with complaints of shortness of breath. Patient states for the last 2-3 weeks she has been having progressively worsening shortness of breath. She has never had anything like this happen to her before. She states she noticed her breathing trouble started when the [place] were at their peak in late . Additionally, patient states used to smoke 1/2 pack/day, but at that time when she started to notice her shortness of breath, she decided to quit smoking as well. Additionally, she states that she has had bilateral leg swelling, which is new for her as well. No history of blood clots. No history of heart failure or MIs. Additionally, patient was found to be in atrial fibrillation with RVR in the ED. Patient has never had atrial fibrillation in the past.  Not on any blood thinners or antiarrhythmics. Patient denies chest pain, nausea, vomiting, GI/ symptoms, edema, fever, or chills. Former smoker. Quit 4 weeks ago. Used to smoke 1 pack/day for 50+ years. \"   - LE dropplers negative for DVT, RR for dyspnea and transfer to CVU    Past Medical History:  has no past medical history on file. Past Surgical History:  has no past surgical history on file. Discharge Recommendations: Kyle Vigil scored a 16/24 on the AM-PAC ADL Inpatient form. Current research shows that an AM-PAC score of 17 or less is typically not associated with a discharge to the patient's home setting.  Based on the patient's AM-PAC score and their current ADL deficits, it is recommended that the patient have 3-5 sessions per week of Occupational Therapy at d/c to increase the patient's independence. Please see assessment section for further patient specific details. If patient discharges prior to next session this note will serve as a discharge summary. Please see below for the latest assessment towards goals. DME Required For Discharge: DME to be determined pending patient progress    Precautions/Restrictions: high fall risk, NPO  Weight Bearing Restrictions: no restrictions  [] Right Upper Extremity  [] Left Upper Extremity [] Right Lower Extremity  [] Left Lower Extremity     Required Braces/Orthotics: no braces required   [] Right  [] Left  Positional Restrictions:no positional restrictions    Pre-Admission Information   Lives With: alone                     Type of Home: house  Home Layout: one level  Home Access:  1 step to enter without rails   Bathroom Layout: tub/shower unit (takes bath usually)  Bathroom Equipment:  None  Toilet Height: standard height - able to push up from sink  Home Equipment: single point cane  Transfer Assistance: Independent without use of device  Ambulation Assistance:Independent without use of device - has been using her cane over the last 3 weeks as her breathing got worse   ADL Assistance: independent with all ADL's  IADL Assistance: independent with homemaking tasks  Active :        [] Yes                 [x] No - does not drive due to vision, sister and nephew drive  Hand Dominance: [] Left                 [x] Right  Current Employment: retired.   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   Pain: 0/10  Pain Interventions:

## 2023-07-31 NOTE — PROGRESS NOTES
Nutrition Note    RECOMMENDATIONS  PO Diet: Continue current diet  ONS: Decreased Ensure to BID    NUTRITION ASSESSMENT   Pt triggered for follow-up. On cardiac restricted diet with documented meal intakes of % throughout admission; receiving Ensure TID. Upon visiting, reported good appetite and po intake; observed all of breakfast consumed. Drinking Ensure BID. RD will decrease frequency of ONS and monitor for continued adequate po intake. Nutrition Related Findings: Mg 1.60 and replaced. LBM 7/30, BS+. +2 pitting BLE edema. Wounds: None  Nutrition Education:  Education not indicated   Nutrition Goals: by next RD assessment, PO intake 75% or greater     MALNUTRITION ASSESSMENT   Chronic Illness  Malnutrition Status: At risk for malnutrition (Comment)    NUTRITION DIAGNOSIS   No nutrition diagnosis at this time     CURRENT NUTRITION THERAPIES  ADULT DIET; Regular; Low Fat/Low Chol/High Fiber/GARY  ADULT ORAL NUTRITION SUPPLEMENT; Breakfast, Lunch, Dinner; Standard High Calorie/High Protein Oral Supplement     PO Intake: %   PO Supplement Intake:Unable to assess (but accepting)    ANTHROPOMETRICS  Current Height: 5' 3\" (160 cm)  Current Weight - Scale: 129 lb 6.6 oz (58.7 kg)    Admission weight: 131 lb (59.4 kg)  Ideal Body Weight (IBW): 115 lbs  (52 kg)    Usual Bodyweight 135 lb (61.2 kg)       BMI: 22.9    COMPARATIVE STANDARDS  Total Energy Requirements (kcals/day): 3380-8599     Protein (g):         Fluid (mL/day):  5903-7835    The patient will be monitored per nutrition standards of care. Consult dietitian if additional nutrition interventions are needed prior to RD reassessment.      Graham Mejia, MS, RD, LD    Contact: [phone]

## 2023-07-31 NOTE — CONSULTS
monitoring for toxicity     Advanced Care Planning Note. Purpose of Encounter: Advanced care planning   Parties In Attendance: Patient   Decisional Capacity: Limited   Subjective: Patient is weak and tired  Objective: thoracentesis   Goals of Care Determination: Patient wants full support (CPR, vent, surgery, HD, trach, PEG)  Plan:  Palliative care consult   The Patient has the following current code status:    Code Status: Full Code  Time spent on Advanced care Plannin minutes  Advanced Care Planning Documents: No advanced directives on chart, unknown is the POA.                    Signed By: Electronically signed by BANDAR Echavarria CNP on 2023 at 9:21 AM  Palliative Medicine     2023

## 2023-07-31 NOTE — PROGRESS NOTES
status she was transferred to ICU. Placed on NIPPV and Lasix drip. She remains in Afib with rates less than 130. She was hypotensive overnight with a order for Levophed drip if needed. She is set to have thoracentesis this morning. She was seen and examined. Clinical notes reviewed. Telemetry reviewed. Denies having chest pain, palpitations, cough, or dizziness at the time of this visit. Assessment and Plan:     New Onset Atrial Fibrillation  - Duration unknown, has had symptoms since June. - likely secondary to hypoxia, CHF exacerbation  - no previous documentation of Atrial Fibrillation  - contributing factor likely pleural and pericardial effusion  - continue Metoprolol and Digoxin   - increase Cardizem to 90 mg BID  - ECHO EF-45%, severe MR, severe TR with high RSVP  - d/c'd AC on 7/29 per Dr Louie Oliveros due to increased pericardial effusion   - NPO after midnight for potential PEREZ/Cardioversion in morning with Dr Darren Wang     Discussed cardioversion with Dr Darren Wang, will plan on tomorrow if anesthesia available. Not a good candidate for long term amiodarone due to COPD and smoking history, but may but useful in the acute setting.      - Troponin 20,19   - TSH - 1.79     [id number] Score for Atrial Fibrillation Stroke Risk    Risk   Factors   Component Value   C CHF No 0   H HTN No 0   A2 Age >= 76 Yes,  (78 y.o.) 2   D DM No 0   S2 Prior Stroke/TIA No 0   V Vascular Disease No 0   A Age 77-78 No,  (78 y.o.) 0   Sc Sex female 1     [id number]  Score   3   Score last updated 7/26/23 12:16 PM EDT     Acute Hypoxia Respiratory Failure  - Rt pleural effusion drained 750cc 7/28/2023  - Pulmonology following  - ECHO- EF 45%  - no PFT's on file   - negative for PE and DVTs  - improving, lower oxygen needs, 4L NC this am     3.  Pericardial Effusion  - Etiology?   - repeat limited ECHO today for evaluation showed improvement   - EF 45%  - Pro BNP- 1160  - continue Lasix   - I/Os  - Weight daily  - close edema ([address]) 07/26/2023    Pulmonary emphysema ([address]) 07/26/2023    Atrial fibrillation with RVR ([address]) 07/25/2023    Acute respiratory failure with hypoxia ([address]) 07/25/2023    SIRS (systemic inflammatory response syndrome) ([address]) 07/25/2023    Pleural effusion 07/25/2023    Pericardial effusion 07/25/2023        Thank you for allowing to us to participate in the care of Bryan .     BANDAR Godwin-CNP  [facility]   Office: [phone]

## 2023-07-31 NOTE — PROGRESS NOTES
V2.0    [facility] [place] Progress Note      Name:  Pepito Perez /Age/Sex: 1945  (69 y.o. female)   MRN & CSN:  [id number] & [id number] Encounter Date/Time: 2023 9:45 AM EDT   Location:  [place] [address]54 PCP: No primary care provider on file. Attending:Rashaad Simms MD       Hospital Day: 7    Assessment and Recommendations   Pepito Perez is a 69 y.o. female with pmh of  who presents with Acute respiratory failure with hypoxia ([address])      Plan:   Acute hypoxic respiratory failure  -Mickie secondary A-fib RVR CHF and also pleural effusion  -Status post thoracocentesis fluid analysis noted so far no culture growth likely transudative  -Patient was on Lasix drip due to IV access with transition to twice daily dosing for now  -Await cardiology recommendation  -Newly on 6 L nasal cannula continue to wean down    Hypokalemia secondary to above improving    A-fib with RVR  =-Still not controlled on Cardizem drip  -Continue to monitor titrate EP cardiology following as well  -Lovenox for anticoagulation for now  Replace magnesium 1.4      CHF acute on chronic with severe pulmonary hypertension  Improving slowly        Diet ADULT DIET; Regular; Low Fat/Low Chol/High Fiber/GARY  ADULT ORAL NUTRITION SUPPLEMENT; Breakfast, Dinner; Standard High Calorie/High Protein Oral Supplement   DVT Prophylaxis [] Lovenox, []  Heparin, [] SCDs, [] Ambulation,  [] Eliquis, [] Xarelto  [] Coumadin   Code Status Full Code   Disposition   Patient requires continued admission due to    Surrogate Decision Maker/ POA       Personally reviewed Lab Studies and Imaging         Subjective:     Chief Complaint:     Pepito Perez is a 69 y.o. female who presents with shortness of breath improving still on 6 L nasal cannula      Review of Systems:      Pertinent positives and negatives discussed in HPI    Objective:      Intake/Output Summary (Last 24 hours) at 2023 0941  Last data filed at 2023 0703  Gross per 24 hour   Intake !Yes            !None      ! +------------------------+----------+---------------+----------+ ! PTV                     ! Yes       ! Yes            ! None      ! +------------------------+----------+---------------+----------+ ! Peroneal                !Yes       ! Yes            ! None      ! +------------------------+----------+---------------+----------+ ! GSV Calf                ! Yes       ! Yes            ! None      ! +------------------------+----------+---------------+----------+ ! SSV                     ! Yes       ! Yes            ! None      ! +------------------------+----------+---------------+----------+ Left Doppler Measurements +------------------------+-----------+------+------------+ ! Location                ! Signal     !Reflux! Reflux (sec)! +------------------------+-----------+------+------------+ ! Sapheno Femoral Junction! Pulsatile  ! No    !            ! +------------------------+-----------+------+------------+ ! Common Femoral          !Pulsatile  ! No    !            ! +------------------------+-----------+------+------------+ ! Prox Femoral            !Spontaneous! No    !            ! +------------------------+-----------+------+------------+ ! Deep Femoral            !Spontaneous! No    !            ! +------------------------+-----------+------+------------+ ! Popliteal               !Spontaneous! No    !            ! +------------------------+-----------+------+------------+    IR GUIDED THORACENTESIS PLEURAL    Result Date: 7/27/2023  PROCEDURE: ULTRASOUND GUIDED THORACENTESIS 7/27/2023 HISTORY: ORDERING SYSTEM PROVIDED HISTORY: Right pleural effusion TECHNOLOGIST PROVIDED HISTORY: Reason for exam:->Right pleural effusion Which side should the procedure be performed?->Right PHYSICIANS: Tracy Rivera M.D. TECHNIQUE: Informed consent was obtained following a detailed explanation of the procedure including risks, benefits, and alternatives. Universal protocol was performed.  The right chest was than

## 2023-08-01 ENCOUNTER — APPOINTMENT (OUTPATIENT)
Dept: CARDIAC CATH/INVASIVE PROCEDURES | Age: 78
DRG: 291 | End: 2023-08-01
Payer: MEDICARE

## 2023-08-01 ENCOUNTER — ANESTHESIA EVENT (OUTPATIENT)
Dept: CARDIAC CATH/INVASIVE PROCEDURES | Age: 78
End: 2023-08-01
Payer: MEDICARE

## 2023-08-01 ENCOUNTER — ANESTHESIA (OUTPATIENT)
Dept: CARDIAC CATH/INVASIVE PROCEDURES | Age: 78
End: 2023-08-01
Payer: MEDICARE

## 2023-08-01 LAB
EKG ATRIAL RATE: 65 BPM
EKG DIAGNOSIS: NORMAL
EKG P AXIS: 56 DEGREES
EKG P-R INTERVAL: 166 MS
EKG Q-T INTERVAL: 254 MS
EKG QRS DURATION: 86 MS
EKG QTC CALCULATION (BAZETT): 264 MS
EKG R AXIS: -1 DEGREES
EKG T AXIS: 181 DEGREES
EKG VENTRICULAR RATE: 65 BPM

## 2023-08-01 PROCEDURE — 2580000003 HC RX 258: Performed by: STUDENT IN AN ORGANIZED HEALTH CARE EDUCATION/TRAINING PROGRAM

## 2023-08-01 PROCEDURE — 6360000002 HC RX W HCPCS: Performed by: NURSE ANESTHETIST, CERTIFIED REGISTERED

## 2023-08-01 PROCEDURE — 93010 ELECTROCARDIOGRAM REPORT: CPT | Performed by: INTERNAL MEDICINE

## 2023-08-01 PROCEDURE — 3700000000 HC ANESTHESIA ATTENDED CARE

## 2023-08-01 PROCEDURE — 97535 SELF CARE MNGMENT TRAINING: CPT

## 2023-08-01 PROCEDURE — 2000000000 HC ICU R&B

## 2023-08-01 PROCEDURE — 94761 N-INVAS EAR/PLS OXIMETRY MLT: CPT

## 2023-08-01 PROCEDURE — 7100000010 HC PHASE II RECOVERY - FIRST 15 MIN

## 2023-08-01 PROCEDURE — 3700000001 HC ADD 15 MINUTES (ANESTHESIA)

## 2023-08-01 PROCEDURE — 6370000000 HC RX 637 (ALT 250 FOR IP): Performed by: INTERNAL MEDICINE

## 2023-08-01 PROCEDURE — 93325 DOPPLER ECHO COLOR FLOW MAPG: CPT

## 2023-08-01 PROCEDURE — 2580000003 HC RX 258: Performed by: NURSE ANESTHETIST, CERTIFIED REGISTERED

## 2023-08-01 PROCEDURE — 93320 DOPPLER ECHO COMPLETE: CPT

## 2023-08-01 PROCEDURE — 6370000000 HC RX 637 (ALT 250 FOR IP): Performed by: HOSPITALIST

## 2023-08-01 PROCEDURE — 2700000000 HC OXYGEN THERAPY PER DAY

## 2023-08-01 PROCEDURE — 6370000000 HC RX 637 (ALT 250 FOR IP): Performed by: NURSE PRACTITIONER

## 2023-08-01 PROCEDURE — 93005 ELECTROCARDIOGRAM TRACING: CPT | Performed by: INTERNAL MEDICINE

## 2023-08-01 PROCEDURE — 97530 THERAPEUTIC ACTIVITIES: CPT

## 2023-08-01 PROCEDURE — 93312 ECHO TRANSESOPHAGEAL: CPT

## 2023-08-01 PROCEDURE — 2500000003 HC RX 250 WO HCPCS: Performed by: NURSE ANESTHETIST, CERTIFIED REGISTERED

## 2023-08-01 PROCEDURE — 6360000002 HC RX W HCPCS: Performed by: HOSPITALIST

## 2023-08-01 PROCEDURE — B24BZZ4 ULTRASONOGRAPHY OF HEART WITH AORTA, TRANSESOPHAGEAL: ICD-10-PCS | Performed by: INTERNAL MEDICINE

## 2023-08-01 PROCEDURE — 94640 AIRWAY INHALATION TREATMENT: CPT

## 2023-08-01 PROCEDURE — 5A2204Z RESTORATION OF CARDIAC RHYTHM, SINGLE: ICD-10-PCS | Performed by: INTERNAL MEDICINE

## 2023-08-01 RX ORDER — SODIUM CHLORIDE 0.9 % (FLUSH) 0.9 %
5-40 SYRINGE (ML) INJECTION PRN
Status: DISCONTINUED | OUTPATIENT
Start: 2023-08-01 | End: 2023-08-04 | Stop reason: HOSPADM

## 2023-08-01 RX ORDER — AMIODARONE HYDROCHLORIDE 200 MG/1
200 TABLET ORAL 2 TIMES DAILY
Status: DISCONTINUED | OUTPATIENT
Start: 2023-08-01 | End: 2023-08-04 | Stop reason: HOSPADM

## 2023-08-01 RX ORDER — LIDOCAINE HYDROCHLORIDE 20 MG/ML
INJECTION, SOLUTION INFILTRATION; PERINEURAL PRN
Status: DISCONTINUED | OUTPATIENT
Start: 2023-08-01 | End: 2023-08-01 | Stop reason: SDUPTHER

## 2023-08-01 RX ORDER — PROPOFOL 10 MG/ML
INJECTION, EMULSION INTRAVENOUS PRN
Status: DISCONTINUED | OUTPATIENT
Start: 2023-08-01 | End: 2023-08-01 | Stop reason: SDUPTHER

## 2023-08-01 RX ORDER — SODIUM CHLORIDE 9 MG/ML
INJECTION, SOLUTION INTRAVENOUS CONTINUOUS PRN
Status: DISCONTINUED | OUTPATIENT
Start: 2023-08-01 | End: 2023-08-01 | Stop reason: SDUPTHER

## 2023-08-01 RX ORDER — GLYCOPYRROLATE 0.2 MG/ML
INJECTION INTRAMUSCULAR; INTRAVENOUS PRN
Status: DISCONTINUED | OUTPATIENT
Start: 2023-08-01 | End: 2023-08-01 | Stop reason: SDUPTHER

## 2023-08-01 RX ORDER — SODIUM CHLORIDE 0.9 % (FLUSH) 0.9 %
5-40 SYRINGE (ML) INJECTION EVERY 12 HOURS SCHEDULED
Status: DISCONTINUED | OUTPATIENT
Start: 2023-08-01 | End: 2023-08-04 | Stop reason: HOSPADM

## 2023-08-01 RX ORDER — SODIUM CHLORIDE 9 MG/ML
INJECTION, SOLUTION INTRAVENOUS PRN
Status: DISCONTINUED | OUTPATIENT
Start: 2023-08-01 | End: 2023-08-04 | Stop reason: HOSPADM

## 2023-08-01 RX ADMIN — LIDOCAINE HYDROCHLORIDE 40 MG: 20 INJECTION, SOLUTION INFILTRATION; PERINEURAL at 11:53

## 2023-08-01 RX ADMIN — Medication 10 ML: at 09:38

## 2023-08-01 RX ADMIN — DILTIAZEM HYDROCHLORIDE 90 MG: 90 CAPSULE, EXTENDED RELEASE ORAL at 09:35

## 2023-08-01 RX ADMIN — IPRATROPIUM BROMIDE AND ALBUTEROL SULFATE 1 DOSE: 2.5; .5 SOLUTION RESPIRATORY (INHALATION) at 20:20

## 2023-08-01 RX ADMIN — METOPROLOL TARTRATE 50 MG: 50 TABLET ORAL at 19:55

## 2023-08-01 RX ADMIN — PROPOFOL 10 MG: 10 INJECTION, EMULSION INTRAVENOUS at 12:01

## 2023-08-01 RX ADMIN — FUROSEMIDE 40 MG: 10 INJECTION, SOLUTION INTRAMUSCULAR; INTRAVENOUS at 17:27

## 2023-08-01 RX ADMIN — GLYCOPYRROLATE 0.1 MG: 0.2 INJECTION, SOLUTION INTRAMUSCULAR; INTRAVENOUS at 11:43

## 2023-08-01 RX ADMIN — PROPOFOL 20 MG: 10 INJECTION, EMULSION INTRAVENOUS at 11:53

## 2023-08-01 RX ADMIN — FUROSEMIDE 40 MG: 10 INJECTION, SOLUTION INTRAMUSCULAR; INTRAVENOUS at 09:35

## 2023-08-01 RX ADMIN — PHENYLEPHRINE HYDROCHLORIDE 100 MCG: 10 INJECTION INTRAVENOUS at 11:56

## 2023-08-01 RX ADMIN — PHENYLEPHRINE HYDROCHLORIDE 100 MCG: 10 INJECTION INTRAVENOUS at 12:01

## 2023-08-01 RX ADMIN — SODIUM CHLORIDE: 9 INJECTION, SOLUTION INTRAVENOUS at 11:40

## 2023-08-01 RX ADMIN — APIXABAN 5 MG: 5 TABLET, FILM COATED ORAL at 19:55

## 2023-08-01 RX ADMIN — Medication 10 ML: at 19:55

## 2023-08-01 RX ADMIN — IPRATROPIUM BROMIDE AND ALBUTEROL SULFATE 1 DOSE: 2.5; .5 SOLUTION RESPIRATORY (INHALATION) at 07:40

## 2023-08-01 RX ADMIN — PHENYLEPHRINE HYDROCHLORIDE 100 MCG: 10 INJECTION INTRAVENOUS at 12:06

## 2023-08-01 RX ADMIN — AMIODARONE HYDROCHLORIDE 200 MG: 200 TABLET ORAL at 19:55

## 2023-08-01 RX ADMIN — DIGOXIN 250 MCG: 125 TABLET ORAL at 09:35

## 2023-08-01 RX ADMIN — PROPOFOL 30 MG: 10 INJECTION, EMULSION INTRAVENOUS at 11:57

## 2023-08-01 RX ADMIN — METOPROLOL TARTRATE 50 MG: 50 TABLET ORAL at 09:35

## 2023-08-01 ASSESSMENT — ENCOUNTER SYMPTOMS: SHORTNESS OF BREATH: 0

## 2023-08-01 ASSESSMENT — PAIN SCALES - GENERAL: PAINLEVEL_OUTOF10: 0

## 2023-08-01 NOTE — PROGRESS NOTES
V2.0    [facility] [place]      Name:  Eliana Bolton /Age/Sex: 1945  (69 y.o. female)   MRN & CSN:  [id number] & [id number] Encounter Date/Time: 2023 9:45 AM EDT   Location:  [place] [id number] PCP: No primary care provider on file. Tony Anne MD       Hospital Day: 8    Assessment and Recommendations   Eliana Bolton is a 69 y.o. female with pmh of  who presents with Acute respiratory failure with hypoxia ([address])      Plan:   Acute hypoxic respiratory failure  -Mickie secondary A-fib RVR CHF and also pleural effusion  -Status post thoracocentesis fluid analysis noted so far no culture growth likely transudative  -impriving    Contniue iv lasix  o wean down    Pericardial effusion  Unclear etiology. Greater repeat echo showing improvement. Management per cardiology looks like no surgical intervention planned  We will follow cardiology recommendation    COPD continue breathing treatment appreciate pulmonary recommendation has signed off pulmonary    Acute on chronic CHF exacerbation  Improving continue diuresis    Hypokalemia secondary to above improving    A-fib with RVR  =-Still not controlled on Cardizem drip  -Continue to monitor titrate EP cardiology following as well  -Lovenox for anticoagulation for now  Replace magnesium 1.4      CHF acute on chronic with severe pulmonary hypertension  Improving slowly        Diet Diet NPO Exceptions are: Sips of Water with Meds   DVT Prophylaxis [] Lovenox, []  Heparin, [] SCDs, [] Ambulation,  [] Eliquis, [] Xarelto  [] Coumadin   Code Status Full Code   Disposition   Patient requires continued admission due to ongoing issues.   1 to 2 days pending cardiac clearance   Surrogate Decision Maker/ POA       Personally reviewed Lab Studies and Imaging         Subjective:     Chief Complaint:     Eliana Bolton is a 69 y.o. female who presents with shortness of breath improving sta      Review of Systems:      Pertinent positives and negatives

## 2023-08-01 NOTE — ANESTHESIA POSTPROCEDURE EVALUATION
Department of Anesthesiology  Postprocedure Note    Patient: Nayan Iqbal  MRN: [id number]  YOB: 1945  Date of evaluation: 8/1/2023      Procedure Summary     Date: 08/01/23 Room / Location: [facility] Cath Lab    Anesthesia Start: 1140 Anesthesia Stop: 3509    Procedure: ECHO PEREZ IN CARDIAC CATH Diagnosis:     Scheduled Providers:  Responsible Provider: Jennifer Schuster MD    Anesthesia Type: MAC ASA Status: 4          Anesthesia Type: No value filed.     Kat Phase I:      Kat Phase II:        Anesthesia Post Evaluation    Patient location during evaluation: PACU  Patient participation: complete - patient participated  Level of consciousness: awake and alert  Airway patency: patent  Nausea & Vomiting: no nausea and no vomiting  Complications: no  Cardiovascular status: hemodynamically stable  Respiratory status: acceptable  Hydration status: stable  Multimodal analgesia pain management approach  Pain management: adequate

## 2023-08-01 NOTE — PROCEDURES
[facility]     Electrophysiology Procedure Note       Date of Procedure: 8/1/2023  Patient's Name: Yony Camilo  YOB: 1945   Medical Record Number: [id number]  Procedure Performed by: Elba Thornton MD    Procedures performed:      Trans-esophageal echocardiography  External Electrical cardioversion   Mallampati3  ASA 3  Anesthesia service provided sedation  Indication of the procedure:  atrial fibrillation   Details of procedure: The patient was brought to the cath lab area in a fasting and non-sedated state. The risks, benefits and alternatives of the procedure were discussed with the patient. The patient opted to proceed with the procedure. Written informed consent was signed and placed in the chart. A timeout protocol was completed to identify the patient and the procedure being performed. IV sedation was provided by anesthesia and PEREZ was performed which did not show any KEISHA/LA clot/thrombus. Full PEREZ reports will be dictated. Patient is on chronic anticoagulation therapy. Then we used Brevital for sedation and electrical DC cardioversion was perfomred using 200J, synchronized shock. Patient was converted to sinus rhythm at 72 bpm. The patient tolerated the procedure well and there were no complications. Conclusion:   Successful external DC cardioversion of atrial fibrillation   Plan: Will continue with medical therapy.      Resume anticoagulation

## 2023-08-01 NOTE — PROGRESS NOTES
Physical Therapy Attempt  Alix Ash    PT attempted follow-up treatment. Pt ALECIA for procedure at this time. Will follow-up as pt status and schedule allow. No charge.    Brad Rivera, PT, DPT #[id number]

## 2023-08-01 NOTE — PLAN OF CARE
Problem: Discharge Planning  Goal: Discharge to home or other facility with appropriate resources  Outcome: Progressing     Problem: Safety - Adult  Goal: Free from fall injury  Outcome: Progressing     Problem: Cardiovascular - Adult  Goal: Maintains optimal cardiac output and hemodynamic stability  Outcome: Progressing  Goal: Absence of cardiac dysrhythmias or at baseline  Outcome: Progressing     Problem: Musculoskeletal - Adult  Goal: Return mobility to safest level of function  Outcome: Progressing     Problem: Respiratory - Adult  Goal: Achieves optimal ventilation and oxygenation  Outcome: Progressing     Problem: Nutrition Deficit:  Goal: Optimize nutritional status  Outcome: Progressing     Problem: Pain  Goal: Verbalizes/displays adequate comfort level or baseline comfort level  Outcome: Progressing     Problem: Chronic Conditions and Co-morbidities  Goal: Patient's chronic conditions and co-morbidity symptoms are monitored and maintained or improved  Outcome: Progressing

## 2023-08-01 NOTE — PROGRESS NOTES
[facility]   Electrophysiology Progress Note     Date: 8/1/2023  Admit Date: 7/25/2023     Reason for consultation: Atrial Fibrillation with RVR    Chief Complaint:   Chief Complaint   Patient presents with    Shortness of Breath     SOB for approx 3 week & swelling to right leg for approx 3 days. Denies cardiac/pulm hx. Noted afib RVR in triage. History of Present Illness: History obtained from patient and medical record. Alyssa Park is a 69 y.o. female with a history of tobacco dependence. She does not follow with a primary care physician. She denies any history of HTN, CAD, COPD, LEILANI, DM, MI or palpitations. She does not drink alcohol. She reports she started to notice shortness of breath a month ago when the air quality was poor due to the wildif[place]. She did not notice any fast heart rate or palpitations at that time. She has smoked for over 40 years and quit 2 weeks ago. She developed bilateral lower extremity edema which was worsening over the last month. She presented to the ED last night with increasing shortness of breath at rest and feeling her \"heart racing'. Interval Hx: Today, she reports she is feeling good. States breathing is comfortable. She is asking about time of PEREZ/Cardioversion as she is an add on case with anesthesia. She denies any chest pain or discomfort, dizziness or palpitations. She is asking for ice chips. ADDENDUM: 8/1/2023 1700- She was cardioverted to sinus rhythm this afternoon. Called that she had brief episode of afib w/ RVR, spontaneously converted. She was asymptomatic with the brief episode sitting up in the chair eating. Dr Cuellar Scales notified and medication changes were made- d/c Digoxin, and Cardizem. Start Amiodarone and Eliquis. Overnight her heart rates were 150-160, she was restarted on Cardizem gtt and had rates in the 60's. The drip was turned off early this morning. She converted into from afib to aflutter overnight. 153 2023 02:45 PM    PROT 7.2 2023 02:45 PM    AGRATIO 1.2 2023 02:45 PM    BILITOT 1.0 2023 02:45 PM     Cardiac Enzymes: No results found for: CKTOTAL, CKMB, CKMBINDEX, TROPONINI  Coags:   Lab Results   Component Value Date/Time    PROTIME 14.5 2023 02:45 PM    INR 1.13 2023 02:45 PM       EC2023  - Atrial fibrillation with RVR  Rate- 188 QRS- 70 QTc - 417    Limited ECHO: 2023  Summary   This was a limited study for pericardial effusion. Technically difficult study due to rapid heart rate and abnormal arrhythmia. Left ventricular cavity size is normal with normal left ventricular wall   thickness. Ejection fraction is visually estimated to be 55%. There is a moderate circumferential pericardial effusion noted. Improved   compared to prior study       Limited ECHO: 2023   Summary   Mildly reduced global ejection fraction estimated at 45%. There is moderate circumferential pericardial effusion noted without RV   diastolic collapse   Severe right atrial enlargement based on visual inspection. Pericardial effusion unchanged from prior study     ECHO:  2023   Summary   -Mildly reduced global systolic function with an ejection fraction estimated at 45%. -Global hypokinesis noted.   -Aortic valve appears sclerotic but opens adequately. No significant   regurgitation.   -Thickened mitral valve without evidence of stenosis. There is severe mitral regurgitation.   -There is severe tricuspid regurgitation with a RVSP estimation of 56 mmHg. This is suggestive of moderate pulmonary hypertension.   -Mild pulmonic regurgitation present.   -Moderate circumferential pericardial effusion noted without evidence of right ventricular collapse. -IVC size is dilated (>2.1 cm) but collapses > 50% with respiration   consistent with elevated RA pressure (8 mmHg).    -Diastolic dysfunction is present however indeterminate in the presence of  atrial

## 2023-08-01 NOTE — PROGRESS NOTES
[facility]   Phone: [phone]    Occupational Therapy    [] Initial Evaluation            [x] Daily Treatment Note         [] Discharge Summary      Patient: Eliana Bolton   : 1945   MRN: [id number]   Date of Service:  2023    Admitting Diagnosis:  Acute respiratory failure with hypoxia [facility])  Current Admission Summary: 69 y.o. female with no significant past no history who presented to [facility] with complaints of shortness of breath. Patient states for the last 2-3 weeks she has been having progressively worsening shortness of breath. She has never had anything like this happen to her before. She states she noticed her breathing trouble started when the [place] were at their peak in late . Additionally, patient states used to smoke 1/2 pack/day, but at that time when she started to notice her shortness of breath, she decided to quit smoking as well. Additionally, she states that she has had bilateral leg swelling, which is new for her as well. No history of blood clots. No history of heart failure or MIs. Additionally, patient was found to be in atrial fibrillation with RVR in the ED. Patient has never had atrial fibrillation in the past.  Not on any blood thinners or antiarrhythmics. Patient denies chest pain, nausea, vomiting, GI/ symptoms, edema, fever, or chills. Former smoker. Quit 4 weeks ago. Used to smoke 1 pack/day for 50+ years. \"   - LE dropplers negative for DVT, RR for dyspnea and transfer to CVU    Past Medical History:  has no past medical history on file. Past Surgical History:  has no past surgical history on file. Discharge Recommendations: Eliana Bolton scored a 16 on the AM-PAC ADL Inpatient form. Current research shows that an AM-PAC score of 17 or less is typically not associated with a discharge to the patient's home setting.  Based on the patient's AM-PAC score and their current Time In 0900     Time Out 0955     Minutes 55           Timed Code Treatment Minutes: 55 Minutes  Total Treatment Minutes:  55       Electronically Signed By: CHICHI Stinson OTR/MICHELLE [id number]    CHICHI Stinson OTALYSSA/MICHELLE [id number]

## 2023-08-02 ENCOUNTER — APPOINTMENT (OUTPATIENT)
Dept: GENERAL RADIOLOGY | Age: 78
DRG: 291 | End: 2023-08-02
Payer: MEDICARE

## 2023-08-02 LAB
ANION GAP SERPL CALCULATED.3IONS-SCNC: 6 MMOL/L (ref 3–16)
BUN SERPL-MCNC: 14 MG/DL (ref 7–20)
CALCIUM SERPL-MCNC: 8.6 MG/DL (ref 8.3–10.6)
CHLORIDE SERPL-SCNC: 94 MMOL/L (ref 99–110)
CO2 SERPL-SCNC: 37 MMOL/L (ref 21–32)
CREAT SERPL-MCNC: <0.5 MG/DL (ref 0.6–1.2)
DEPRECATED RDW RBC AUTO: 14.3 % (ref 12.4–15.4)
GFR SERPLBLD CREATININE-BSD FMLA CKD-EPI: >60 ML/MIN/{1.73_M2}
GLUCOSE SERPL-MCNC: 77 MG/DL (ref 70–99)
HCT VFR BLD AUTO: 45.5 % (ref 36–48)
HGB BLD-MCNC: 14.6 G/DL (ref 12–16)
MAGNESIUM SERPL-MCNC: 1.5 MG/DL (ref 1.8–2.4)
MCH RBC QN AUTO: 31.7 PG (ref 26–34)
MCHC RBC AUTO-ENTMCNC: 32.1 G/DL (ref 31–36)
MCV RBC AUTO: 98.6 FL (ref 80–100)
PLATELET # BLD AUTO: 204 K/UL (ref 135–450)
PMV BLD AUTO: 7.5 FL (ref 5–10.5)
POTASSIUM SERPL-SCNC: 4.1 MMOL/L (ref 3.5–5.1)
RBC # BLD AUTO: 4.61 M/UL (ref 4–5.2)
SODIUM SERPL-SCNC: 137 MMOL/L (ref 136–145)
WBC # BLD AUTO: 9.7 K/UL (ref 4–11)

## 2023-08-02 PROCEDURE — 6370000000 HC RX 637 (ALT 250 FOR IP): Performed by: NURSE PRACTITIONER

## 2023-08-02 PROCEDURE — 83735 ASSAY OF MAGNESIUM: CPT

## 2023-08-02 PROCEDURE — 2700000000 HC OXYGEN THERAPY PER DAY

## 2023-08-02 PROCEDURE — 6360000002 HC RX W HCPCS: Performed by: HOSPITALIST

## 2023-08-02 PROCEDURE — 80048 BASIC METABOLIC PNL TOTAL CA: CPT

## 2023-08-02 PROCEDURE — 6360000002 HC RX W HCPCS: Performed by: STUDENT IN AN ORGANIZED HEALTH CARE EDUCATION/TRAINING PROGRAM

## 2023-08-02 PROCEDURE — 2580000003 HC RX 258: Performed by: NURSE PRACTITIONER

## 2023-08-02 PROCEDURE — 94761 N-INVAS EAR/PLS OXIMETRY MLT: CPT

## 2023-08-02 PROCEDURE — 85027 COMPLETE CBC AUTOMATED: CPT

## 2023-08-02 PROCEDURE — 94640 AIRWAY INHALATION TREATMENT: CPT

## 2023-08-02 PROCEDURE — 71045 X-RAY EXAM CHEST 1 VIEW: CPT

## 2023-08-02 PROCEDURE — 2580000003 HC RX 258: Performed by: STUDENT IN AN ORGANIZED HEALTH CARE EDUCATION/TRAINING PROGRAM

## 2023-08-02 PROCEDURE — 36415 COLL VENOUS BLD VENIPUNCTURE: CPT

## 2023-08-02 PROCEDURE — 2000000000 HC ICU R&B

## 2023-08-02 PROCEDURE — 6370000000 HC RX 637 (ALT 250 FOR IP): Performed by: HOSPITALIST

## 2023-08-02 RX ADMIN — FUROSEMIDE 40 MG: 10 INJECTION, SOLUTION INTRAMUSCULAR; INTRAVENOUS at 17:07

## 2023-08-02 RX ADMIN — AMIODARONE HYDROCHLORIDE 200 MG: 200 TABLET ORAL at 07:11

## 2023-08-02 RX ADMIN — APIXABAN 5 MG: 5 TABLET, FILM COATED ORAL at 07:11

## 2023-08-02 RX ADMIN — IPRATROPIUM BROMIDE AND ALBUTEROL SULFATE 1 DOSE: 2.5; .5 SOLUTION RESPIRATORY (INHALATION) at 20:14

## 2023-08-02 RX ADMIN — IPRATROPIUM BROMIDE AND ALBUTEROL SULFATE 1 DOSE: 2.5; .5 SOLUTION RESPIRATORY (INHALATION) at 09:47

## 2023-08-02 RX ADMIN — MAGNESIUM SULFATE HEPTAHYDRATE 2000 MG: 40 INJECTION, SOLUTION INTRAVENOUS at 12:28

## 2023-08-02 RX ADMIN — METOPROLOL TARTRATE 50 MG: 50 TABLET ORAL at 20:22

## 2023-08-02 RX ADMIN — METOPROLOL TARTRATE 50 MG: 50 TABLET ORAL at 07:11

## 2023-08-02 RX ADMIN — FUROSEMIDE 40 MG: 10 INJECTION, SOLUTION INTRAMUSCULAR; INTRAVENOUS at 07:11

## 2023-08-02 RX ADMIN — Medication 10 ML: at 07:13

## 2023-08-02 RX ADMIN — APIXABAN 5 MG: 5 TABLET, FILM COATED ORAL at 20:22

## 2023-08-02 RX ADMIN — SODIUM CHLORIDE, PRESERVATIVE FREE 10 ML: 5 INJECTION INTRAVENOUS at 07:13

## 2023-08-02 RX ADMIN — AMIODARONE HYDROCHLORIDE 200 MG: 200 TABLET ORAL at 20:22

## 2023-08-02 RX ADMIN — SODIUM CHLORIDE, PRESERVATIVE FREE 10 ML: 5 INJECTION INTRAVENOUS at 21:53

## 2023-08-02 NOTE — PROGRESS NOTES
Pharmacy to check patient copay for Eliquis    Copay for patient will be: $157/month    Pharmacy will continue to follow the decision on therapy and  the patient if appropriate.        Thelma Kraus, PharmD, [facility]  Clinical Pharmacist  [phone]

## 2023-08-02 NOTE — CARE COORDINATION
Pt needs updated PT notes. OT still recommending snf. CM met with pt this afternoon and discussed hc vs snf. Pt still not sure of choice. CM provided insurance facility and ratings list for pt to review. If patient discharges home it is with [facility] accepted. Patient states she lives alone and may go to sister's house in [facility]. If pt decides to d/c to different address CM will need to update [facility] of d/c plan and where pt will be.     Fabio Villasenor RN, BSN  [phone]

## 2023-08-02 NOTE — PROGRESS NOTES
V2.0    [facility]      Name:  Jackie Low /Age/Sex: 1945  (69 y.o. female)   MRN & CSN:  [id number] & [id number] Encounter Date/Time: 2023 9:45 AM EDT   Location:  [facility]1417/8571-54 PCP: No primary care provider on file. Attending:Rashaad Gomes MD       Hospital Day: 9    Assessment and Recommendations   Jackie Low is a 69 y.o. female with pmh of  who presents with Acute respiratory failure with hypoxia ([address])      Plan:   Acute hypoxic respiratory failure  -Lebeau secondary A-fib RVR CHF and also pleural effusion  -Status post thoracocentesis fluid analysis noted so far no culture growth likely transudative  -impriving    Contniue iv lasix  -We will schedule for placement status    Pericardial effusion  Unclear etiology. -Repeat echogram ordered. Further recommendation per cardiology on    COPD continue breathing treatment appreciate pulmonary recommendation has signed off pulmonary    Acute on chronic CHF exacerbation  Improving continue diuresis    Hypokalemia secondary to above improving    A-fib with RVR  -Patient underwent cardioversion. Which did not administer now this morning  Normal sinus rate is controlled patient cardiology recommendation continue to coagulation on Lovenox for now    CHF acute on chronic with severe pulmonary hypertension  Improving slowly        Diet ADULT DIET; Regular; Adult oral Nutrition Support   DVT Prophylaxis [] Lovenox, []  Heparin, [] SCDs, [] Ambulation,  [] Eliquis, [] Xarelto  [] Coumadin   Code Status Full Code   Disposition   Patient requires continued admission due to ongoing issues.   1 to 2 days pending cardiac clearance   Surrogate Decision Maker/ POA       Personally reviewed Lab Studies and Imaging         Subjective:     Chief Complaint:     Jackie Low is a 69 y.o. female who presents with shortness of breath improving sta patient underwent cardioversion yesterday which was unsuccessful now converted to normal

## 2023-08-02 NOTE — PROGRESS NOTES
[facility]   Electrophysiology Progress Note     Date: 8/2/2023  Admit Date: 7/25/2023     Reason for consultation: Atrial Fibrillation with RVR    Chief Complaint:   Chief Complaint   Patient presents with    Shortness of Breath     SOB for approx 3 week & swelling to right leg for approx 3 days. Denies cardiac/pulm hx. Noted afib RVR in triage. History of Present Illness: History obtained from patient and medical record. Guicho Mosquera is a 69 y.o. female with a history of tobacco dependence. She does not follow with a primary care physician. She denies any history of HTN, CAD, COPD, LEILANI, DM, MI or palpitations. She does not drink alcohol. She reports she started to notice shortness of breath a month ago when the air quality was poor due to the wildifires. She did not notice any fast heart rate or palpitations at that time. She has smoked for over 40 years and quit 2 weeks ago. She developed bilateral lower extremity edema which was worsening over the last month. She presented to the ED last night with increasing shortness of breath at rest and feeling her \"heart racing'. Interval Hx: Today, she is complaining she is hungry. She reports she is breathing a little more comfortably this morning. She had an uneventful night. She was cardioverted yesterday into sinus rhythm. She had a brief episode of atrial tachycardia yesterday evening and medication adjusted, dc'd Cardizem, Digoxin and started Amiodarone. She had no more tachycardic events overnight. She was seen and examined. Clinical notes reviewed. Telemetry reviewed. Denies having chest pain, palpitations, or dizziness at the time of this visit. Assessment and Plan:     New Onset Atrial Fibrillation/ Aflutter   - Duration unknown, has had symptoms since June.                - Sinus rhythm on tele   - likely secondary to hypoxia, CHF exacerbation  - no previous documentation of Atrial Fibrillation PTA  - PEREZ/Cardioversion ejection fraction estimated at 45%. There is moderate circumferential pericardial effusion noted without RV diastolic collapse   Severe right atrial enlargement based on visual inspection. Pericardial effusion unchanged from prior study     ECHO:  7/26/2023   Summary   -Mildly reduced global systolic function with an ejection fraction estimated at 45%. -Global hypokinesis noted.   -Aortic valve appears sclerotic but opens adequately. No significant   regurgitation.   -Thickened mitral valve without evidence of stenosis. There is severe mitral regurgitation.   -There is severe tricuspid regurgitation with a RVSP estimation of 56 mmHg. This is suggestive of moderate pulmonary hypertension.   -Mild pulmonic regurgitation present.   -Moderate circumferential pericardial effusion noted without evidence of right ventricular collapse. -IVC size is dilated (>2.1 cm) but collapses > 50% with respiration   consistent with elevated RA pressure (8 mmHg). -Diastolic dysfunction is present however indeterminate in the presence of  atrial fibrillation. Stress Test: none    Cath:none    CT Chest Pulmonary Embolism W Contrast: 7/25/2023  IMPRESSION:  1. No definite evidence for pulmonary emboli. 2. Large right pleural effusion with consolidation most pronounced in the  right lower lobe. 3. Moderate-sized pericardial effusion. 4. Extensive anasarca. 5. Old fracture of the sternum and appearance of chronic compressions of the  thoracic spine. Comparison with pain is recommended. CXR 7/28/2023    FINDINGS:  Bibasilar airspace opacities appear increased. The left costophrenic angle is now obscured. A small right pleural effusion is again noted. There is cardiomegaly. No vascular congestion is noted. IMPRESSION:  1. Progressive airspace opacities at the lung bases which could reflect  atelectasis or pneumonia. A small right pleural effusion is not  significantly changed.   The left costophrenic angle is now

## 2023-08-03 LAB
ANION GAP SERPL CALCULATED.3IONS-SCNC: 3 MMOL/L (ref 3–16)
BUN SERPL-MCNC: 17 MG/DL (ref 7–20)
CALCIUM SERPL-MCNC: 8.6 MG/DL (ref 8.3–10.6)
CHLORIDE SERPL-SCNC: 96 MMOL/L (ref 99–110)
CO2 SERPL-SCNC: 38 MMOL/L (ref 21–32)
CREAT SERPL-MCNC: <0.5 MG/DL (ref 0.6–1.2)
GFR SERPLBLD CREATININE-BSD FMLA CKD-EPI: >60 ML/MIN/{1.73_M2}
GLUCOSE SERPL-MCNC: 88 MG/DL (ref 70–99)
MAGNESIUM SERPL-MCNC: 1.8 MG/DL (ref 1.8–2.4)
POTASSIUM SERPL-SCNC: 4.4 MMOL/L (ref 3.5–5.1)
SODIUM SERPL-SCNC: 137 MMOL/L (ref 136–145)

## 2023-08-03 PROCEDURE — 80048 BASIC METABOLIC PNL TOTAL CA: CPT

## 2023-08-03 PROCEDURE — 2580000003 HC RX 258: Performed by: NURSE PRACTITIONER

## 2023-08-03 PROCEDURE — 97116 GAIT TRAINING THERAPY: CPT

## 2023-08-03 PROCEDURE — 83735 ASSAY OF MAGNESIUM: CPT

## 2023-08-03 PROCEDURE — 94761 N-INVAS EAR/PLS OXIMETRY MLT: CPT

## 2023-08-03 PROCEDURE — 2000000000 HC ICU R&B

## 2023-08-03 PROCEDURE — 6360000002 HC RX W HCPCS: Performed by: HOSPITALIST

## 2023-08-03 PROCEDURE — 94640 AIRWAY INHALATION TREATMENT: CPT

## 2023-08-03 PROCEDURE — 6370000000 HC RX 637 (ALT 250 FOR IP): Performed by: HOSPITALIST

## 2023-08-03 PROCEDURE — 97530 THERAPEUTIC ACTIVITIES: CPT

## 2023-08-03 PROCEDURE — 6370000000 HC RX 637 (ALT 250 FOR IP): Performed by: NURSE PRACTITIONER

## 2023-08-03 PROCEDURE — 2700000000 HC OXYGEN THERAPY PER DAY

## 2023-08-03 RX ADMIN — METOPROLOL TARTRATE 50 MG: 50 TABLET ORAL at 09:55

## 2023-08-03 RX ADMIN — FUROSEMIDE 40 MG: 10 INJECTION, SOLUTION INTRAMUSCULAR; INTRAVENOUS at 16:22

## 2023-08-03 RX ADMIN — APIXABAN 5 MG: 5 TABLET, FILM COATED ORAL at 21:00

## 2023-08-03 RX ADMIN — IPRATROPIUM BROMIDE AND ALBUTEROL SULFATE 1 DOSE: 2.5; .5 SOLUTION RESPIRATORY (INHALATION) at 20:18

## 2023-08-03 RX ADMIN — IPRATROPIUM BROMIDE AND ALBUTEROL SULFATE 1 DOSE: 2.5; .5 SOLUTION RESPIRATORY (INHALATION) at 08:41

## 2023-08-03 RX ADMIN — SODIUM CHLORIDE, PRESERVATIVE FREE 10 ML: 5 INJECTION INTRAVENOUS at 21:02

## 2023-08-03 RX ADMIN — METOPROLOL TARTRATE 50 MG: 50 TABLET ORAL at 21:00

## 2023-08-03 RX ADMIN — SODIUM CHLORIDE, PRESERVATIVE FREE 10 ML: 5 INJECTION INTRAVENOUS at 09:55

## 2023-08-03 RX ADMIN — APIXABAN 5 MG: 5 TABLET, FILM COATED ORAL at 09:55

## 2023-08-03 RX ADMIN — AMIODARONE HYDROCHLORIDE 200 MG: 200 TABLET ORAL at 09:55

## 2023-08-03 RX ADMIN — FUROSEMIDE 40 MG: 10 INJECTION, SOLUTION INTRAMUSCULAR; INTRAVENOUS at 09:55

## 2023-08-03 RX ADMIN — AMIODARONE HYDROCHLORIDE 200 MG: 200 TABLET ORAL at 21:00

## 2023-08-03 ASSESSMENT — PAIN SCALES - GENERAL: PAINLEVEL_OUTOF10: 0

## 2023-08-03 NOTE — PROGRESS NOTES
[address] [facility] Department   Phone: [phone]    Physical Therapy    [] Initial Evaluation            [x] Daily Treatment Note         [] Discharge Summary      Patient: Kenny Elder   : 1945   MRN: [id number]   Date of Service:  8/3/2023  Admitting Diagnosis: Acute respiratory failure with hypoxia [facility])  Current Admission Summary: Per H&P on  \"77 y.o. female with no significant past no history who presented to [facility] with complaints of shortness of breath. Patient states for the last 2-3 weeks she has been having progressively worsening shortness of breath. She has never had anything like this happen to her before. She states she noticed her breathing trouble started when the [place] were at their peak in late . Additionally, patient states used to smoke 1/2 pack/day, but at that time when she started to notice her shortness of breath, she decided to quit smoking as well. Additionally, she states that she has had bilateral leg swelling, which is new for her as well. No history of blood clots. No history of heart failure or MIs. Additionally, patient was found to be in atrial fibrillation with RVR in the ED. Patient has never had atrial fibrillation in the past.  Not on any blood thinners or antiarrhythmics. Patient denies chest pain, nausea, vomiting, GI/ symptoms, edema, fever, or chills. Former smoker. Quit 4 weeks ago. Used to smoke 1 pack/day for 50+ years. \"   - LE dropplers negative for DVT   - cardioversion     Past Medical History:  has no past medical history on file. Past Surgical History:  has no past surgical history on file. Discharge Recommendations: Kenny Elder scored a 17/24 on the AM-PAC short mobility form. Current research shows that an AM-PAC score of 17 or less is typically not associated with a discharge to the patient's home setting.  Based on the patient's AM-PAC score and their current

## 2023-08-03 NOTE — PROGRESS NOTES
!None      ! +------------------------+----------+---------------+----------+ ! Popliteal               !Yes       ! Yes            ! None      ! +------------------------+----------+---------------+----------+ ! GSV Below Knee          ! Yes       ! Yes            ! None      ! +------------------------+----------+---------------+----------+ ! Gastroc                 ! Yes       ! Yes            ! None      ! +------------------------+----------+---------------+----------+ ! Soleal                  !Yes       ! Yes            ! None      ! +------------------------+----------+---------------+----------+ ! PTV                     ! Yes       ! Yes            ! None      ! +------------------------+----------+---------------+----------+ ! Peroneal                !Yes       ! Yes            ! None      ! +------------------------+----------+---------------+----------+ ! GSV Calf                ! Yes       ! Yes            ! None      ! +------------------------+----------+---------------+----------+ ! SSV                     ! Yes       ! Yes            ! None      ! +------------------------+----------+---------------+----------+ Left Doppler Measurements +------------------------+-----------+------+------------+ ! Location                ! Signal     !Reflux! Reflux (sec)! +------------------------+-----------+------+------------+ ! Sapheno Femoral Junction! Pulsatile  ! No    !            ! +------------------------+-----------+------+------------+ ! Common Femoral          !Pulsatile  ! No    !            ! +------------------------+-----------+------+------------+ ! Prox Femoral            !Spontaneous! No    !            ! +------------------------+-----------+------+------------+ ! Deep Femoral            !Spontaneous! No    !            ! +------------------------+-----------+------+------------+ ! Popliteal               !Spontaneous! No    !            ! +------------------------+-----------+------+------------+    IR GUIDED THORACENTESIS 11:10 PM    BACTERIA Rare 07/25/2023 11:10 PM    CLARITYU Clear 07/25/2023 11:10 PM    SPECGRAV >=1.030 07/25/2023 11:10 PM    LEUKOCYTESUR Negative 07/25/2023 11:10 PM    UROBILINOGEN 1.0 07/25/2023 11:10 PM    BILIRUBINUR Negative 07/25/2023 11:10 PM    BLOODU SMALL 07/25/2023 11:10 PM    GLUCOSEU Negative 07/25/2023 11:10 PM    KETUA Negative 07/25/2023 11:10 PM     Urine Cultures: No results found for: LABURIN  Blood Cultures:   Lab Results   Component Value Date/Time    BC No Growth after 4 days of incubation. 07/25/2023 07:23 PM     Lab Results   Component Value Date/Time    BLOODCULT2 No Growth after 4 days of incubation.  07/25/2023 07:29 PM     Organism: No results found for: [facility]      Electronically signed by Hi Phoenix MD on 8/3/2023 at 3:08 PM

## 2023-08-03 NOTE — CARE COORDINATION
[facility] pre-cert request submitted via [place] Access Portal.    Requested Facility:  Home at [address] [place] [address] Date to SNF:  8/3/2023  [facility] #:  [id number]    Deana Welch RN, BSN  [phone]

## 2023-08-03 NOTE — PROGRESS NOTES
Eliquis Counseling Note    Yony Camilo was counseled on Eliquis for Afib. Indication, duration of therapy, and signs/symptoms related to bleeding were discussed. Notified patient of copay of $157/month if not going to facility, no concerns regarding cost.     Yony Camilo had opportunity to ask questions. No barriers to education were noted.     Rosina Casillas, PharmD, BCPS  Clinical Pharmacist  [id number]

## 2023-08-03 NOTE — CARE COORDINATION
CM returned call earlier to Pacov with [address] [place] [address] Street [phone] and she states [facility] does not have a bed at this time but can consider pt for Home at [facility] which is five minutes from Loc[facility]. Cm spoke to pt while on phone with Pacov and pt agreeable to [facility]. Pacov will review pt and let CM know if they can accept her so pre cert can be started. Aware per IDR pt may discharge tomorrow. CM also received vm from [address] that [facility] and [facility] can accept. Pt updated on the above and home at [facility] is her first choice. CM called Dennys Luisa [phone] with [facility]  updated on plan of snf so she can follow pt.       Severo Peers, RN, BSN  [phone]

## 2023-08-03 NOTE — CARE COORDINATION
CM met with pt and discussed facilities and ratings. Pt's social security number is . Chart is due to be merged and Dayron Barthel in financial is unable to add to demographics at this time but verified this is pt's correct number. First choice is [facility]. CM called Ashish Martínez with [facility] [phone] and she is reviewing pt. CM sent referrals over [facility] to [facility] and [facility].     Lilia Blueor, RN, BSN  [phone]

## 2023-08-03 NOTE — CARE COORDINATION
[facility] authorization received via [place] Access Portal    Plan Auth ID:  not provided in portal  Milo Orellana ID:  [id number]  Service:  SNF  Approval Dates:  08/03/2023 - 08/07/2023   Next Review Date:  08/07/2023     Cm left VM for Pacov with Home at [facility] that pt is approved and per MD message received may discharge tomorrow depending on echo.     Genny Abdi RN, BSN  [phone]

## 2023-08-03 NOTE — PROGRESS NOTES
[facility]   Electrophysiology Progress Note     Date: 8/3/2023  Admit Date: 7/25/2023     Reason for consultation: Atrial Fibrillation with RVR    Chief Complaint:   Chief Complaint   Patient presents with    Shortness of Breath     SOB for approx 3 week & swelling to right leg for approx 3 days. Denies cardiac/pulm hx. Noted afib RVR in triage. History of Present Illness: History obtained from patient and medical record. Miguelangel Spears is a 69 y.o. female with a history of tobacco dependence. She does not follow with a primary care physician. She denies any history of HTN, CAD, COPD, LEILANI, DM, MI or palpitations. She does not drink alcohol. She started to notice shortness of breath and bilateral leg swelling a month ago when the air quality was poor due to the wildifires. She did not notice any fast heart rate or palpitations at that time. She has smoked for over 40 years and quit 2 weeks ago. She presented to the ED last night with increasing shortness of breath at rest and feeling her \"heart racing'. Interval Hx: Today, she is sitting up in the chair eating breakfast. She denies any chest pain, palpitations. She reports some shortness of breath when walking around unit with PT/OT. She states it has improved over the last couple days. She denies any chest pain, palpitations, or worsening shortness of breath on exertion. She is for potential discharge tomorrow to SNF. She was seen and examined. Clinical notes reviewed. Telemetry reviewed. Assessment and Plan:     New Onset Atrial Fibrillation/ Aflutter   - Duration unknown, has had symptoms since June.                - Sinus rhythm on tele   - likely secondary to hypoxia, CHF exacerbation  - no previous documentation of Atrial Fibrillation PTA  - PEREZ/Cardioversion 8/1/2023   - continue Metoprolol 50 mg BID   - on Eliquis 5mg BID   - Amiodarone 200 mg BID for 7 days, then decrease to 200 mg daily       I have discussed scattered rhonchi, no crackles. Severe kyphosis   Gastrointestinal: Abdomen flat soft and round. Bowel sounds normoactive in all quadrants without tenderness or masses. Musculoskeletal: Bilateral upper and lower extremity strength 5/5 with full ROM  Neurologic/Psych: Awake and orientated to person, place and self. Calm affect, appropriate mood    Pertinent labs, diagnostic, device, and imaging results reviewed as a part of this visit    Labs:    BMP:   Recent Labs     23  0803 23  0903 23  0450     --  137   K 4.1  --  4.4   CL 94*  --  96*   CO2 37*  --  38*   BUN 14  --  17   CREATININE <0.5*  --  <0.5*   MG  --  1.50* 1.80       Estimated Creatinine Clearance: 79 mL/min (based on SCr of 0.5 mg/dL). CBC:   Recent Labs     23  0802   WBC 9.7   HGB 14.6   HCT 45.5   MCV 98.6          Thyroid: No results found for: TSH, N2CHNNL, Q5XNRUB, THYROIDAB  Lipids: No results found for: CHOL, HDL, TRIG  LFTS:   Lab Results   Component Value Date/Time    ALT 15 2023 02:45 PM    AST 20 2023 02:45 PM    ALKPHOS 153 2023 02:45 PM    PROT 7.2 2023 02:45 PM    AGRATIO 1.2 2023 02:45 PM    BILITOT 1.0 2023 02:45 PM     Cardiac Enzymes: No results found for: CKTOTAL, CKMB, CKMBINDEX, TROPONINI  Coags:   Lab Results   Component Value Date/Time    PROTIME 14.5 2023 02:45 PM    INR 1.13 2023 02:45 PM       EC2023  Sinus rhythm with PACs  Rate- 65  PA- 166  QRS- 86  QTc - 264    PEREZ- 2023   ~awaiting formal report    Limited ECHO: 2023  Summary   This was a limited study for pericardial effusion. Technically difficult study due to rapid heart rate and abnormal arrhythmia. Left ventricular cavity size is normal with normal left ventricular wall   thickness. Ejection fraction is visually estimated to be 55%. There is a moderate circumferential pericardial effusion noted.  Improved compared to prior study     Limited ECHO:

## 2023-08-03 NOTE — CARE COORDINATION
CM spoke to Pacov with Home at [facility] and they can accept pt. Cm will start pre cert. CM updated pt. Cm left vm for Let[facility] with [facility] that pt will be going to the above facility.      Cristela Navarrete RN, BSN  [phone]

## 2023-08-04 VITALS
HEART RATE: 71 BPM | SYSTOLIC BLOOD PRESSURE: 131 MMHG | BODY MASS INDEX: 21.95 KG/M2 | HEIGHT: 63 IN | DIASTOLIC BLOOD PRESSURE: 79 MMHG | TEMPERATURE: 97.2 F | WEIGHT: 123.9 LBS | RESPIRATION RATE: 16 BRPM | OXYGEN SATURATION: 93 %

## 2023-08-04 DIAGNOSIS — I48.91 ATRIAL FIBRILLATION WITH RVR (HCC): Primary | ICD-10-CM

## 2023-08-04 PROCEDURE — 2700000000 HC OXYGEN THERAPY PER DAY

## 2023-08-04 PROCEDURE — 93325 DOPPLER ECHO COLOR FLOW MAPG: CPT

## 2023-08-04 PROCEDURE — 6370000000 HC RX 637 (ALT 250 FOR IP): Performed by: HOSPITALIST

## 2023-08-04 PROCEDURE — 97530 THERAPEUTIC ACTIVITIES: CPT

## 2023-08-04 PROCEDURE — 94640 AIRWAY INHALATION TREATMENT: CPT

## 2023-08-04 PROCEDURE — 93308 TTE F-UP OR LMTD: CPT

## 2023-08-04 PROCEDURE — 2580000003 HC RX 258: Performed by: STUDENT IN AN ORGANIZED HEALTH CARE EDUCATION/TRAINING PROGRAM

## 2023-08-04 PROCEDURE — 6360000002 HC RX W HCPCS: Performed by: HOSPITALIST

## 2023-08-04 PROCEDURE — 6370000000 HC RX 637 (ALT 250 FOR IP): Performed by: NURSE PRACTITIONER

## 2023-08-04 PROCEDURE — 97535 SELF CARE MNGMENT TRAINING: CPT

## 2023-08-04 PROCEDURE — 94761 N-INVAS EAR/PLS OXIMETRY MLT: CPT

## 2023-08-04 PROCEDURE — 93321 DOPPLER ECHO F-UP/LMTD STD: CPT

## 2023-08-04 RX ORDER — AMIODARONE HYDROCHLORIDE 200 MG/1
TABLET ORAL
Qty: 38 TABLET | Refills: 0 | Status: SHIPPED | OUTPATIENT
Start: 2023-08-04 | End: 2023-08-04 | Stop reason: SDUPTHER

## 2023-08-04 RX ORDER — IPRATROPIUM BROMIDE AND ALBUTEROL SULFATE 2.5; .5 MG/3ML; MG/3ML
1 SOLUTION RESPIRATORY (INHALATION)
Status: DISCONTINUED | OUTPATIENT
Start: 2023-08-04 | End: 2023-08-04 | Stop reason: HOSPADM

## 2023-08-04 RX ORDER — METOPROLOL TARTRATE 50 MG/1
50 TABLET, FILM COATED ORAL 2 TIMES DAILY
Qty: 60 TABLET | Refills: 3 | Status: SHIPPED | OUTPATIENT
Start: 2023-08-04

## 2023-08-04 RX ORDER — AMIODARONE HYDROCHLORIDE 200 MG/1
200 TABLET ORAL 2 TIMES DAILY
Qty: 8 TABLET | Refills: 0 | Status: SHIPPED | OUTPATIENT
Start: 2023-08-04 | End: 2023-08-04 | Stop reason: SDUPTHER

## 2023-08-04 RX ORDER — FUROSEMIDE 40 MG/1
40 TABLET ORAL 2 TIMES DAILY
Qty: 60 TABLET | Refills: 0 | Status: SHIPPED | OUTPATIENT
Start: 2023-08-04 | End: 2023-09-03

## 2023-08-04 RX ORDER — AMIODARONE HYDROCHLORIDE 100 MG/1
200 TABLET ORAL DAILY
Qty: 60 TABLET | Refills: 0 | Status: SHIPPED | OUTPATIENT
Start: 2023-08-08 | End: 2023-08-04 | Stop reason: HOSPADM

## 2023-08-04 RX ORDER — AMIODARONE HYDROCHLORIDE 200 MG/1
200 TABLET ORAL DAILY
Qty: 30 TABLET | Refills: 0 | Status: SHIPPED | OUTPATIENT
Start: 2023-08-04

## 2023-08-04 RX ADMIN — FUROSEMIDE 40 MG: 10 INJECTION, SOLUTION INTRAMUSCULAR; INTRAVENOUS at 08:12

## 2023-08-04 RX ADMIN — IPRATROPIUM BROMIDE AND ALBUTEROL SULFATE 1 DOSE: .5; 3 SOLUTION RESPIRATORY (INHALATION) at 09:19

## 2023-08-04 RX ADMIN — APIXABAN 5 MG: 5 TABLET, FILM COATED ORAL at 08:12

## 2023-08-04 RX ADMIN — METOPROLOL TARTRATE 50 MG: 50 TABLET ORAL at 08:12

## 2023-08-04 RX ADMIN — IPRATROPIUM BROMIDE AND ALBUTEROL SULFATE 1 DOSE: .5; 3 SOLUTION RESPIRATORY (INHALATION) at 13:18

## 2023-08-04 RX ADMIN — AMIODARONE HYDROCHLORIDE 200 MG: 200 TABLET ORAL at 08:12

## 2023-08-04 RX ADMIN — Medication 10 ML: at 08:12

## 2023-08-04 ASSESSMENT — PAIN SCALES - GENERAL: PAINLEVEL_OUTOF10: 0

## 2023-08-04 NOTE — CARE COORDINATION
CM had transport arranged for 5:15- 5:30 pm with [facility] transport to go to home at [facility]. CM updated pt and called nephew while in room who will bring pt's keys and wallet to hospital for her before she leaves. ORTIZ updated Allyssa Bueno at [facility] at [facility].     Jessica Scott RN, BSN  [phone]

## 2023-08-04 NOTE — DISCHARGE INSTRUCTIONS
Do you have the help you need at home? What symptoms or health problems do you need to look out for after you leave the hospital? Call your physician if you have any of these symptoms.   :   If you have sudden, severe shortness of breath or shortness of breath while resting. Pain, tenderness, warmth or redness in your calf. Temperature  greater than 101? Oh Presume Persistent diarrhea, nausea or vomiting. If you are unable to eat, or unable to drink 5 glasses of fluid a day for more than one day. If you experience the same pain or other symptoms that brought you to the hospital.  If you become lightheaded or dizzy. If you think something is seriously wrong go to the nearest emergency room!     Call 911 for any EMERGENCY and go to the nearest hospital!

## 2023-08-04 NOTE — CARE COORDINATION
08/04/23 0903   IMM Letter   IMM Letter given to Patient/Family/Significant other/Guardian/POA/by: Ko Lopez RN CM   IMM Letter date given: 08/04/23   IMM Letter time given: 0512  (copy made for hard chart)

## 2023-08-04 NOTE — PROGRESS NOTES
08/04/23 0151   RT Protocol   History Pulmonary Disease 2   Respiratory pattern 2   Breath sounds 4   Cough 1   Indications for Bronchodilator Therapy Decreased or absent breath sounds   Bronchodilator Assessment Score 9

## 2023-08-04 NOTE — PROGRESS NOTES
30 cardiac even monitor placed on patient. She v/u, however, will likely need reinforcement and/or help with changing/charging device. Spoke with floor RN who will let nursing facility know about the monitor and the need for assistance.

## 2023-08-04 NOTE — CARE COORDINATION
VM received from Pacov this am with home at [facility] [phone] that she received my message pt was approved and should d/c today after echo. CM met with pt and updated her on the above.     Shawn Valle RN, BSN  [phone]

## 2023-08-04 NOTE — PROGRESS NOTES
CLINICAL PHARMACY NOTE: MEDS TO BEDS    Total # of Prescriptions Filled: 4   The following medications were delivered to the patient:  AMIODARONE HCL 200MG  METOPROLOL TARTRATE 50MG  FUROSEMIDE 40MG  ELIQUIS 5MG    Additional Documentation:  DELIVERED TO PATIENTS ROOM = SIGNED  OK TO BE DELIVERED PER BELLA CLAROS [facility].

## 2023-08-04 NOTE — CARE COORDINATION
Discharge Plan:     Patient discharged to:home at [facility]   SW/DC Planner faxed, [address] and [address] FE:[phone]  Narcotic Prescriptions faxed were:n/a   RN: Meagan Orr will call report to:     [phone]  Medical Transport with:[facility] [phone]   time:5:15-5:30 PM   Family advised of discharge?:yes pt's nephew   HENS Submitted?:  yes   All discharge needs met per case management.       Deana Welch, RN, BSN  [phone]

## 2023-08-04 NOTE — PROGRESS NOTES
[facility] Department   Phone: [phone]    Occupational Therapy    [] Initial Evaluation            [x] Daily Treatment Note         [] Discharge Summary      Patient: Alix Ash   : 1945   MRN: [id number]   Date of Service:  2023    Admitting Diagnosis:  Acute respiratory failure with hypoxia [facility])  Current Admission Summary: 69 y.o. female with no significant past no history who presented to [facility] with complaints of shortness of breath. Patient states for the last 2-3 weeks she has been having progressively worsening shortness of breath. She has never had anything like this happen to her before. She states she noticed her breathing trouble started when the [place] were at their peak in late . Additionally, patient states used to smoke 1/2 pack/day, but at that time when she started to notice her shortness of breath, she decided to quit smoking as well. Additionally, she states that she has had bilateral leg swelling, which is new for her as well. No history of blood clots. No history of heart failure or MIs. Additionally, patient was found to be in atrial fibrillation with RVR in the ED. Patient has never had atrial fibrillation in the past.  Not on any blood thinners or antiarrhythmics. Patient denies chest pain, nausea, vomiting, GI/ symptoms, edema, fever, or chills. Former smoker. Quit 4 weeks ago. Used to smoke 1 pack/day for 50+ years. \"   - LE dropplers negative for DVT, RR for dyspnea and transfer to CVU    Past Medical History:  has no past medical history on file. Past Surgical History:  has no past surgical history on file. Discharge Recommendations: Alix Ash scored a 16/ on the AM-PAC ADL Inpatient form. Current research shows that an AM-PAC score of 17 or less is typically not associated with a discharge to the patient's home setting.  Based on the patient's AM-PAC score and their current wants to walk. Pt initially on 2L O2 and saturating 95%. Pain: 0/10  Pain Interventions: not applicable        Activities of Daily Living  Basic Activities of Daily Living  Upper Extremity Dressing: stand by assistance Increased time to complete task : completed in sitting. Pt requires increased time to fasten her bra. Lower Extremity Dressing: stand by assistance contact guard assistance Increased time to complete task : SBA for threading underwear and pants in sitting; CGA for pulling knees to hips in stance. Toileting: stand by assistance Increased time to complete task : pt ambulated to bathroom with 4WW. SBA for all toileting components. Instrumental Activities of Daily Living  No IADL completed on this date. Functional Mobility  Bed Mobility:  Supine to Sit: stand by assistance  Scooting: stand by assistance  Comments: HOB elevated  Transfers:  Sit to stand transfer:stand by assistance  Stand to sit transfer: stand by assistance  Toilet transfer: contact guard assistance, standard commode with grab bar. Comments: Min cues for hand placement. Functional Mobility  Functional Mobility Activity: ambulated 40'x2 in unit hallway with 4WW on RA to trial weaning O2. Pt desaturated to 83% after ambulation and requires 3-5 min for recovery --increased to 3L to return to normal parameters. Pt does not require sitting rest breaks.     Device Use: rollator (3QQH)  Required Assistance: contact guard assistance  Balance:  Static Sitting Balance: fair (+): maintains balance at SBA/supervision without use of UE support  Dynamic Sitting Balance: fair (+): maintains balance at SBA/supervision without use of UE support  Static Standing Balance: fair (-): maintains balance at CGA with use of UE support  Dynamic Standing Balance: fair (-): maintains balance at CGA with use of UE support  Comments:     Other Therapeutic Interventions    Functional Outcomes  AM-PAC Inpatient Daily Activity Raw Score:

## 2023-08-07 ENCOUNTER — TELEPHONE (OUTPATIENT)
Dept: CARDIOLOGY CLINIC | Age: 78
End: 2023-08-07

## 2023-08-07 DIAGNOSIS — R94.31 ABNORMAL ECG: ICD-10-CM

## 2023-08-07 DIAGNOSIS — I31.39 IDIOPATHIC PERICARDIAL EFFUSION: Primary | ICD-10-CM

## 2023-08-07 NOTE — DISCHARGE SUMMARY
[address]  HOSPITALISTS DISCHARGE SUMMARY    Patient Demographics    Patient. Miguelangel Spears  Date of Birth. 1945  MRN. [id number]     Primary care provider. No primary care provider on file. (Tel: None)    Admit date: 7/25/2023    Discharge date (blank if same as Note Date): 8/4/2023  Note Date: 8/7/2023     Reason for Hospitalization. Chief Complaint   Patient presents with    Shortness of Breath     SOB for approx 3 week & swelling to right leg for approx 3 days. Denies cardiac/pulm hx. Noted afib RVR in triage. [facility] Course. Miguelangel Spears is a 69 y.o. female with pmh of  who presents with Acute respiratory failure with hypoxia ([address])        Plan:   Acute hypoxic respiratory failure  -Mickie secondary A-fib RVR CHF and also pleural effusion  -Status post thoracocentesis fluid analysis noted so far no culture growth likely transudative  -Improvement in symptoms patient transition to p.o. Lasix  Oxygen requirement improved     Pericardial effusion  Unclear etiology.    -Continues to improve continue medical management   -Repeat upper echogram did not show any further worsening x-ray showed improvement in effusion patient discharged stable     COPD continue breathing treatment  -On breathing treatment on discharge y     Acute on chronic CHF exacerbation  Improving continue diuresis back to baseline     Hypokalemia secondary to above improving     A-fib with RVR  -Patient underwent cardioversion. Which did not administer now this morning  Normal sinus rate is controlled patient cardiology recommendation continue to coagulation on Eliquis     CHF acute on chronic with severe pulmonary hypertension  Improving slowly    Consults.   IP CONSULT TO HOSPITALIST  IP CONSULT TO CARDIOLOGY  IP CONSULT TO PULMONOLOGY  IP CONSULT TO PALLIATIVE CARE    Physical

## 2023-08-07 NOTE — TELEPHONE ENCOUNTER
Miguel Flower from [facility] called asking stating they just got the pt there, and they need some kind of  directions on how to care for the monitor the pt is wearing they do not having any information on the monitor,   There is a black mary however there are no cords to charge it      Miguel Flower  [phone]  Fax   [phone]

## 2023-08-08 NOTE — TELEPHONE ENCOUNTER
Was transferred multiple time and ultimately left a message for Nola with [facility] to return call for message regarding heart monitor.

## 2023-08-24 PROBLEM — J44.9 CHRONIC OBSTRUCTIVE PULMONARY DISEASE (HCC): Status: ACTIVE | Noted: 2023-07-26

## 2023-08-25 ENCOUNTER — OFFICE VISIT (OUTPATIENT)
Dept: CARDIOLOGY CLINIC | Age: 78
End: 2023-08-25
Payer: MEDICARE

## 2023-08-25 ENCOUNTER — HOSPITAL ENCOUNTER (OUTPATIENT)
Age: 78
Discharge: HOME | End: 2023-08-25
Payer: MEDICARE

## 2023-08-25 VITALS
HEIGHT: 63 IN | HEART RATE: 60 BPM | WEIGHT: 112 LBS | BODY MASS INDEX: 19.84 KG/M2 | SYSTOLIC BLOOD PRESSURE: 102 MMHG | DIASTOLIC BLOOD PRESSURE: 68 MMHG | OXYGEN SATURATION: 97 %

## 2023-08-25 DIAGNOSIS — R63.4 WEIGHT LOSS: ICD-10-CM

## 2023-08-25 DIAGNOSIS — I48.91 ATRIAL FIBRILLATION WITH RVR (HCC): ICD-10-CM

## 2023-08-25 DIAGNOSIS — I31.39 PERICARDIAL EFFUSION: ICD-10-CM

## 2023-08-25 DIAGNOSIS — R06.02 SHORTNESS OF BREATH: ICD-10-CM

## 2023-08-25 DIAGNOSIS — I48.91 ATRIAL FIBRILLATION WITH RVR (HCC): Primary | ICD-10-CM

## 2023-08-25 DIAGNOSIS — J90 PLEURAL EFFUSION: ICD-10-CM

## 2023-08-25 DIAGNOSIS — I50.21 ACUTE SYSTOLIC CONGESTIVE HEART FAILURE (HCC): ICD-10-CM

## 2023-08-25 DIAGNOSIS — J44.9 CHRONIC OBSTRUCTIVE PULMONARY DISEASE, UNSPECIFIED COPD TYPE (HCC): ICD-10-CM

## 2023-08-25 DIAGNOSIS — Z99.81 DEPENDENCE ON CONTINUOUS SUPPLEMENTAL OXYGEN: ICD-10-CM

## 2023-08-25 LAB
ALBUMIN SERPL-MCNC: 3.9 G/DL (ref 3.4–5)
ALBUMIN/GLOB SERPL: 1.1 {RATIO} (ref 1.1–2.2)
ALP SERPL-CCNC: 143 U/L (ref 40–129)
ALT SERPL-CCNC: 15 U/L (ref 10–40)
ANION GAP SERPL CALCULATED.3IONS-SCNC: 13 MMOL/L (ref 3–16)
AST SERPL-CCNC: 20 U/L (ref 15–37)
BASOPHILS # BLD: 0.1 K/UL (ref 0–0.2)
BASOPHILS NFR BLD: 0.9 %
BILIRUB SERPL-MCNC: 0.5 MG/DL (ref 0–1)
BUN SERPL-MCNC: 16 MG/DL (ref 7–20)
CALCIUM SERPL-MCNC: 8.9 MG/DL (ref 8.3–10.6)
CHLORIDE SERPL-SCNC: 100 MMOL/L (ref 99–110)
CHOLEST SERPL-MCNC: 187 MG/DL (ref 0–199)
CO2 SERPL-SCNC: 29 MMOL/L (ref 21–32)
CREAT SERPL-MCNC: <0.5 MG/DL (ref 0.6–1.2)
DEPRECATED RDW RBC AUTO: 14.9 % (ref 12.4–15.4)
EOSINOPHIL # BLD: 0.3 K/UL (ref 0–0.6)
EOSINOPHIL NFR BLD: 2.6 %
FERRITIN SERPL IA-MCNC: 371.4 NG/ML (ref 15–150)
GFR SERPLBLD CREATININE-BSD FMLA CKD-EPI: >60 ML/MIN/{1.73_M2}
GLUCOSE SERPL-MCNC: 52 MG/DL (ref 70–99)
HCT VFR BLD AUTO: 45.3 % (ref 36–48)
HDLC SERPL-MCNC: 66 MG/DL (ref 40–60)
HGB BLD-MCNC: 15.2 G/DL (ref 12–16)
IRON SATN MFR SERPL: 59 % (ref 15–50)
IRON SERPL-MCNC: 199 UG/DL (ref 37–145)
LDLC SERPL CALC-MCNC: 94 MG/DL
LYMPHOCYTES # BLD: 1.7 K/UL (ref 1–5.1)
LYMPHOCYTES NFR BLD: 17.8 %
MCH RBC QN AUTO: 32.9 PG (ref 26–34)
MCHC RBC AUTO-ENTMCNC: 33.6 G/DL (ref 31–36)
MCV RBC AUTO: 98 FL (ref 80–100)
MONOCYTES # BLD: 1 K/UL (ref 0–1.3)
MONOCYTES NFR BLD: 10.9 %
NEUTROPHILS # BLD: 6.5 K/UL (ref 1.7–7.7)
NEUTROPHILS NFR BLD: 67.8 %
NT-PROBNP SERPL-MCNC: 1216 PG/ML (ref 0–449)
PLATELET # BLD AUTO: 232 K/UL (ref 135–450)
PMV BLD AUTO: 9.2 FL (ref 5–10.5)
POTASSIUM SERPL-SCNC: 3.6 MMOL/L (ref 3.5–5.1)
PROT SERPL-MCNC: 7.4 G/DL (ref 6.4–8.2)
RBC # BLD AUTO: 4.62 M/UL (ref 4–5.2)
SODIUM SERPL-SCNC: 142 MMOL/L (ref 136–145)
TIBC SERPL-MCNC: 335 UG/DL (ref 260–445)
TRIGL SERPL-MCNC: 133 MG/DL (ref 0–150)
VLDLC SERPL CALC-MCNC: 27 MG/DL
WBC # BLD AUTO: 9.5 K/UL (ref 4–11)

## 2023-08-25 PROCEDURE — 99215 OFFICE O/P EST HI 40 MIN: CPT | Performed by: INTERNAL MEDICINE

## 2023-08-25 PROCEDURE — 1123F ACP DISCUSS/DSCN MKR DOCD: CPT | Performed by: INTERNAL MEDICINE

## 2023-08-25 PROCEDURE — 36415 COLL VENOUS BLD VENIPUNCTURE: CPT

## 2023-08-25 PROCEDURE — 80053 COMPREHEN METABOLIC PANEL: CPT

## 2023-08-25 PROCEDURE — 85025 COMPLETE CBC W/AUTO DIFF WBC: CPT

## 2023-08-25 PROCEDURE — 83550 IRON BINDING TEST: CPT

## 2023-08-25 PROCEDURE — 83540 ASSAY OF IRON: CPT

## 2023-08-25 PROCEDURE — 80061 LIPID PANEL: CPT

## 2023-08-25 PROCEDURE — 83880 ASSAY OF NATRIURETIC PEPTIDE: CPT

## 2023-08-25 PROCEDURE — 82728 ASSAY OF FERRITIN: CPT

## 2023-08-25 PROCEDURE — 93000 ELECTROCARDIOGRAM COMPLETE: CPT | Performed by: INTERNAL MEDICINE

## 2023-08-25 RX ORDER — TORSEMIDE 20 MG/1
20 TABLET ORAL 2 TIMES DAILY
Qty: 180 TABLET | Refills: 3 | Status: SHIPPED | OUTPATIENT
Start: 2023-08-25

## 2023-08-25 NOTE — PROGRESS NOTES
Placed 14 days Biotel heart monitor on pt and pt was given instructions. Pt verbalized understanding.     Device Name: [id number]
patient. Patient counseled on lifestyle modification, diet, and exercise. Follow Up: labs, echo pft, and follow up in 1 month     Dr Bassam Sharma:  I, Sravanthi Toscano, am scribing for and in the presence of Glory Armenta MD.   Cammy Murphy 08/25/23 3:14 PM     Physician Attestation  The scribe wrote this note in the presence of me Coby Fang MD). The scribe may have prepopulated components of this note with my historical  intellectual property under my direct supervision. Any additions to this intellectual property were performed in my presence and at my direction. Furthermore, the content and accuracy of this note have been reviewed by me with edits by me as needed. Coby Fang MD 8/25/2023 3:18 PM    Time spent: 49 minutes; 22 minutes face-to-face with the patient interviewing and examining and providing counseling/education about various medical issues, remainder pre/post chart preparation time including reviewing recent progress notes, diagnostic studies, and laboratory results, as well as placing new orders and coordinating the patient's care.

## 2023-08-25 NOTE — PATIENT INSTRUCTIONS
- Please get established with a pcp, recommend [phone] Dr Barbosa Inch  - [facility] monitor [facility] to [facility]  - a new monitor will be placed today  - refer to Dr Tonia Begum  - labs  - pft  - echo  - stop lasix  - start demadex 20 mg twice a day  - daily weights, call if over 3 pounds in 24 hours or 5 pounds in a week  - limit salt in diet- 2 gm daily  - call for bleeding in blood urine or stool  - increase oxygen for a pulse ox less than 90%, max 5 liters --call if this is not achieving pulse ox greater than 90%

## 2023-08-28 ENCOUNTER — HOSPITAL ENCOUNTER (OUTPATIENT)
Dept: PULMONOLOGY | Age: 78
Discharge: HOME | End: 2023-08-28
Payer: MEDICARE

## 2023-08-28 ENCOUNTER — PROCEDURE VISIT (OUTPATIENT)
Dept: CARDIOLOGY CLINIC | Age: 78
End: 2023-08-28
Payer: MEDICARE

## 2023-08-28 DIAGNOSIS — R06.02 SHORTNESS OF BREATH: ICD-10-CM

## 2023-08-28 DIAGNOSIS — J44.9 CHRONIC OBSTRUCTIVE PULMONARY DISEASE, UNSPECIFIED COPD TYPE (HCC): ICD-10-CM

## 2023-08-28 DIAGNOSIS — R94.31 ABNORMAL ECG: ICD-10-CM

## 2023-08-28 DIAGNOSIS — I31.39 PERICARDIAL EFFUSION: ICD-10-CM

## 2023-08-28 DIAGNOSIS — I31.39 IDIOPATHIC PERICARDIAL EFFUSION: ICD-10-CM

## 2023-08-28 DIAGNOSIS — I50.21 ACUTE SYSTOLIC CONGESTIVE HEART FAILURE (HCC): ICD-10-CM

## 2023-08-28 DIAGNOSIS — J90 PLEURAL EFFUSION: ICD-10-CM

## 2023-08-28 LAB
DLCO %PRED: 30 %
DLCO PRED: NORMAL
DLCO/VA %PRED: NORMAL
DLCO/VA PRED: NORMAL
DLCO/VA: NORMAL
DLCO: NORMAL
EXPIRATORY TIME-POST: NORMAL
EXPIRATORY TIME: NORMAL
FEF 25-75% %CHNG: NORMAL
FEF 25-75% %PRED-POST: NORMAL
FEF 25-75% %PRED-PRE: NORMAL
FEF 25-75% PRED: NORMAL
FEF 25-75%-POST: NORMAL
FEF 25-75%-PRE: NORMAL
FEV1 %PRED-POST: 40 %
FEV1 %PRED-PRE: 50 %
FEV1 PRED: NORMAL
FEV1-POST: NORMAL
FEV1-PRE: NORMAL
FEV1/FVC %PRED-POST: NORMAL
FEV1/FVC %PRED-PRE: NORMAL
FEV1/FVC PRED: NORMAL
FEV1/FVC-POST: 64 %
FEV1/FVC-PRE: 61 %
FVC %PRED-POST: NORMAL
FVC %PRED-PRE: NORMAL
FVC PRED: NORMAL
FVC-POST: NORMAL
FVC-PRE: NORMAL
GAW %PRED: NORMAL
GAW PRED: NORMAL
GAW: NORMAL
IC %PRED: NORMAL
IC PRED: NORMAL
IC: NORMAL
LV EF: 60 %
LVEF MODALITY: NORMAL
MEP: NORMAL
MIP: NORMAL
MVV %PRED-PRE: NORMAL
MVV PRED: NORMAL
MVV-PRE: NORMAL
PEF %PRED-POST: NORMAL
PEF %PRED-PRE: NORMAL
PEF PRED: NORMAL
PEF%CHNG: NORMAL
PEF-POST: NORMAL
PEF-PRE: NORMAL
RAW %PRED: NORMAL
RAW PRED: NORMAL
RAW: NORMAL
RV %PRED: NORMAL
RV PRED: NORMAL
RV: NORMAL
SVC %PRED: NORMAL
SVC PRED: NORMAL
SVC: NORMAL
TLC %PRED: 117 %
TLC PRED: NORMAL
TLC: NORMAL
VA %PRED: NORMAL
VA PRED: NORMAL
VA: NORMAL
VTG %PRED: NORMAL
VTG PRED: NORMAL
VTG: NORMAL

## 2023-08-28 PROCEDURE — 93306 TTE W/DOPPLER COMPLETE: CPT | Performed by: INTERNAL MEDICINE

## 2023-08-28 PROCEDURE — 94726 PLETHYSMOGRAPHY LUNG VOLUMES: CPT

## 2023-08-28 PROCEDURE — 94729 DIFFUSING CAPACITY: CPT

## 2023-08-28 PROCEDURE — 94060 EVALUATION OF WHEEZING: CPT

## 2023-08-28 PROCEDURE — 94760 N-INVAS EAR/PLS OXIMETRY 1: CPT

## 2023-08-28 ASSESSMENT — PULMONARY FUNCTION TESTS
FEV1/FVC_POST: 64
FEV1/FVC_PRE: 61
FEV1_PERCENT_PREDICTED_PRE: 50
FEV1_PERCENT_PREDICTED_POST: 40

## 2023-08-29 PROCEDURE — 94726 PLETHYSMOGRAPHY LUNG VOLUMES: CPT | Performed by: INTERNAL MEDICINE

## 2023-08-29 PROCEDURE — 94729 DIFFUSING CAPACITY: CPT | Performed by: INTERNAL MEDICINE

## 2023-08-29 PROCEDURE — 94060 EVALUATION OF WHEEZING: CPT | Performed by: INTERNAL MEDICINE

## 2023-08-29 NOTE — PROCEDURES
Pulmonary Function Testing      Patient name:  Atilio Mac     [facility] Unit #:   [id number]   Date of test:  8/28/2023   Date of interpretation:   8/29/2023    Ms. Atilio Mac is a 69y.o. year-old non smoker. The spirometry data were acceptable and reproducible. Spirometry:  Flow volume loops were obstructed. The FEV-1/FVC ratio was decreased. The FEV-1 was 1 liters (50% of predicted), which was severely decreased. The FVC was 1.63 liters (61% of predicted), which was decreased. Response to inhaled bronchodilators (albuterol) was nonsignificant. Lung volumes:  Lung volumes were tested by plethysmography. The total lung capacity was 5.35 liters (117% of predicted), which was increased. The residual volume was 3.66 liters (192% of predicted), which was increased . The ratio of residual volume to total lung capacity (RV/TLC) was 164% predicted, which was increased. Diffusion capacity was found to be 30% which is severely decreased. Interpretation:  Severe obstructive lung disease with air trapping and hyperinflation. Diffusion capacity is also severely decreased.     Comments:

## 2023-08-30 ENCOUNTER — TELEPHONE (OUTPATIENT)
Dept: CARDIOLOGY CLINIC | Age: 78
End: 2023-08-30

## 2023-08-30 NOTE — TELEPHONE ENCOUNTER
----- Message from Eh Cotton MD sent at 8/29/2023  4:42 PM EDT -----  Heart muscle is strong but stiff  Valves are still leaking but probably not bad enough to need surgery  Will talk about it more next month when I see her again in the office

## 2023-09-08 PROCEDURE — 93228 REMOTE 30 DAY ECG REV/REPORT: CPT | Performed by: INTERNAL MEDICINE

## 2023-09-12 DIAGNOSIS — I48.91 ATRIAL FIBRILLATION WITH RVR (HCC): ICD-10-CM

## 2023-09-13 NOTE — TELEPHONE ENCOUNTER
Medication Refill    Medication needing refilled:  ELIQUIS    Dosage of the medication:  5mg     How are you taking this medication (QD, BID, TID, QID, PRN):  Take 1 tablet by mouth 2 times daily    30 or 90 day supply called in: 60    When will you run out of your medication:    Which Pharmacy are we sending the medication to?:    [address] [place], [address]  Phone:  [phone]  Fax:  [phone]       NOTE  Sandip Kaye from the [facility] is wanting to may sure that the Pt is still suppose to be taking Eliquis as Per Sandip Kaye the  was not to sure. Please advise.   Thank you

## 2023-09-14 NOTE — TELEPHONE ENCOUNTER
I spoke to pt to see if she had any questions about the Eliquis and she could not recall having any questions about her medication. Pt was started on Eliquis 7/25/2023 8/25/2023 OV with WAK     Pt wore a MCOT results are under media tab. Next OV with [facility]AK is 9/28/2023. RX pending.

## 2023-09-19 NOTE — PROGRESS NOTES
[address]  [phone]  9/28/23  Referring:   REASON FOR CONSULT/CHIEF COMPLAINT/HPI     Reason for visit/ Chief complaint  AF,  diastolic heart failure, valve disease  Hospital f/u   HPI Lluvia Larose is a 69 y.o. seen as a follow up for management of AF. She has a history of not seeing a doctor for 50 years. Tobacco abuse ( started smoking at age 26, quit smoking when she was admitted to hospital, 1.5 pack a day), copd    On 7/25 she was seen in the ER with 3 week history of increasing shortness of breath and heart racing. She was found to have AF RVR , pleural and pericardial effusion, chf, pulmonary hypertension. She was started a cardizem ( drip converted to oral) metoprolol, lovenox. She had a right thoracentesis with 750 cc of fluid removed. On 8/1 she had a cardioversion Todd Padma). She was diuresed, discharged on amiodarone eliquis lasix and metoprolol. Since discharge she has been staying with her sister who lives in [place]. Does not have a pcp. Today she is here with her niece. She is doing well. She denies CP, SOB, palpitations or dizziness at this time. She has nebulizer and inhalers from Dr Shu Jj recently. She is feeling better since starting the Torsemide. She has gained 2 lbs. She has oxygen available but rarely needs to use it anymore since I switched her diuretic. Sats now usually 99% when she checkes it during the day. She lost about 25 pounds in the last year prior to admission. Thus far, no evidence of cancer has been seen. She has been gaining weight now that she is feeling better.     Patient is adherent with medications and is tolerating them well without side effects     HISTORY/ALLERGIES/ROS     MedHx:   Past Medical History:   Diagnosis Date    Abnormal weight loss     Chronic combined systolic and diastolic heart failure (HCC)     COPD with emphysema (HCC)     PAF (paroxysmal atrial fibrillation) (HCC)     Pericardial effusion

## 2023-09-21 ENCOUNTER — OFFICE VISIT (OUTPATIENT)
Dept: PULMONOLOGY | Age: 78
End: 2023-09-21
Payer: MEDICARE

## 2023-09-21 ENCOUNTER — HOSPITAL ENCOUNTER (OUTPATIENT)
Dept: GENERAL RADIOLOGY | Age: 78
Discharge: HOME | End: 2023-09-21
Payer: MEDICARE

## 2023-09-21 ENCOUNTER — HOSPITAL ENCOUNTER (OUTPATIENT)
Age: 78
Discharge: HOME | End: 2023-09-21
Payer: MEDICARE

## 2023-09-21 VITALS
WEIGHT: 107 LBS | SYSTOLIC BLOOD PRESSURE: 110 MMHG | HEART RATE: 52 BPM | DIASTOLIC BLOOD PRESSURE: 68 MMHG | HEIGHT: 63 IN | OXYGEN SATURATION: 98 % | BODY MASS INDEX: 18.96 KG/M2

## 2023-09-21 DIAGNOSIS — J44.9 COPD, SEVERE (HCC): ICD-10-CM

## 2023-09-21 DIAGNOSIS — Z87.09 HISTORY OF PLEURAL EFFUSION: Primary | ICD-10-CM

## 2023-09-21 DIAGNOSIS — Z87.09 HISTORY OF PLEURAL EFFUSION: ICD-10-CM

## 2023-09-21 DIAGNOSIS — I50.43 ACUTE ON CHRONIC COMBINED SYSTOLIC AND DIASTOLIC CONGESTIVE HEART FAILURE (HCC): ICD-10-CM

## 2023-09-21 PROCEDURE — 99204 OFFICE O/P NEW MOD 45 MIN: CPT | Performed by: INTERNAL MEDICINE

## 2023-09-21 PROCEDURE — 71046 X-RAY EXAM CHEST 2 VIEWS: CPT

## 2023-09-21 RX ORDER — ALBUTEROL SULFATE 90 UG/1
2 AEROSOL, METERED RESPIRATORY (INHALATION) EVERY 6 HOURS PRN
Qty: 18 G | Refills: 3 | Status: SHIPPED | OUTPATIENT
Start: 2023-09-21

## 2023-09-21 RX ORDER — ALBUTEROL SULFATE 2.5 MG/3ML
2.5 SOLUTION RESPIRATORY (INHALATION) EVERY 6 HOURS PRN
Qty: 120 EACH | Refills: 3 | Status: SHIPPED | OUTPATIENT
Start: 2023-09-21

## 2023-09-21 RX ORDER — FLUTICASONE FUROATE, UMECLIDINIUM BROMIDE AND VILANTEROL TRIFENATATE 100; 62.5; 25 UG/1; UG/1; UG/1
1 POWDER RESPIRATORY (INHALATION) DAILY
Qty: 60 EACH | Refills: 5 | Status: SHIPPED | OUTPATIENT
Start: 2023-09-21

## 2023-09-21 RX ORDER — FLUTICASONE FUROATE, UMECLIDINIUM BROMIDE AND VILANTEROL TRIFENATATE 100; 62.5; 25 UG/1; UG/1; UG/1
1 POWDER RESPIRATORY (INHALATION) DAILY
Qty: 1 EACH | Refills: 3 | Status: SHIPPED | OUTPATIENT
Start: 2023-09-21 | End: 2023-09-21

## 2023-09-21 RX ORDER — ALBUTEROL SULFATE 90 UG/1
2 AEROSOL, METERED RESPIRATORY (INHALATION) 4 TIMES DAILY PRN
Qty: 18 G | Refills: 5 | Status: SHIPPED | OUTPATIENT
Start: 2023-09-21 | End: 2023-09-21

## 2023-09-21 ASSESSMENT — ENCOUNTER SYMPTOMS
SORE THROAT: 0
ANAL BLEEDING: 0
WHEEZING: 0
DIARRHEA: 0
ABDOMINAL DISTENTION: 0
SHORTNESS OF BREATH: 1
SINUS PRESSURE: 0
BACK PAIN: 0
RHINORRHEA: 0
CONSTIPATION: 0
COUGH: 0
CHOKING: 0
STRIDOR: 0
VOICE CHANGE: 0
ABDOMINAL PAIN: 0
CHEST TIGHTNESS: 0
APNEA: 0
BLOOD IN STOOL: 0

## 2023-09-21 NOTE — PROGRESS NOTES
Yony Camilo    YOB: 1945     Date of Service:  9/21/2023     Chief Complaint   Patient presents with    New Patient     Ref by Dr Marisela Mattson      COPD         HPI patient has been accompanied by her niece to our office today. Patient referred for consultation by Dr. Josephine Hardy MD for evaluation of COPD. Patient was recently hospitalized between 7/25 and 8/4 for acute hypoxic respiratory failure, atrial fibrillation with RVR status post DC cardioversion, pericardial effusion and concerns for COPD exacerbation. Patient also required right thoracentesis for pleural effusion. Currently patient states that she has significant WOMACK even with 50-60 steps. Using 2 L O2 continuously. Denies any cough or chest pain. Former smoker, 1-1/2 packs/day for 50 years-discontinued in June. Previously not followed by doctors. No prior diagnosis of COPD or asthma. Or asthma. Denies any prior history of pneumonias. No Known Allergies  Outpatient Medications Marked as Taking for the 9/21/23 encounter (Office Visit) with Beth Thomas MD   Medication Sig Dispense Refill    apixaban (ELIQUIS) 5 MG TABS tablet Take 1 tablet by mouth 2 times daily 60 tablet 3    torsemide (DEMADEX) 20 MG tablet Take 1 tablet by mouth in the morning and at bedtime 180 tablet 3    metoprolol tartrate (LOPRESSOR) 50 MG tablet Take 1 tablet by mouth 2 times daily 60 tablet 3    amiodarone (CORDARONE) 200 MG tablet Take 1 tablet by mouth daily 30 tablet 0         There is no immunization history on file for this patient. Past Medical History:   Diagnosis Date    COPD with emphysema ([address])      History reviewed. No pertinent surgical history. Family History   Problem Relation Age of Onset    Stroke Father        Review of Systems:  Review of Systems   Constitutional:  Positive for fatigue. Negative for activity change, appetite change and fever.    HENT:  Negative for congestion, ear discharge, ear pain, postnasal drip,

## 2023-09-28 ENCOUNTER — OFFICE VISIT (OUTPATIENT)
Dept: CARDIOLOGY CLINIC | Age: 78
End: 2023-09-28
Payer: MEDICARE

## 2023-09-28 VITALS
SYSTOLIC BLOOD PRESSURE: 98 MMHG | BODY MASS INDEX: 19.47 KG/M2 | WEIGHT: 109.9 LBS | OXYGEN SATURATION: 99 % | HEIGHT: 63 IN | DIASTOLIC BLOOD PRESSURE: 72 MMHG | HEART RATE: 56 BPM

## 2023-09-28 DIAGNOSIS — I48.0 PAF (PAROXYSMAL ATRIAL FIBRILLATION) (HCC): Primary | ICD-10-CM

## 2023-09-28 DIAGNOSIS — I50.22 CHRONIC SYSTOLIC HEART FAILURE (HCC): ICD-10-CM

## 2023-09-28 DIAGNOSIS — J90 PLEURAL EFFUSION: ICD-10-CM

## 2023-09-28 DIAGNOSIS — J43.2 CENTRILOBULAR EMPHYSEMA (HCC): ICD-10-CM

## 2023-09-28 DIAGNOSIS — Z79.899 ON AMIODARONE THERAPY: ICD-10-CM

## 2023-09-28 DIAGNOSIS — R06.02 SHORTNESS OF BREATH: ICD-10-CM

## 2023-09-28 DIAGNOSIS — I50.21 ACUTE SYSTOLIC CONGESTIVE HEART FAILURE (HCC): ICD-10-CM

## 2023-09-28 DIAGNOSIS — J44.9 CHRONIC OBSTRUCTIVE PULMONARY DISEASE, UNSPECIFIED COPD TYPE (HCC): ICD-10-CM

## 2023-09-28 DIAGNOSIS — Z99.81 DEPENDENCE ON CONTINUOUS SUPPLEMENTAL OXYGEN: ICD-10-CM

## 2023-09-28 DIAGNOSIS — Z79.01 ON CONTINUOUS ORAL ANTICOAGULATION: ICD-10-CM

## 2023-09-28 DIAGNOSIS — I31.39 PERICARDIAL EFFUSION: ICD-10-CM

## 2023-09-28 DIAGNOSIS — I50.42 CHRONIC COMBINED SYSTOLIC AND DIASTOLIC HEART FAILURE (HCC): ICD-10-CM

## 2023-09-28 DIAGNOSIS — R63.4 WEIGHT LOSS: ICD-10-CM

## 2023-09-28 PROCEDURE — 99215 OFFICE O/P EST HI 40 MIN: CPT | Performed by: INTERNAL MEDICINE

## 2023-09-28 PROCEDURE — 1123F ACP DISCUSS/DSCN MKR DOCD: CPT | Performed by: INTERNAL MEDICINE

## 2023-09-28 RX ORDER — SPIRONOLACTONE 25 MG/1
25 TABLET ORAL DAILY
Qty: 90 TABLET | Refills: 3 | Status: SHIPPED | OUTPATIENT
Start: 2023-09-28

## 2023-09-28 NOTE — PATIENT INSTRUCTIONS
Follow up with Dr Naomi Mosqueda in Feb- March     Labs in 1 month   Labs prior to next visit     Start Spironolactone 25 mg daily    Start Jardiance 10 mg daily   Call for any questions or concerns.

## 2023-09-29 PROBLEM — I50.42 CHRONIC COMBINED SYSTOLIC AND DIASTOLIC HEART FAILURE (HCC): Status: ACTIVE | Noted: 2023-09-29

## 2023-09-29 PROBLEM — J43.9 COPD WITH EMPHYSEMA (HCC): Status: ACTIVE | Noted: 2023-09-29

## 2023-09-29 PROBLEM — I48.0 PAF (PAROXYSMAL ATRIAL FIBRILLATION) (HCC): Status: ACTIVE | Noted: 2023-09-29

## 2023-10-06 ENCOUNTER — TELEPHONE (OUTPATIENT)
Dept: CARDIOLOGY CLINIC | Age: 78
End: 2023-10-06

## 2023-10-06 NOTE — TELEPHONE ENCOUNTER
Pt states that at her last visit [address] prescribed Jardiance but she is not diabetic and would like to know if she should begin this medication. Also questioning Spironolactone since she is already on torsemide. Should she stop torsemide and begin spironolactone, or should she be on both. Pt states she has not begun taking the Jardiance or the spironolactone until she receives clarification. Please call Nayan Iqbal back at [phone], leave a message if she does not answer.

## 2023-10-13 NOTE — TELEPHONE ENCOUNTER
Returned pateints call to discuss her medications and why they are used. Pt is agreeable to take them at this time. Pt is going to speak to her insurance to see if there is a medication that is cheaper than Jardiance. Recommended asking about Aquilino Black. Pt has no further questions at this time.

## 2023-10-25 ENCOUNTER — TELEPHONE (OUTPATIENT)
Dept: CARDIOLOGY CLINIC | Age: 78
End: 2023-10-25

## 2023-10-25 NOTE — TELEPHONE ENCOUNTER
Called pt, asked if she she still has her holter monitor from August, she said she does. Asked her to return it to our office.

## 2023-12-13 RX ORDER — METOPROLOL TARTRATE 50 MG/1
50 TABLET, FILM COATED ORAL 2 TIMES DAILY
Qty: 60 TABLET | Refills: 3 | Status: SHIPPED | OUTPATIENT
Start: 2023-12-13

## 2023-12-13 NOTE — TELEPHONE ENCOUNTER
Last OV: 23 [facility]  Next OV: 24 RMM  Last Labs: 23  Last EK23  Last Fill:   metoprolol tartrate (LOPRESSOR) 50 MG tablet 60 tablet 3 2023     Sig - Route:  Take 1 tablet by mouth 2 times daily - Oral    Sent to pharmacy as: Metoprolol Tartrate 50 MG Oral Tablet (LOPRESSOR)    E-Prescribing Status: Receipt confirmed by pharmacy (2023  9:25 AM EDT)

## 2023-12-13 NOTE — TELEPHONE ENCOUNTER
Medication Refill    Medication needing refilled: metoprolol tartrate (LOPRESSOR) 50 MG     Dosage of the medication:    How are you taking this medication (QD, BID, TID, QID, PRN): 1 tablet by mouth 2 times daily     30 or 90 day supply called in: 90 day supply    When will you run out of your medication:    Which Pharmacy are we sending the medication to?: [address] [facility] [address] [phone]

## 2024-01-02 ENCOUNTER — OFFICE VISIT (OUTPATIENT)
Dept: PULMONOLOGY | Age: 79
End: 2024-01-02
Payer: MEDICARE

## 2024-01-02 VITALS
BODY MASS INDEX: 19.31 KG/M2 | SYSTOLIC BLOOD PRESSURE: 124 MMHG | HEIGHT: 63 IN | OXYGEN SATURATION: 97 % | DIASTOLIC BLOOD PRESSURE: 70 MMHG | WEIGHT: 109 LBS | HEART RATE: 76 BPM

## 2024-01-02 DIAGNOSIS — J44.9 COPD, SEVERE (HCC): Primary | ICD-10-CM

## 2024-01-02 PROCEDURE — 99213 OFFICE O/P EST LOW 20 MIN: CPT | Performed by: INTERNAL MEDICINE

## 2024-01-02 PROCEDURE — 1123F ACP DISCUSS/DSCN MKR DOCD: CPT | Performed by: INTERNAL MEDICINE

## 2024-01-02 ASSESSMENT — ENCOUNTER SYMPTOMS
VOICE CHANGE: 0
SHORTNESS OF BREATH: 1
ABDOMINAL PAIN: 0
CHOKING: 0
DIARRHEA: 0
CONSTIPATION: 0
SINUS PRESSURE: 0
CHEST TIGHTNESS: 0
STRIDOR: 0
WHEEZING: 0
APNEA: 0
BACK PAIN: 0
ABDOMINAL DISTENTION: 0
COUGH: 0
BLOOD IN STOOL: 0
RHINORRHEA: 0
SORE THROAT: 0
VOMITING: 0
COLOR CHANGE: 0

## 2024-01-02 NOTE — PROGRESS NOTES
heard.     No friction rub.   Pulmonary:      Effort: No respiratory distress.      Breath sounds: Normal breath sounds. No wheezing, rhonchi or rales.   Chest:      Chest wall: No tenderness.   Abdominal:      General: There is no distension.      Palpations: There is no mass.      Tenderness: There is no abdominal tenderness. There is no guarding or rebound.   Musculoskeletal:         General: No swelling, tenderness or deformity.   Lymphadenopathy:      Cervical: No cervical adenopathy.   Skin:     Coloration: Skin is not pale.      Findings: No erythema, lesion or rash.   Neurological:      Mental Status: She is alert and oriented to person, place, and time. Mental status is at baseline.      Motor: No abnormal muscle tone.         Health Maintenance   Topic Date Due    COVID-19 Vaccine (1) Never done    Pneumococcal 65+ years Vaccine (1 - PCV) Never done    Depression Screen  Never done    Hepatitis C screen  Never done    DTaP/Tdap/Td vaccine (1 - Tdap) Never done    Shingles vaccine (1 of 2) Never done    Low dose CT lung screening &/or counseling  Never done    DEXA (modify frequency per FRAX score)  Never done    Respiratory Syncytial Virus (RSV) Pregnant or age 60 yrs+ (1 - 1-dose 60+ series) Never done    Flu vaccine (1) Never done    Annual Wellness Visit (Medicare Advantage)  Never done    Hepatitis A vaccine  Aged Out    Hepatitis B vaccine  Aged Out    Hib vaccine  Aged Out    Polio vaccine  Aged Out    Meningococcal (ACWY) vaccine  Aged Out        Assessment/Plan:    CHF, combined systolic-diastolic dysfunction, EF 40%> improved to 60% on recent echo from August 2023.  Patient required right thoracentesis in July 2023, subsequent chest x-ray shows no significant pleural effusions.    PFT from August showed FEV1 of 1 L [50% predicted, suggestive of severe COPD.  Continue Trelegy 100.  Currently no symptoms suggestive of exacerbation.    Patient's O2 saturations is 97% on room air, we can discontinue

## 2024-01-16 NOTE — TELEPHONE ENCOUNTER
Medication Refill    Medication needing refilled:  apixaban (ELIQUIS) 5 MG - PT IS USING NEW PHARMACY LOCATION LISTED BELOW    Dosage of the medication:    How are you taking this medication (QD, BID, TID, QID, PRN): 1 tab 2 times daily    30 or 90 day supply called in: 90 DAY SUPPLY    When will you run out of your medication:    Which Pharmacy are we sending the medication to?: [id number] [place] [address] - P [phone] - F [phone]

## 2024-01-16 NOTE — TELEPHONE ENCOUNTER
Last OV: 9/28/23 [facility]  Next OV: 3/11/24 [facility]   Last labs: 8/25/23 CMP and BNP  Last filled:   Disp Refills Start End    apixaban (ELIQUIS) 5 MG TABS tablet 60 tablet 3 9/14/2023 --    Sig - Route: Take 1 tablet by mouth 2 times daily - Oral    Sent to pharmacy as: Apixaban 5 MG Oral Tablet (ELIQUIS)    Cosign for Ordering: Accepted by Warren Osullivan MD on 9/14/2023  5:12 PM    E-Prescribing Status: Receipt confirmed by pharmacy (9/14/2023 11:15 AM EDT)

## 2024-01-17 NOTE — TELEPHONE ENCOUNTER
Refill request apixaban (ELIQUIS) 5 MG TABS tablet     Last OV: 9/28/23 [facility]    Last Lab:8/2/23 CBC    Next Appt:3/11/24 [facility]

## 2024-02-12 NOTE — PROGRESS NOTES
[facility]      CONSULTATION  [phone]  3/11/24  Referring:   REASON FOR CONSULT/CHIEF COMPLAINT/HPI     Reason for visit/ Chief complaint  AF,  diastolic heart failure, valve disease  Hospital f/u   HPI Vane Martini is a 78 y.o. seen as a follow up for management of AF.      Prior to my meeting her in 2023 during a hospitalization, she hadn't seen a doctor in over 50 years.  Tobacco abuse ( started smoking at age 19, quit smoking when she was admitted to hospital, 1.5 pack a day), copd.    She was in AF RVR and had a cardioversion and was started on amiodarone.  We have decided to maintain her on amiodarone as she is a poor candidate for an invasive cardiac procedure such as ablation.    Today she is here with her daughter in law. She is doing well. She is off of the oxygen, has been off for a couple of months.     Patient is adherent with medications and is tolerating them well without side effects     HISTORY/ALLERGIES/ROS     MedHx:   Past Medical History:   Diagnosis Date    Abnormal weight loss     Chronic combined systolic and diastolic heart failure (HCC)     COPD with emphysema (HCC)     PAF (paroxysmal atrial fibrillation) (HCC)     Pericardial effusion      SurgHx:  has no past surgical history on file.   SocHx:  reports that she quit smoking about 8 months ago. Her smoking use included cigarettes. She started smoking about 50 years ago. She has a 50.0 pack-year smoking history. She has been exposed to tobacco smoke. She has never used smokeless tobacco. She reports current alcohol use of about 1.0 standard drink of alcohol per week. She reports that she does not currently use drugs.   FamHx:   Family History   Problem Relation Age of Onset    Stroke Father      Allergies: Patient has no known allergies.     MEDICATIONS      Prior to Admission medications    Medication Sig Start Date End Date Taking? Authorizing Provider   apixaban (ELIQUIS) 5 MG TABS tablet Take 1 tablet by mouth 2

## 2024-02-13 PROBLEM — I50.22 CHRONIC SYSTOLIC HEART FAILURE (HCC): Status: ACTIVE | Noted: 2023-09-29

## 2024-02-13 PROBLEM — Z79.899 ON AMIODARONE THERAPY: Status: ACTIVE | Noted: 2024-02-13

## 2024-02-13 PROBLEM — Z79.01 ON CONTINUOUS ORAL ANTICOAGULATION: Status: ACTIVE | Noted: 2024-02-13

## 2024-03-05 ENCOUNTER — HOSPITAL ENCOUNTER (OUTPATIENT)
Age: 79
Discharge: HOME | End: 2024-03-05
Payer: MEDICARE

## 2024-03-05 DIAGNOSIS — I50.22 CHRONIC SYSTOLIC HEART FAILURE (HCC): ICD-10-CM

## 2024-03-05 LAB
ALBUMIN SERPL-MCNC: 3.8 G/DL (ref 3.4–5)
ALBUMIN/GLOB SERPL: 1 {RATIO} (ref 1.1–2.2)
ALP SERPL-CCNC: 176 U/L (ref 40–129)
ALT SERPL-CCNC: 8 U/L (ref 10–40)
ANION GAP SERPL CALCULATED.3IONS-SCNC: 11 MMOL/L (ref 3–16)
AST SERPL-CCNC: 17 U/L (ref 15–37)
BILIRUB SERPL-MCNC: 0.7 MG/DL (ref 0–1)
BUN SERPL-MCNC: 26 MG/DL (ref 7–20)
CALCIUM SERPL-MCNC: 9 MG/DL (ref 8.3–10.6)
CHLORIDE SERPL-SCNC: 97 MMOL/L (ref 99–110)
CO2 SERPL-SCNC: 30 MMOL/L (ref 21–32)
CREAT SERPL-MCNC: 0.7 MG/DL (ref 0.6–1.2)
DEPRECATED RDW RBC AUTO: 13 % (ref 12.4–15.4)
GFR SERPLBLD CREATININE-BSD FMLA CKD-EPI: >60 ML/MIN/{1.73_M2}
HCT VFR BLD AUTO: 42.1 % (ref 36–48)
HGB BLD-MCNC: 14.6 G/DL (ref 12–16)
MCH RBC QN AUTO: 32.8 PG (ref 26–34)
MCHC RBC AUTO-ENTMCNC: 34.7 G/DL (ref 31–36)
MCV RBC AUTO: 94.5 FL (ref 80–100)
NT-PROBNP SERPL-MCNC: 354 PG/ML (ref 0–449)
PLATELET # BLD AUTO: 262 K/UL (ref 135–450)
PMV BLD AUTO: 8.6 FL (ref 5–10.5)
POTASSIUM SERPL-SCNC: 3.5 MMOL/L (ref 3.5–5.1)
PROT SERPL-MCNC: 7.5 G/DL (ref 6.4–8.2)
RBC # BLD AUTO: 4.46 M/UL (ref 4–5.2)
SODIUM SERPL-SCNC: 138 MMOL/L (ref 136–145)
TSH SERPL DL<=0.005 MIU/L-ACNC: 1.15 UIU/ML (ref 0.27–4.2)
WBC # BLD AUTO: 11.4 K/UL (ref 4–11)

## 2024-03-05 PROCEDURE — 80053 COMPREHEN METABOLIC PANEL: CPT

## 2024-03-05 PROCEDURE — 85027 COMPLETE CBC AUTOMATED: CPT

## 2024-03-05 PROCEDURE — 36415 COLL VENOUS BLD VENIPUNCTURE: CPT

## 2024-03-05 PROCEDURE — 84443 ASSAY THYROID STIM HORMONE: CPT

## 2024-03-05 PROCEDURE — 83880 ASSAY OF NATRIURETIC PEPTIDE: CPT

## 2024-03-06 ENCOUNTER — TELEPHONE (OUTPATIENT)
Dept: CARDIOLOGY CLINIC | Age: 79
End: 2024-03-06

## 2024-03-06 NOTE — TELEPHONE ENCOUNTER
----- Message from Warren Osullivan MD sent at 3/6/2024 12:45 PM EST -----  Labs look great  Continue current medications.

## 2024-03-11 ENCOUNTER — OFFICE VISIT (OUTPATIENT)
Dept: CARDIOLOGY CLINIC | Age: 79
End: 2024-03-11
Payer: MEDICARE

## 2024-03-11 VITALS
OXYGEN SATURATION: 95 % | DIASTOLIC BLOOD PRESSURE: 80 MMHG | SYSTOLIC BLOOD PRESSURE: 102 MMHG | RESPIRATION RATE: 22 BRPM | HEART RATE: 63 BPM | BODY MASS INDEX: 20.2 KG/M2 | HEIGHT: 63 IN | WEIGHT: 114 LBS

## 2024-03-11 DIAGNOSIS — Z79.899 ON AMIODARONE THERAPY: ICD-10-CM

## 2024-03-11 DIAGNOSIS — I50.42 CHRONIC COMBINED SYSTOLIC AND DIASTOLIC HEART FAILURE (HCC): ICD-10-CM

## 2024-03-11 DIAGNOSIS — J43.2 CENTRILOBULAR EMPHYSEMA (HCC): ICD-10-CM

## 2024-03-11 DIAGNOSIS — J44.9 CHRONIC OBSTRUCTIVE PULMONARY DISEASE, UNSPECIFIED COPD TYPE (HCC): ICD-10-CM

## 2024-03-11 DIAGNOSIS — I50.21 ACUTE SYSTOLIC CONGESTIVE HEART FAILURE (HCC): ICD-10-CM

## 2024-03-11 DIAGNOSIS — J90 PLEURAL EFFUSION: ICD-10-CM

## 2024-03-11 DIAGNOSIS — I31.39 PERICARDIAL EFFUSION: ICD-10-CM

## 2024-03-11 DIAGNOSIS — Z99.81 DEPENDENCE ON CONTINUOUS SUPPLEMENTAL OXYGEN: ICD-10-CM

## 2024-03-11 DIAGNOSIS — R06.02 SHORTNESS OF BREATH: ICD-10-CM

## 2024-03-11 DIAGNOSIS — I48.0 PAF (PAROXYSMAL ATRIAL FIBRILLATION) (HCC): Primary | ICD-10-CM

## 2024-03-11 DIAGNOSIS — Z79.01 ON CONTINUOUS ORAL ANTICOAGULATION: ICD-10-CM

## 2024-03-11 DIAGNOSIS — I50.22 CHRONIC SYSTOLIC HEART FAILURE (HCC): ICD-10-CM

## 2024-03-11 PROCEDURE — 99214 OFFICE O/P EST MOD 30 MIN: CPT | Performed by: INTERNAL MEDICINE

## 2024-03-11 PROCEDURE — 1123F ACP DISCUSS/DSCN MKR DOCD: CPT | Performed by: INTERNAL MEDICINE

## 2024-03-11 NOTE — PATIENT INSTRUCTIONS
Follow up with Dr Osullivan in 6 months     Call the office to clarify medication list.     Make sure you let your eye doctor know you are taking Amiodarone. Be careful in the sun as well.     Call for any questions or concerns.

## 2024-03-13 ENCOUNTER — TELEPHONE (OUTPATIENT)
Dept: CARDIOLOGY CLINIC | Age: 79
End: 2024-03-13

## 2024-03-13 NOTE — TELEPHONE ENCOUNTER
Pt called to give wak her list of meds that the Pt takes daily:    Eliquis 5mg  Jardiance 10mg  Metoprolol 50mg  Tosemide 20mg  Spironolactone 25mg  Trelergy-62.5-25 inhaler

## 2024-03-15 RX ORDER — AMIODARONE HYDROCHLORIDE 200 MG/1
200 TABLET ORAL DAILY
Qty: 90 TABLET | Refills: 3 | OUTPATIENT
Start: 2024-03-15

## 2024-03-15 NOTE — TELEPHONE ENCOUNTER
Please call pt and clarify number a doses daily of each medication, some are BID. Also, pt is to be on Amio and I do not see that listed in her call in note. Please clarify this as well. Thank you!

## 2024-03-15 NOTE — TELEPHONE ENCOUNTER
Eliquis 5 mg: taking BID  Jardiance 10 mg: taking once daily  Metoprolol 50 mg: taking BID  Tosemide 20 mg: taking BID  Spironolactone 25 mg: taking once daily   Trelergy-62.5-25 inhaler taking       Pt reports that she has never taken amiodarone. Please send to [facility] [address].   Order queued.

## 2024-04-16 RX ORDER — METOPROLOL TARTRATE 50 MG/1
50 TABLET, FILM COATED ORAL 2 TIMES DAILY
Qty: 60 TABLET | Refills: 3 | Status: SHIPPED | OUTPATIENT
Start: 2024-04-16

## 2024-04-16 NOTE — TELEPHONE ENCOUNTER
Refill request metoprolol tartrate (LOPRESSOR) 50 MG    Last OV:3/11/24 [facility]    Last EK23    Next Appt:24 [facility]    metoprolol tartrate (LOPRESSOR) 50 MG tablet [[id number]]    Order Details  Dose: 50 mg Route: Oral Frequency: 2 TIMES DAILY   Dispense Quantity: 60 tablet Refills: 3          Sig: Take 1 tablet by mouth 2 times daily         Start Date: 23 End Date: --   Written Date: 23 Expiration Date: 24   Original Order: metoprolol tartrate (LOPRESSOR) 50 MG tablet [[id number]]   Providers      Authorizing Provider: Warren Osullivan MD  NPI: [id number]   Ordering User: Warren Osullivan MD

## 2024-04-16 NOTE — TELEPHONE ENCOUNTER
Medication Refill    Medication needing refilled:metoprolol tartrate (LOPRESSOR) 50 MG - REQ 90 day supply    Dosage of the medication:    How are you taking this medication (QD, BID, TID, QID, PRN): 1 tablet by mouth 2 times daily     30 or 90 day supply called in: 90 day supply    When will you run out of your medication:    Which Pharmacy are we sending the medication to?: [id number] [place] [address] - P [phone] - F [phone]

## 2024-04-29 RX ORDER — FLUTICASONE FUROATE, UMECLIDINIUM BROMIDE AND VILANTEROL TRIFENATATE 100; 62.5; 25 UG/1; UG/1; UG/1
1 POWDER RESPIRATORY (INHALATION) DAILY
Qty: 180 EACH | Refills: 2 | Status: SHIPPED | OUTPATIENT
Start: 2024-04-29

## 2024-05-02 ENCOUNTER — OFFICE VISIT (OUTPATIENT)
Dept: PULMONOLOGY | Age: 79
End: 2024-05-02
Payer: MEDICARE

## 2024-05-02 VITALS
OXYGEN SATURATION: 96 % | DIASTOLIC BLOOD PRESSURE: 68 MMHG | SYSTOLIC BLOOD PRESSURE: 106 MMHG | BODY MASS INDEX: 20.2 KG/M2 | WEIGHT: 114 LBS | HEART RATE: 52 BPM | HEIGHT: 63 IN

## 2024-05-02 DIAGNOSIS — J44.9 COPD, SEVERE (HCC): ICD-10-CM

## 2024-05-02 DIAGNOSIS — Z87.891 PERSONAL HISTORY OF TOBACCO USE: Primary | ICD-10-CM

## 2024-05-02 PROCEDURE — 99213 OFFICE O/P EST LOW 20 MIN: CPT | Performed by: INTERNAL MEDICINE

## 2024-05-02 PROCEDURE — 1123F ACP DISCUSS/DSCN MKR DOCD: CPT | Performed by: INTERNAL MEDICINE

## 2024-05-02 PROCEDURE — G0296 VISIT TO DETERM LDCT ELIG: HCPCS | Performed by: INTERNAL MEDICINE

## 2024-05-02 ASSESSMENT — ENCOUNTER SYMPTOMS
SINUS PRESSURE: 0
APNEA: 0
ABDOMINAL PAIN: 0
SORE THROAT: 0
DIARRHEA: 0
CONSTIPATION: 0
VOMITING: 0
RHINORRHEA: 0
WHEEZING: 0
COLOR CHANGE: 0
COUGH: 0
CHOKING: 0
STRIDOR: 0
BLOOD IN STOOL: 0
ABDOMINAL DISTENTION: 0
CHEST TIGHTNESS: 0
SHORTNESS OF BREATH: 1
BACK PAIN: 0
VOICE CHANGE: 0

## 2024-05-02 NOTE — PROGRESS NOTES
Discussed with the patient the current USPSTF guidelines released March 9, 2021 for screening for lung cancer.    For adults aged 50 to 80 years who have a 20 pack-year smoking history and currently smoke or have quit within the past 15 years the grade B recommendation is to:  Screen for lung cancer with low-dose computed tomography (LDCT) every year.  Stop screening once a person has not smoked for 15 years or has a health problem that limits life expectancy or the ability to have lung surgery.    The patient  reports that she quit smoking about 9 months ago. Her smoking use included cigarettes. She started smoking about 50 years ago. She has a 50.0 pack-year smoking history. She has been exposed to tobacco smoke. She has never used smokeless tobacco.. Discussed with patient the risks and benefits of screening, including over-diagnosis, false positive rate, and total radiation exposure.  The patient currently exhibits no signs or symptoms suggestive of lung cancer.  Discussed with patient the importance of compliance with yearly annual lung cancer screenings and willingness to undergo diagnosis and treatment if screening scan is positive.  In addition, the patient was counseled regarding the importance of remaining smoke free and/or total smoking cessation.    Also reviewed the following if the patient has Medicare that as of February 10, 2022, Medicare only covers LDCT screening in patients aged 50-77 with at least a 20 pack-year smoking history who currently smoke or have quit in the last 15 years  
in July which we will request today.    Return in about 4 months (around 9/2/2024).

## 2024-07-02 ENCOUNTER — HOSPITAL ENCOUNTER (OUTPATIENT)
Dept: CT IMAGING | Age: 79
Discharge: HOME | End: 2024-07-02
Attending: INTERNAL MEDICINE
Payer: MEDICARE

## 2024-07-02 ENCOUNTER — HOSPITAL ENCOUNTER (OUTPATIENT)
Dept: CT IMAGING | Age: 79
Discharge: HOME | End: 2024-07-02
Attending: INTERNAL MEDICINE

## 2024-07-02 DIAGNOSIS — Z87.891 PERSONAL HISTORY OF TOBACCO USE: ICD-10-CM

## 2024-07-02 PROCEDURE — 71271 CT THORAX LUNG CANCER SCR C-: CPT

## 2024-08-12 RX ORDER — METOPROLOL TARTRATE 50 MG/1
50 TABLET, FILM COATED ORAL 2 TIMES DAILY
Qty: 180 TABLET | Refills: 3 | Status: SHIPPED | OUTPATIENT
Start: 2024-08-12

## 2024-08-13 RX ORDER — TORSEMIDE 20 MG/1
20 TABLET ORAL 2 TIMES DAILY
Qty: 180 TABLET | Refills: 3 | Status: SHIPPED | OUTPATIENT
Start: 2024-08-13

## 2024-08-13 NOTE — TELEPHONE ENCOUNTER
Medication Refill    Medication needing refilled:  torsemide (DEMADEX) 20 MG tablet   Dosage of the medication:   40 MG  How are you taking this medication (QD, BID, TID, QID, PRN):   Take 1 tablet BID  30 or 90 day supply called in:   90 day    Which Pharmacy are we sending the medication to?:   [id number] - [place], [place] - [address] - P [phone] - F [phone] [address] [facility] [address]  Phone: [phone]  Fax: [phone]

## 2024-08-13 NOTE — TELEPHONE ENCOUNTER
Received refill request for Torsemide from [facility] pharmacy.    Last ov:03/11/2024 MADINA    Last labs:03/05/2024 CMP    Last Refill:08/25/2023    Next appointment:09/16/2024 MADINA

## 2024-09-03 ENCOUNTER — OFFICE VISIT (OUTPATIENT)
Dept: PULMONOLOGY | Age: 79
End: 2024-09-03
Payer: MEDICARE

## 2024-09-03 VITALS
HEART RATE: 70 BPM | BODY MASS INDEX: 21.26 KG/M2 | DIASTOLIC BLOOD PRESSURE: 68 MMHG | HEIGHT: 63 IN | SYSTOLIC BLOOD PRESSURE: 110 MMHG | WEIGHT: 120 LBS | OXYGEN SATURATION: 97 %

## 2024-09-03 DIAGNOSIS — J44.9 COPD, SEVERE (HCC): Primary | ICD-10-CM

## 2024-09-03 PROCEDURE — 99213 OFFICE O/P EST LOW 20 MIN: CPT | Performed by: INTERNAL MEDICINE

## 2024-09-03 PROCEDURE — 1123F ACP DISCUSS/DSCN MKR DOCD: CPT | Performed by: INTERNAL MEDICINE

## 2024-09-03 ASSESSMENT — ENCOUNTER SYMPTOMS
SINUS PRESSURE: 0
ABDOMINAL DISTENTION: 0
SHORTNESS OF BREATH: 1
COLOR CHANGE: 0
RHINORRHEA: 0
VOMITING: 0
VOICE CHANGE: 0
WHEEZING: 0
BLOOD IN STOOL: 0
BACK PAIN: 0
COUGH: 0
DIARRHEA: 0
SORE THROAT: 0
STRIDOR: 0
CONSTIPATION: 0
ABDOMINAL PAIN: 0
CHEST TIGHTNESS: 0
CHOKING: 0
APNEA: 0

## 2024-09-03 NOTE — PROGRESS NOTES
Tenderness: There is no abdominal tenderness. There is no guarding or rebound.   Musculoskeletal:         General: No swelling, tenderness or deformity.   Lymphadenopathy:      Cervical: No cervical adenopathy.   Skin:     Coloration: Skin is not pale.      Findings: No erythema, lesion or rash.   Neurological:      Mental Status: She is alert and oriented to person, place, and time. Mental status is at baseline.      Motor: No abnormal muscle tone.         Health Maintenance   Topic Date Due    Pneumococcal 65+ years Vaccine (1 of 2 - PCV) Never done    Depression Screen  Never done    Hepatitis C screen  Never done    DTaP/Tdap/Td vaccine (1 - Tdap) Never done    Shingles vaccine (1 of 2) Never done    DEXA (modify frequency per FRAX score)  Never done    Respiratory Syncytial Virus (RSV) Pregnant or age 60 yrs+ (1 - 1-dose 60+ series) Never done    Annual Wellness Visit (Medicare Advantage)  Never done    Flu vaccine (1) Never done    COVID-19 Vaccine (1 - 2023-24 season) Never done    Lung Cancer Screening &/or Counseling  07/02/2025    Hepatitis A vaccine  Aged Out    Hepatitis B vaccine  Aged Out    Hib vaccine  Aged Out    Polio vaccine  Aged Out    Meningococcal (ACWY) vaccine  Aged Out        Assessment/Plan:    COPD, severe with PFT from August 2023 showing FEV1 of 1 L [50% predicted], continue with Trelegy 100 and albuterol inhaler as needed.  No recent exacerbations of COPD.  Patient quit smoking cigarettes approximately 1 year ago.    LDCT from 7/2 was reviewed, no evidence of significant lung nodules-continue with annual screening CT imaging.    Return in about 6 months (around 3/3/2025).

## 2024-09-16 ENCOUNTER — OFFICE VISIT (OUTPATIENT)
Dept: CARDIOLOGY CLINIC | Age: 79
End: 2024-09-16
Payer: MEDICARE

## 2024-09-16 ENCOUNTER — HOSPITAL ENCOUNTER (OUTPATIENT)
Age: 79
Discharge: HOME | End: 2024-09-16
Payer: MEDICARE

## 2024-09-16 VITALS
HEIGHT: 63 IN | BODY MASS INDEX: 21.4 KG/M2 | WEIGHT: 120.8 LBS | OXYGEN SATURATION: 99 % | HEART RATE: 53 BPM | SYSTOLIC BLOOD PRESSURE: 92 MMHG | DIASTOLIC BLOOD PRESSURE: 52 MMHG

## 2024-09-16 DIAGNOSIS — J43.2 CENTRILOBULAR EMPHYSEMA (HCC): ICD-10-CM

## 2024-09-16 DIAGNOSIS — Z99.81 DEPENDENCE ON CONTINUOUS SUPPLEMENTAL OXYGEN: ICD-10-CM

## 2024-09-16 DIAGNOSIS — I48.19 PERSISTENT ATRIAL FIBRILLATION (HCC): Primary | ICD-10-CM

## 2024-09-16 DIAGNOSIS — I50.42 CHRONIC COMBINED SYSTOLIC AND DIASTOLIC HEART FAILURE (HCC): ICD-10-CM

## 2024-09-16 DIAGNOSIS — Z79.899 ON AMIODARONE THERAPY: ICD-10-CM

## 2024-09-16 DIAGNOSIS — J44.9 CHRONIC OBSTRUCTIVE PULMONARY DISEASE, UNSPECIFIED COPD TYPE (HCC): ICD-10-CM

## 2024-09-16 DIAGNOSIS — Z79.01 ON CONTINUOUS ORAL ANTICOAGULATION: ICD-10-CM

## 2024-09-16 DIAGNOSIS — I50.22 CHRONIC SYSTOLIC HEART FAILURE (HCC): ICD-10-CM

## 2024-09-16 LAB
ALBUMIN SERPL-MCNC: 3.8 G/DL (ref 3.4–5)
ALP SERPL-CCNC: 161 U/L (ref 40–129)
ALT SERPL-CCNC: 9 U/L (ref 10–40)
AST SERPL-CCNC: 21 U/L (ref 15–37)
BILIRUB DIRECT SERPL-MCNC: 0.3 MG/DL (ref 0–0.3)
BILIRUB INDIRECT SERPL-MCNC: 0.4 MG/DL (ref 0–1)
BILIRUB SERPL-MCNC: 0.7 MG/DL (ref 0–1)
PROT SERPL-MCNC: 7.2 G/DL (ref 6.4–8.2)
TSH SERPL DL<=0.005 MIU/L-ACNC: 1.24 UIU/ML (ref 0.27–4.2)

## 2024-09-16 PROCEDURE — 84443 ASSAY THYROID STIM HORMONE: CPT

## 2024-09-16 PROCEDURE — 99214 OFFICE O/P EST MOD 30 MIN: CPT | Performed by: INTERNAL MEDICINE

## 2024-09-16 PROCEDURE — 36415 COLL VENOUS BLD VENIPUNCTURE: CPT

## 2024-09-16 PROCEDURE — 93000 ELECTROCARDIOGRAM COMPLETE: CPT | Performed by: INTERNAL MEDICINE

## 2024-09-16 PROCEDURE — 1123F ACP DISCUSS/DSCN MKR DOCD: CPT | Performed by: INTERNAL MEDICINE

## 2024-09-16 PROCEDURE — G2211 COMPLEX E/M VISIT ADD ON: HCPCS | Performed by: INTERNAL MEDICINE

## 2024-09-16 PROCEDURE — 80076 HEPATIC FUNCTION PANEL: CPT

## 2024-09-17 ENCOUNTER — TELEPHONE (OUTPATIENT)
Dept: CARDIOLOGY CLINIC | Age: 79
End: 2024-09-17

## 2024-09-17 RX ORDER — AMIODARONE HYDROCHLORIDE 200 MG/1
200 TABLET ORAL DAILY
Qty: 90 TABLET | Refills: 1 | Status: SHIPPED | OUTPATIENT
Start: 2024-09-17

## 2024-09-25 DIAGNOSIS — I50.22 CHRONIC SYSTOLIC HEART FAILURE (HCC): ICD-10-CM

## 2024-09-25 RX ORDER — SPIRONOLACTONE 25 MG/1
25 TABLET ORAL DAILY
Qty: 90 TABLET | Refills: 3 | Status: SHIPPED | OUTPATIENT
Start: 2024-09-25

## 2024-10-02 DIAGNOSIS — I50.22 CHRONIC SYSTOLIC HEART FAILURE (HCC): ICD-10-CM

## 2024-10-02 NOTE — TELEPHONE ENCOUNTER
Received refill request for Vane MONTEMAYOR from [facility] pharmacy.     Last OV: 09/16/24    Next OV: None    Last Labs: 08/03/23    Last Filled: 06/28/24

## 2024-12-24 ENCOUNTER — APPOINTMENT (OUTPATIENT)
Dept: GENERAL RADIOLOGY | Age: 79
DRG: 193 | End: 2024-12-24
Payer: MEDICARE

## 2024-12-24 ENCOUNTER — APPOINTMENT (OUTPATIENT)
Dept: CT IMAGING | Age: 79
DRG: 193 | End: 2024-12-24
Payer: MEDICARE

## 2024-12-24 ENCOUNTER — HOSPITAL ENCOUNTER (INPATIENT)
Age: 79
LOS: 7 days | DRG: 193 | End: 2024-12-31
Attending: EMERGENCY MEDICINE | Admitting: STUDENT IN AN ORGANIZED HEALTH CARE EDUCATION/TRAINING PROGRAM
Payer: MEDICARE

## 2024-12-24 DIAGNOSIS — R79.89 ABNORMAL LFTS: ICD-10-CM

## 2024-12-24 DIAGNOSIS — J44.1 COPD EXACERBATION (HCC): ICD-10-CM

## 2024-12-24 DIAGNOSIS — N17.9 AKI (ACUTE KIDNEY INJURY) (HCC): ICD-10-CM

## 2024-12-24 DIAGNOSIS — T79.6XXA TRAUMATIC RHABDOMYOLYSIS, INITIAL ENCOUNTER (HCC): ICD-10-CM

## 2024-12-24 DIAGNOSIS — J11.1 INFLUENZA: ICD-10-CM

## 2024-12-24 DIAGNOSIS — I71.40 ABDOMINAL AORTIC ANEURYSM (AAA) 3.0 CM TO 5.0 CM IN DIAMETER IN FEMALE (HCC): ICD-10-CM

## 2024-12-24 DIAGNOSIS — R06.02 SHORTNESS OF BREATH: ICD-10-CM
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
## 2024-12-24 PROBLEM — R53.1 GENERALIZED WEAKNESS: Status: ACTIVE | Noted: 2024-12-24

## 2024-12-24 LAB
ALBUMIN SERPL-MCNC: 3.4 G/DL (ref 3.4–5)
ALBUMIN/GLOB SERPL: 0.9 {RATIO} (ref 1.1–2.2)
ALP SERPL-CCNC: 143 U/L (ref 40–129)
ALT SERPL-CCNC: 171 U/L (ref 10–40)
ANION GAP SERPL CALCULATED.3IONS-SCNC: 18 MMOL/L (ref 3–16)
AST SERPL-CCNC: 217 U/L (ref 15–37)
BACTERIA URNS QL MICRO: ABNORMAL /HPF
BASE EXCESS BLDV CALC-SCNC: -5.2 MMOL/L (ref -3–3)
BASOPHILS # BLD: 0 K/UL (ref 0–0.2)
BASOPHILS NFR BLD: 0.2 %
BILIRUB SERPL-MCNC: 0.6 MG/DL (ref 0–1)
BILIRUB UR QL STRIP.AUTO: NEGATIVE
BUN SERPL-MCNC: 42 MG/DL (ref 7–20)
CALCIUM SERPL-MCNC: 8.4 MG/DL (ref 8.3–10.6)
CHLORIDE SERPL-SCNC: 99 MMOL/L (ref 99–110)
CK SERPL-CCNC: 1261 U/L (ref 26–192)
CLARITY UR: ABNORMAL
CO2 BLDV-SCNC: 46 MMOL/L
CO2 SERPL-SCNC: 21 MMOL/L (ref 21–32)
COHGB MFR BLDV: 3.5 % (ref 0–1.5)
COLOR UR: YELLOW
CREAT SERPL-MCNC: 1.4 MG/DL (ref 0.6–1.2)
DEPRECATED RDW RBC AUTO: 13.5 % (ref 12.4–15.4)
DO-HGB MFR BLDV: 3 %
EOSINOPHIL # BLD: 0 K/UL (ref 0–0.6)
EOSINOPHIL NFR BLD: 0 %
EPI CELLS #/AREA URNS AUTO: 3 /HPF (ref 0–5)
FLUAV RNA RESP QL NAA+PROBE: DETECTED
FLUBV RNA RESP QL NAA+PROBE: NOT DETECTED
GFR SERPLBLD CREATININE-BSD FMLA CKD-EPI: 39 ML/MIN/{1.73_M2}
GLUCOSE SERPL-MCNC: 117 MG/DL (ref 70–99)
GLUCOSE UR STRIP.AUTO-MCNC: 100 MG/DL
HCO3 BLDV-SCNC: 19.4 MMOL/L (ref 23–29)
HCT VFR BLD AUTO: 46 % (ref 36–48)
HGB BLD-MCNC: 15.6 G/DL (ref 12–16)
HGB UR QL STRIP.AUTO: ABNORMAL
HYALINE CASTS #/AREA URNS AUTO: 14 /LPF (ref 0–8)
KETONES UR STRIP.AUTO-MCNC: NEGATIVE MG/DL
LACTATE BLDV-SCNC: 1.3 MMOL/L (ref 0.4–2)
LACTATE BLDV-SCNC: 3.2 MMOL/L (ref 0.4–1.9)
LEUKOCYTE ESTERASE UR QL STRIP.AUTO: NEGATIVE
LIPASE SERPL-CCNC: 112 U/L (ref 13–60)
LYMPHOCYTES # BLD: 0.7 K/UL (ref 1–5.1)
LYMPHOCYTES NFR BLD: 11.4 %
MAGNESIUM SERPL-MCNC: 2.11 MG/DL (ref 1.8–2.4)
MCH RBC QN AUTO: 32.4 PG (ref 26–34)
MCHC RBC AUTO-ENTMCNC: 33.9 G/DL (ref 31–36)
MCV RBC AUTO: 95.4 FL (ref 80–100)
METHGB MFR BLDV: 0.7 %
MONOCYTES # BLD: 0.7 K/UL (ref 0–1.3)
MONOCYTES NFR BLD: 11.2 %
NEUTROPHILS # BLD: 4.9 K/UL (ref 1.7–7.7)
NEUTROPHILS NFR BLD: 77.2 %
NITRITE UR QL STRIP.AUTO: NEGATIVE
NT-PROBNP SERPL-MCNC: 1549 PG/ML (ref 0–449)
O2 CT VFR BLDV CALC: 19 VOL %
O2 THERAPY: ABNORMAL
PCO2 BLDV: 34.1 MMHG (ref 40–50)
PH BLDV: 7.36 [PH] (ref 7.35–7.45)
PH UR STRIP.AUTO: 5.5 [PH] (ref 5–8)
PLATELET # BLD AUTO: 177 K/UL (ref 135–450)
PMV BLD AUTO: 8.2 FL (ref 5–10.5)
PO2 BLDV: 95.7 MMHG (ref 25–40)
POTASSIUM SERPL-SCNC: 2.8 MMOL/L (ref 3.5–5.1)
PROT SERPL-MCNC: 7 G/DL (ref 6.4–8.2)
PROT UR STRIP.AUTO-MCNC: ABNORMAL MG/DL
RBC # BLD AUTO: 4.83 M/UL (ref 4–5.2)
RBC CLUMPS #/AREA URNS AUTO: 5 /HPF (ref 0–4)
REASON FOR REJECTION: NORMAL
REJECTED TEST: NORMAL
SAO2 % BLDV: 97 %
SARS-COV-2 RNA RESP QL NAA+PROBE: NOT DETECTED
SODIUM SERPL-SCNC: 138 MMOL/L (ref 136–145)
SP GR UR STRIP.AUTO: 1.01 (ref 1–1.03)
TROPONIN, HIGH SENSITIVITY: 38 NG/L (ref 0–14)
TROPONIN, HIGH SENSITIVITY: 40 NG/L (ref 0–14)
UA COMPLETE W REFLEX CULTURE PNL UR: ABNORMAL
UA DIPSTICK W REFLEX MICRO PNL UR: YES
URN SPEC COLLECT METH UR: ABNORMAL
UROBILINOGEN UR STRIP-ACNC: 0.2 E.U./DL
WBC # BLD AUTO: 6.4 K/UL (ref 4–11)
WBC #/AREA URNS AUTO: 1 /HPF (ref 0–5)

## 2024-12-24 PROCEDURE — 84300 ASSAY OF URINE SODIUM: CPT

## 2024-12-24 PROCEDURE — 70450 CT HEAD/BRAIN W/O DYE: CPT

## 2024-12-24 PROCEDURE — 2700000000 HC OXYGEN THERAPY PER DAY

## 2024-12-24 PROCEDURE — 6360000002 HC RX W HCPCS: Performed by: EMERGENCY MEDICINE

## 2024-12-24 PROCEDURE — 83880 ASSAY OF NATRIURETIC PEPTIDE: CPT

## 2024-12-24 PROCEDURE — 83690 ASSAY OF LIPASE: CPT

## 2024-12-24 PROCEDURE — 6370000000 HC RX 637 (ALT 250 FOR IP): Performed by: EMERGENCY MEDICINE

## 2024-12-24 PROCEDURE — 73502 X-RAY EXAM HIP UNI 2-3 VIEWS: CPT

## 2024-12-24 PROCEDURE — 71045 X-RAY EXAM CHEST 1 VIEW: CPT

## 2024-12-24 PROCEDURE — 82570 ASSAY OF URINE CREATININE: CPT

## 2024-12-24 PROCEDURE — 36415 COLL VENOUS BLD VENIPUNCTURE: CPT

## 2024-12-24 PROCEDURE — 71250 CT THORAX DX C-: CPT

## 2024-12-24 PROCEDURE — 94761 N-INVAS EAR/PLS OXIMETRY MLT: CPT

## 2024-12-24 PROCEDURE — 83605 ASSAY OF LACTIC ACID: CPT

## 2024-12-24 PROCEDURE — 81001 URINALYSIS AUTO W/SCOPE: CPT

## 2024-12-24 PROCEDURE — 85025 COMPLETE CBC W/AUTO DIFF WBC: CPT

## 2024-12-24 PROCEDURE — 80053 COMPREHEN METABOLIC PANEL: CPT

## 2024-12-24 PROCEDURE — 83735 ASSAY OF MAGNESIUM: CPT

## 2024-12-24 PROCEDURE — 82550 ASSAY OF CK (CPK): CPT

## 2024-12-24 PROCEDURE — 74176 CT ABD & PELVIS W/O CONTRAST: CPT

## 2024-12-24 PROCEDURE — 99285 EMERGENCY DEPT VISIT HI MDM: CPT

## 2024-12-24 PROCEDURE — 84484 ASSAY OF TROPONIN QUANT: CPT

## 2024-12-24 PROCEDURE — 96374 THER/PROPH/DIAG INJ IV PUSH: CPT

## 2024-12-24 PROCEDURE — 1200000000 HC SEMI PRIVATE

## 2024-12-24 PROCEDURE — 94640 AIRWAY INHALATION TREATMENT: CPT

## 2024-12-24 PROCEDURE — 87636 SARSCOV2 & INF A&B AMP PRB: CPT

## 2024-12-24 PROCEDURE — 6370000000 HC RX 637 (ALT 250 FOR IP): Performed by: STUDENT IN AN ORGANIZED HEALTH CARE EDUCATION/TRAINING PROGRAM

## 2024-12-24 PROCEDURE — 82803 BLOOD GASES ANY COMBINATION: CPT

## 2024-12-24 PROCEDURE — 93005 ELECTROCARDIOGRAM TRACING: CPT | Performed by: EMERGENCY MEDICINE

## 2024-12-24 PROCEDURE — 2580000003 HC RX 258: Performed by: EMERGENCY MEDICINE

## 2024-12-24 PROCEDURE — 2500000003 HC RX 250 WO HCPCS: Performed by: EMERGENCY MEDICINE

## 2024-12-24 RX ORDER — TORSEMIDE 20 MG/1
20 TABLET ORAL 2 TIMES DAILY
Status: DISCONTINUED | OUTPATIENT
Start: 2024-12-24 | End: 2024-12-31 | Stop reason: HOSPADM

## 2024-12-24 RX ORDER — IPRATROPIUM BROMIDE AND ALBUTEROL SULFATE 2.5; .5 MG/3ML; MG/3ML
1 SOLUTION RESPIRATORY (INHALATION) ONCE
Status: COMPLETED | OUTPATIENT
Start: 2024-12-24 | End: 2024-12-24

## 2024-12-24 RX ORDER — SPIRONOLACTONE 25 MG/1
25 TABLET ORAL DAILY
Status: DISCONTINUED | OUTPATIENT
Start: 2024-12-25 | End: 2024-12-31 | Stop reason: HOSPADM

## 2024-12-24 RX ORDER — BUDESONIDE AND FORMOTEROL FUMARATE DIHYDRATE 160; 4.5 UG/1; UG/1
2 AEROSOL RESPIRATORY (INHALATION)
Status: DISCONTINUED | OUTPATIENT
Start: 2024-12-25 | End: 2024-12-26

## 2024-12-24 RX ORDER — ENOXAPARIN SODIUM 100 MG/ML
40 INJECTION SUBCUTANEOUS DAILY
Status: DISCONTINUED | OUTPATIENT
Start: 2024-12-25 | End: 2024-12-24

## 2024-12-24 RX ORDER — POTASSIUM CHLORIDE 750 MG/1
40 TABLET, EXTENDED RELEASE ORAL ONCE
Status: COMPLETED | OUTPATIENT
Start: 2024-12-24 | End: 2024-12-24

## 2024-12-24 RX ORDER — PREDNISONE 20 MG/1
20 TABLET ORAL DAILY
Status: DISCONTINUED | OUTPATIENT
Start: 2024-12-25 | End: 2024-12-25

## 2024-12-24 RX ORDER — POTASSIUM CHLORIDE 7.45 MG/ML
10 INJECTION INTRAVENOUS
Status: COMPLETED | OUTPATIENT
Start: 2024-12-24 | End: 2024-12-25

## 2024-12-24 RX ORDER — IPRATROPIUM BROMIDE AND ALBUTEROL SULFATE 2.5; .5 MG/3ML; MG/3ML
1 SOLUTION RESPIRATORY (INHALATION) EVERY 4 HOURS PRN
Status: DISCONTINUED | OUTPATIENT
Start: 2024-12-24 | End: 2024-12-31 | Stop reason: HOSPADM

## 2024-12-24 RX ORDER — BUDESONIDE AND FORMOTEROL FUMARATE DIHYDRATE 160; 4.5 UG/1; UG/1
2 AEROSOL RESPIRATORY (INHALATION)
Status: DISCONTINUED | OUTPATIENT
Start: 2024-12-24 | End: 2024-12-24

## 2024-12-24 RX ORDER — OSELTAMIVIR PHOSPHATE 75 MG/1
75 CAPSULE ORAL ONCE
Status: COMPLETED | OUTPATIENT
Start: 2024-12-24 | End: 2024-12-24

## 2024-12-24 RX ORDER — METOPROLOL TARTRATE 50 MG
50 TABLET ORAL 2 TIMES DAILY
Status: DISCONTINUED | OUTPATIENT
Start: 2024-12-24 | End: 2024-12-31 | Stop reason: HOSPADM

## 2024-12-24 RX ORDER — AMIODARONE HYDROCHLORIDE 200 MG/1
200 TABLET ORAL DAILY
Status: DISCONTINUED | OUTPATIENT
Start: 2024-12-25 | End: 2024-12-31 | Stop reason: HOSPADM

## 2024-12-24 RX ORDER — 0.9 % SODIUM CHLORIDE 0.9 %
1000 INTRAVENOUS SOLUTION INTRAVENOUS ONCE
Status: COMPLETED | OUTPATIENT
Start: 2024-12-24 | End: 2024-12-24

## 2024-12-24 RX ADMIN — POTASSIUM CHLORIDE 40 MEQ: 750 TABLET, FILM COATED, EXTENDED RELEASE ORAL at 20:55

## 2024-12-24 RX ADMIN — IPRATROPIUM BROMIDE AND ALBUTEROL SULFATE 1 DOSE: .5; 3 SOLUTION RESPIRATORY (INHALATION) at 18:01

## 2024-12-24 RX ADMIN — POTASSIUM CHLORIDE 10 MEQ: 7.45 INJECTION INTRAVENOUS at 23:24

## 2024-12-24 RX ADMIN — SODIUM CHLORIDE 1000 ML: 9 INJECTION, SOLUTION INTRAVENOUS at 20:35

## 2024-12-24 RX ADMIN — WATER 125 MG: 1 INJECTION INTRAMUSCULAR; INTRAVENOUS; SUBCUTANEOUS at 18:04

## 2024-12-24 RX ADMIN — OSELTAMIVIR PHOSPHATE 75 MG: 75 CAPSULE ORAL at 18:50

## 2024-12-24 RX ADMIN — POTASSIUM CHLORIDE 10 MEQ: 7.45 INJECTION INTRAVENOUS at 22:20

## 2024-12-24 ASSESSMENT — LIFESTYLE VARIABLES
HOW MANY STANDARD DRINKS CONTAINING ALCOHOL DO YOU HAVE ON A TYPICAL DAY: PATIENT DOES NOT DRINK
HOW OFTEN DO YOU HAVE A DRINK CONTAINING ALCOHOL: NEVER

## 2024-12-24 ASSESSMENT — PAIN - FUNCTIONAL ASSESSMENT: PAIN_FUNCTIONAL_ASSESSMENT: 0-10

## 2024-12-24 NOTE — ED PROVIDER NOTES
[facility] Emergency Department      Pt Name: Vane Martini  MRN: [id number]  Birthdate 1945  Date of evaluation: 12/24/2024  Provider: MARGARITA MONTELONGO MD  CHIEF COMPLAINT  Chief Complaint   Patient presents with    Fall     [place] ems from home due to unwitnessed fall at home, per EMS, pt was on the ground fo 2 hours. Per pt, she tripped and fell. Denies LOC. Also, per EMS, pt was SOB upon arrival with O2 in the 60s, pt was given duoneb en route. Pt alert and oriented during triage      HPI  Vane Martini is a 79 y.o. female who presents because of a fall.  She tripped on something while she was try to go to the door to unlock it.  She was unable to get up without help.  Family arrived about 2 hours later because today is her birthday.  They called the EMS.  She denies any injury from the fall but does report her left hip has been giving her some pain for a few days.  She does not think she hit her head although she is anticoagulated with Eliquis.  Oxygen saturations noted to be in the 60s by EMS and given a DuoNeb.  She indicates her cough has been nonproductive.  She denies any known fever.  She denies any chest or abdominal pain.  She has had some symptoms with diarrhea for about 3 days since Sunday.  She does not typically wear oxygen.    REVIEW OF SYSTEMS:  LOC absent, breathing difficulty, no abdominal pain Pertinent positives and negatives as per the HPI.  All other pertinent review of systems reviewed and negative.  Nursing notes reviewed.    PAST MEDICAL HISTORY  Past Medical History:   Diagnosis Date    Abnormal weight loss     Chronic combined systolic and diastolic heart failure (HCC)     COPD with emphysema (HCC)     PAF (paroxysmal atrial fibrillation) (HCC)     Pericardial effusion      SURGICAL HISTORY  History reviewed. No pertinent surgical history.  MEDICATIONS:  No current facility-administered medications on file prior to encounter.     Current Outpatient Medications on File Prior  nebulizer solution 1 Dose (1 Dose Inhalation Given 12/25/24 1118)   oseltamivir (TAMIFLU) capsule 30 mg (30 mg Oral Given 12/25/24 1234)   0.9 % sodium chloride infusion (has no administration in time range)   ipratropium 0.5 mg-albuterol 2.5 mg (DUONEB) nebulizer solution 1 Dose (1 Dose Inhalation Given 12/24/24 1801)   methylPREDNISolone sodium succ (SOLU-MEDROL) 125 mg in sterile water 2 mL injection (125 mg IntraVENous Given 12/24/24 1804)   oseltamivir (TAMIFLU) capsule 75 mg (75 mg Oral Given 12/24/24 1850)   sodium chloride 0.9 % bolus 1,000 mL (1,000 mLs IntraVENous New Bag 12/24/24 2035)   potassium chloride 10 mEq/100 mL IVPB (Peripheral Line) (10 mEq IntraVENous New Bag 12/25/24 0023)   potassium chloride (KLOR-CON) extended release tablet 40 mEq (40 mEq Oral Given 12/24/24 2055)     Vitals:    12/25/24 0016 12/25/24 0420 12/25/24 1015 12/25/24 1147   BP: (!) 95/59 99/67 101/65 93/61   Pulse: 55 54 58 54   Resp: 20 20 18 18   Temp: 97 °F (36.1 °C) 97.9 °F (36.6 °C)  97.9 °F (36.6 °C)   TempSrc: Oral Oral  Oral   SpO2: 91% 91% 92% 93%   Weight:  56.2 kg (123 lb 14.4 oz)     Height:         History from:  Patient  Limitations to history:  None  Chronic Conditions:  has a past medical history of Abnormal weight loss, Chronic combined systolic and diastolic heart failure (HCC), COPD with emphysema (HCC), PAF (paroxysmal atrial fibrillation) (HCC), and Pericardial effusion.  Records Reviewed:  Outpatient notes  Disposition Considerations (Tests not ordered but considered, Shared Decision Making, Pt Expectation of Test or Tx.):  MR imaging not deemed clinically necessary in the ED setting    Is this patient to be included in the SEP-1 Core Measure due to severe sepsis or septic shock?   No   Exclusion criteria - the patient is NOT to be included for SEP-1 Core Measure due to:  Viral etiology found or highly suspected (including COVID-19) without concomitant bacterial infection    PROCEDURES:  None    CRITICAL

## 2024-12-25 LAB
ALBUMIN SERPL-MCNC: 2.8 G/DL (ref 3.4–5)
ALBUMIN/GLOB SERPL: 0.8 {RATIO} (ref 1.1–2.2)
ALP SERPL-CCNC: 116 U/L (ref 40–129)
ALT SERPL-CCNC: 150 U/L (ref 10–40)
ANION GAP SERPL CALCULATED.3IONS-SCNC: 14 MMOL/L (ref 3–16)
AST SERPL-CCNC: 163 U/L (ref 15–37)
BASOPHILS # BLD: 0 K/UL (ref 0–0.2)
BASOPHILS NFR BLD: 0.1 %
BILIRUB SERPL-MCNC: 0.4 MG/DL (ref 0–1)
BUN SERPL-MCNC: 39 MG/DL (ref 7–20)
CALCIUM SERPL-MCNC: 8.1 MG/DL (ref 8.3–10.6)
CHLORIDE SERPL-SCNC: 104 MMOL/L (ref 99–110)
CK SERPL-CCNC: 1178 U/L (ref 26–192)
CO2 SERPL-SCNC: 19 MMOL/L (ref 21–32)
CREAT SERPL-MCNC: 1.4 MG/DL (ref 0.6–1.2)
CREAT UR-MCNC: 94.5 MG/DL (ref 28–259)
DEPRECATED RDW RBC AUTO: 13.6 % (ref 12.4–15.4)
EKG ATRIAL RATE: 60 BPM
EKG DIAGNOSIS: NORMAL
EKG P AXIS: 27 DEGREES
EKG P-R INTERVAL: 134 MS
EKG Q-T INTERVAL: 510 MS
EKG QRS DURATION: 126 MS
EKG QTC CALCULATION (BAZETT): 510 MS
EKG R AXIS: 49 DEGREES
EKG T AXIS: 87 DEGREES
EKG VENTRICULAR RATE: 60 BPM
EOSINOPHIL # BLD: 0 K/UL (ref 0–0.6)
EOSINOPHIL NFR BLD: 0 %
GFR SERPLBLD CREATININE-BSD FMLA CKD-EPI: 39 ML/MIN/{1.73_M2}
GLUCOSE SERPL-MCNC: 151 MG/DL (ref 70–99)
HCT VFR BLD AUTO: 41.9 % (ref 36–48)
HGB BLD-MCNC: 14.1 G/DL (ref 12–16)
LYMPHOCYTES # BLD: 0.6 K/UL (ref 1–5.1)
LYMPHOCYTES NFR BLD: 7.6 %
MCH RBC QN AUTO: 32.5 PG (ref 26–34)
MCHC RBC AUTO-ENTMCNC: 33.7 G/DL (ref 31–36)
MCV RBC AUTO: 96.3 FL (ref 80–100)
MONOCYTES # BLD: 0.4 K/UL (ref 0–1.3)
MONOCYTES NFR BLD: 5.2 %
NEUTROPHILS # BLD: 6.7 K/UL (ref 1.7–7.7)
NEUTROPHILS NFR BLD: 87.1 %
PLATELET # BLD AUTO: 169 K/UL (ref 135–450)
PMV BLD AUTO: 8.3 FL (ref 5–10.5)
POTASSIUM SERPL-SCNC: 4.1 MMOL/L (ref 3.5–5.1)
PROT SERPL-MCNC: 6.2 G/DL (ref 6.4–8.2)
RBC # BLD AUTO: 4.35 M/UL (ref 4–5.2)
SODIUM SERPL-SCNC: 137 MMOL/L (ref 136–145)
SODIUM UR-SCNC: <20 MMOL/L
WBC # BLD AUTO: 7.7 K/UL (ref 4–11)

## 2024-12-25 PROCEDURE — 82550 ASSAY OF CK (CPK): CPT

## 2024-12-25 PROCEDURE — 94640 AIRWAY INHALATION TREATMENT: CPT

## 2024-12-25 PROCEDURE — 6360000002 HC RX W HCPCS: Performed by: EMERGENCY MEDICINE

## 2024-12-25 PROCEDURE — 94761 N-INVAS EAR/PLS OXIMETRY MLT: CPT

## 2024-12-25 PROCEDURE — 2700000000 HC OXYGEN THERAPY PER DAY

## 2024-12-25 PROCEDURE — 6360000002 HC RX W HCPCS: Performed by: PHYSICIAN ASSISTANT

## 2024-12-25 PROCEDURE — 6370000000 HC RX 637 (ALT 250 FOR IP): Performed by: STUDENT IN AN ORGANIZED HEALTH CARE EDUCATION/TRAINING PROGRAM

## 2024-12-25 PROCEDURE — 80053 COMPREHEN METABOLIC PANEL: CPT

## 2024-12-25 PROCEDURE — 1200000000 HC SEMI PRIVATE

## 2024-12-25 PROCEDURE — 2580000003 HC RX 258: Performed by: STUDENT IN AN ORGANIZED HEALTH CARE EDUCATION/TRAINING PROGRAM

## 2024-12-25 PROCEDURE — 6370000000 HC RX 637 (ALT 250 FOR IP): Performed by: PHYSICIAN ASSISTANT

## 2024-12-25 PROCEDURE — 93010 ELECTROCARDIOGRAM REPORT: CPT | Performed by: INTERNAL MEDICINE

## 2024-12-25 PROCEDURE — 85025 COMPLETE CBC W/AUTO DIFF WBC: CPT

## 2024-12-25 PROCEDURE — 36415 COLL VENOUS BLD VENIPUNCTURE: CPT

## 2024-12-25 PROCEDURE — 2580000003 HC RX 258: Performed by: PHYSICIAN ASSISTANT

## 2024-12-25 PROCEDURE — 2500000003 HC RX 250 WO HCPCS: Performed by: PHYSICIAN ASSISTANT

## 2024-12-25 RX ORDER — SODIUM CHLORIDE 9 MG/ML
INJECTION, SOLUTION INTRAVENOUS CONTINUOUS
Status: DISCONTINUED | OUTPATIENT
Start: 2024-12-25 | End: 2024-12-26

## 2024-12-25 RX ORDER — SODIUM CHLORIDE, SODIUM LACTATE, POTASSIUM CHLORIDE, CALCIUM CHLORIDE 600; 310; 30; 20 MG/100ML; MG/100ML; MG/100ML; MG/100ML
INJECTION, SOLUTION INTRAVENOUS CONTINUOUS
Status: DISPENSED | OUTPATIENT
Start: 2024-12-25 | End: 2024-12-25

## 2024-12-25 RX ORDER — PREDNISONE 20 MG/1
40 TABLET ORAL DAILY
Status: DISCONTINUED | OUTPATIENT
Start: 2024-12-25 | End: 2024-12-25

## 2024-12-25 RX ORDER — OSELTAMIVIR PHOSPHATE 30 MG/1
30 CAPSULE ORAL DAILY
Status: DISCONTINUED | OUTPATIENT
Start: 2024-12-25 | End: 2024-12-26

## 2024-12-25 RX ORDER — PANTOPRAZOLE SODIUM 40 MG/1
40 TABLET, DELAYED RELEASE ORAL
Status: DISCONTINUED | OUTPATIENT
Start: 2024-12-25 | End: 2024-12-31 | Stop reason: HOSPADM

## 2024-12-25 RX ORDER — IPRATROPIUM BROMIDE AND ALBUTEROL SULFATE 2.5; .5 MG/3ML; MG/3ML
1 SOLUTION RESPIRATORY (INHALATION)
Status: DISCONTINUED | OUTPATIENT
Start: 2024-12-25 | End: 2024-12-31

## 2024-12-25 RX ADMIN — APIXABAN 5 MG: 5 TABLET, FILM COATED ORAL at 20:42

## 2024-12-25 RX ADMIN — APIXABAN 5 MG: 5 TABLET, FILM COATED ORAL at 00:08

## 2024-12-25 RX ADMIN — AMIODARONE HYDROCHLORIDE 200 MG: 200 TABLET ORAL at 10:28

## 2024-12-25 RX ADMIN — IPRATROPIUM BROMIDE AND ALBUTEROL SULFATE 1 DOSE: .5; 3 SOLUTION RESPIRATORY (INHALATION) at 11:18

## 2024-12-25 RX ADMIN — IPRATROPIUM BROMIDE AND ALBUTEROL SULFATE 1 DOSE: .5; 3 SOLUTION RESPIRATORY (INHALATION) at 16:09

## 2024-12-25 RX ADMIN — METOPROLOL TARTRATE 50 MG: 50 TABLET, FILM COATED ORAL at 10:28

## 2024-12-25 RX ADMIN — APIXABAN 5 MG: 5 TABLET, FILM COATED ORAL at 10:28

## 2024-12-25 RX ADMIN — WATER 40 MG: 1 INJECTION INTRAMUSCULAR; INTRAVENOUS; SUBCUTANEOUS at 14:05

## 2024-12-25 RX ADMIN — Medication 2 PUFF: at 22:59

## 2024-12-25 RX ADMIN — POTASSIUM CHLORIDE 10 MEQ: 7.45 INJECTION INTRAVENOUS at 00:23

## 2024-12-25 RX ADMIN — OSELTAMIVIR PHOSPHATE 30 MG: 30 CAPSULE ORAL at 12:34

## 2024-12-25 RX ADMIN — IPRATROPIUM BROMIDE AND ALBUTEROL SULFATE 1 DOSE: .5; 3 SOLUTION RESPIRATORY (INHALATION) at 22:59

## 2024-12-25 RX ADMIN — WATER 40 MG: 1 INJECTION INTRAMUSCULAR; INTRAVENOUS; SUBCUTANEOUS at 20:42

## 2024-12-25 RX ADMIN — PREDNISONE 40 MG: 20 TABLET ORAL at 10:28

## 2024-12-25 RX ADMIN — SODIUM CHLORIDE: 9 INJECTION, SOLUTION INTRAVENOUS at 23:18

## 2024-12-25 RX ADMIN — Medication 2 PUFF: at 08:52

## 2024-12-25 RX ADMIN — SODIUM CHLORIDE, POTASSIUM CHLORIDE, SODIUM LACTATE AND CALCIUM CHLORIDE: 600; 310; 30; 20 INJECTION, SOLUTION INTRAVENOUS at 01:31

## 2024-12-25 RX ADMIN — SODIUM CHLORIDE: 9 INJECTION, SOLUTION INTRAVENOUS at 17:13

## 2024-12-25 RX ADMIN — PANTOPRAZOLE SODIUM 40 MG: 40 TABLET, DELAYED RELEASE ORAL at 05:05

## 2024-12-25 NOTE — ED NOTES
Patient Name: Vane Martini  : 1945 79 y.o.  MRN: [id number]  ED Room #: ED-0021/21     Chief complaint:   Chief Complaint   Patient presents with    Fall     [place] ems from home due to unwitnessed fall at home, per EMS, pt was on the ground fo 2 hours. Per pt, she tripped and fell. Denies LOC. Also, per EMS, pt was SOB upon arrival with O2 in the 60s, pt was given duoneb en route. Pt alert and oriented during triage      Hospital Problem/Diagnosis:   Hospital Problems             Last Modified POA    * (Principal) Generalized weakness 2024 Yes         O2 Flow Rate:O2 Device: Nasal cannula O2 Flow Rate (L/min): 5 L/min (if applicable)  Cardiac Rhythm:   (if applicable)  Active LDA's:   Peripheral IV 24 Posterior;Right Wrist (Active)            How does patient ambulate? Stand by assist    2. How does patient take pills? Whole with Water    3. Is patient alert? Alert    4. Is patient oriented? To Person, To Place, To Time, To Situation, and Follows Commands    5.   Patient arrived from:  home  Facility Name: ___________________________________________    6. If patient is disoriented or from a Skill Nursing Facility has family been notified of admission?  N/a    7. Patient belongings? Belongings: Clothing    Disposition of belongings? Kept with Patient     8. Any specific patient or family belongings/needs/dynamics?   a. N/a    9. Miscellaneous comments/pending orders?  a. N/a      If there are any additional questions please reach out to the Emergency Department.

## 2024-12-25 NOTE — RT PROTOCOL NOTE
RT Inhaler-Nebulizer Bronchodilator Protocol Note    There is a bronchodilator order in the chart from a provider indicating to follow the RT Bronchodilator Protocol and there is an “Initiate RT Inhaler-Nebulizer Bronchodilator Protocol” order as well (see protocol at bottom of note).    CXR Findings:  XR CHEST PORTABLE    Result Date: 12/24/2024  Findings suggest congestive heart failure       The findings from the last RT Protocol Assessment were as follows:   History Pulmonary Disease: Chronic pulmonary disease  Respiratory Pattern: Regular pattern and RR 12-20 bpm  Breath Sounds: Slightly diminished and/or crackles  Cough: Strong, spontaneous, non-productive  Indication for Bronchodilator Therapy:    Bronchodilator Assessment Score: 4    Aerosolized bronchodilator medication orders have been revised according to the RT Inhaler-Nebulizer Bronchodilator Protocol below.    Respiratory Therapist to perform RT Therapy Protocol Assessment initially then follow the protocol.  Repeat RT Therapy Protocol Assessment PRN for score 0-3 or on second treatment, BID, and PRN for scores above 3.    No Indications - adjust the frequency to every 6 hours PRN wheezing or bronchospasm, if no treatments needed after 48 hours then discontinue using Per Protocol order mode.     If indication present, adjust the RT bronchodilator orders based on the Bronchodilator Assessment Score as indicated below.  Use Inhaler orders unless patient has one or more of the following: on home nebulizer, not able to hold breath for 10 seconds, is not alert and oriented, cannot activate and use MDI correctly, or respiratory rate 25 breaths per minute or more, then use the equivalent nebulizer order(s) with same Frequency and PRN reasons based on the score.  If a patient is on this medication at home then do not decrease Frequency below that used at home.    0-3 - enter or revise RT bronchodilator order(s) to equivalent RT Bronchodilator order with

## 2024-12-25 NOTE — FLOWSHEET NOTE
12/24/24 224   Nursing Delirium Screening Scale (Nu-DESC)   Disorientation 0   Innappropriate Behavior 0   Innappropriate Communication 0   Illusions/Hallucinations 0   Psychomotor Retardation 0   Nu-DESC Score (calculated) 0

## 2024-12-25 NOTE — PROGRESS NOTES
4 Eyes Skin Assessment     NAME:  Vane Martini  YOB: 1945  MEDICAL RECORD NUMBER:  [id number]    The patient is being assessed for  Admission    I agree that at least one RN has performed a thorough Head to Toe Skin Assessment on the patient. ALL assessment sites listed below have been assessed.      Areas assessed by both nurses:    Head, Face, Ears, Shoulders, Back, Chest, Arms, Elbows, Hands, Sacrum. Buttock, Coccyx, Ischium, Legs. Feet and Heels, and Under Medical Devices         Does the Patient have a Wound? No noted wound(s)       Raul Prevention initiated by RN: No  Wound Care Orders initiated by RN: No    Pressure Injury (Stage 3,4, Unstageable, DTI, NWPT, and Complex wounds) if present, place Wound referral order by RN under : No    New Ostomies, if present place, Ostomy referral order under : No     Nurse 1 eSignature: Electronically signed by Missy Love RN on 12/24/24 at 11:00 PM EST    **SHARE this note so that the co-signing nurse can place an eSignature**    Nurse 2 eSignature: Electronically signed by Nevin Pino RN on 12/25/24 at 6:42 AM EST

## 2024-12-25 NOTE — H&P
History and Physical      Name:  Vane Martini /Age/Sex: 1945  (79 y.o. female)   MRN & CSN:  [id number] & [id number] Encounter Date/Time:2024  8:43 PM EST   Location:  [id number] PCP: No primary care provider on file.       Assessment and Plan:   Vane Martini is a 79 y.o. female with atrial flutter/persistent atrial fibrillation, chronic combined systolic and diastolic heart failure, severe COPD presented from home after sustaining a fall    Mechanical fall  Generalized weakness  In the setting of underlying viral infection  No focal symptoms or lateralization  Fall precautions, PT/OT    Influenza A infection  Acute respiratory failure with hypoxia  Severe COPD with mild exacerbation  CT chest personally reviewed severe emphysematous disease, no focal infiltrate or consolidation.   Prednisone 40 mg, Symbicort and Spiriva, as needed Duoneb  Supplemental oxygen to maintain saturation 88 to 92%  6-minute walk test on discharge    Hypokalemia, 2.8  Acute kidney injury  Likely prerenal due to volume depletion, rhabdomyolysis and diuretics  No obstruction or hydronephrosis on imaging  IV and oral K+ ordered in ER  Hold nephrotoxic agents, 1 L IV LR infusion monitor renal function    Lactic acidosis  Resolved after fluid resuscitation    Nontraumatic rhabdomyolysis  CK 1261, IV LR infusion as mentioned above monitor CK level    Elevated troponin, 38> 40  Secondary to rhabdomyolysis, nonischemic EKG    Abnormal LFTs in hepatocellular pattern  Secondary to rhabdomyolysis, management as above monitor CMP    Atrial flutter/atrial fibrillation  Continue Lopressor, amiodarone and Eliquis    Chronic combined systolic and diastolic heart failure  Clinically hypovolemic  Hold torsemide spironolactone and Jardiance    Left orbit hemangioma  Outpatient follow-up with PCP    Abdominal aortic aneurysm  Infrarenal abdominal aortic aneurysm measuring 4.7 cm  No prior imaging to compare, outpatient vascular      Imaging/Diagnostics Last 24 Hours     CT CHEST WO CONTRAST  Result Date: 12/24/2024  EXAMINATION: CT OF THE CHEST WITHOUT CONTRAST 12/24/2024 5:42 pm TECHNIQUE: CT of the chest was performed without the administration of intravenous contrast. Multiplanar reformatted images are provided for review. Automated exposure control, iterative reconstruction, and/or weight based adjustment of the mA/kV was utilized to reduce the radiation dose to as low as reasonably achievable. COMPARISON: 07/02/2024 HISTORY: ORDERING SYSTEM PROVIDED HISTORY: hypoxia, fall TECHNOLOGIST PROVIDED HISTORY: Reason for exam:->hypoxia, fall Decision Support Exception - unselect if not a suspected or confirmed emergency medical condition->Emergency Medical Condition (MA) FINDINGS: Mediastinum: Mild atherosclerotic changes of the aorta and great vessels. Negative for aneurysm.  Multiple small lymph nodes in mediastinum.  Pulmonary artery is mildly dilated.  Heart size is globular and enlarged.  Tiny amount of fluid in the pericardium. Lungs/pleura: Negative for pneumothorax.  Extensive emphysema which was noted on the previous study.  Honeycombing is present bilaterally.  Respiratory motion.  Negative for subcutaneous emphysema.  No definite lung contusion. Mild bibasilar atelectasis.  Small pleural effusions. Upper Abdomen: Questionable abdominal aortic aneurysm.  This is incompletely evaluated. Soft Tissues/Bones: Scoliosis.  No no definite fracture.  Chronic compressions of the thoracic spine.  Anterior osteophytes at multiple levels. Sternum the is not well evaluated secondary to the motion artifact.  No definite fracture.  There is an old fracture of the sternum.   Negative for acute traumatic injury. 2. Extensive emphysema. 3. Questionable abdominal aortic aneurysm.     CT HEAD WO CONTRAST  Result Date: 12/24/2024  EXAMINATION: CT OF THE HEAD WITHOUT CONTRAST  12/24/2024 5:41 pm TECHNIQUE: CT of the head was performed without the

## 2024-12-25 NOTE — PROGRESS NOTES
12/24/24 2223   RT Protocol   History Pulmonary Disease 2   Respiratory pattern 0   Breath sounds 2   Cough 0   Bronchodilator Assessment Score 4

## 2024-12-25 NOTE — PROGRESS NOTES
[facility]    12/25/2024 1:21 PM        Name: Vane Martini .              Admitted: 12/24/2024  Primary Care Provider: No primary care provider on file. (Tel: None)    Brief Course: Admitted with fall, found to have rhabdo, influenza,        Subjective:    Patient started feeling poorly with increased SOB on Sunday. She fell and was too weak to get up. Her sister found her. She has no children and lives alone. Remains SOB, occasional cough. No better or worse since admission. She is on 5L.     Reviewed interval ancillary notes    Current Medications  pantoprazole (PROTONIX) tablet 40 mg, QAM AC  methylPREDNISolone sodium succ (SOLU-MEDROL) 40 mg in sterile water 1 mL injection, Q6H  ipratropium 0.5 mg-albuterol 2.5 mg (DUONEB) nebulizer solution 1 Dose, Q4H WA RT  oseltamivir (TAMIFLU) capsule 30 mg, Daily  ipratropium 0.5 mg-albuterol 2.5 mg (DUONEB) nebulizer solution 1 Dose, Q4H PRN  amiodarone (CORDARONE) tablet 200 mg, Daily  apixaban (ELIQUIS) tablet 5 mg, BID  [Held by provider] empagliflozin (JARDIANCE) tablet 10 mg, Daily  metoprolol tartrate (LOPRESSOR) tablet 50 mg, BID  [Held by provider] spironolactone (ALDACTONE) tablet 25 mg, Daily  [Held by provider] torsemide (DEMADEX) tablet 20 mg, BID  budesonide-formoterol (SYMBICORT) 160-4.5 MCG/ACT inhaler 2 puff, BID RT        Objective:  BP 93/61   Pulse 54   Temp 97.9 °F (36.6 °C) (Oral)   Resp 18   Ht 1.6 m (5' 3\")   Wt 56.2 kg (123 lb 14.4 oz)   SpO2 93%   BMI 21.95 kg/m²     Intake/Output Summary (Last 24 hours) at 12/25/2024 1321  Last data filed at 12/25/2024 0420  Gross per 24 hour   Intake 281.18 ml   Output --   Net 281.18 ml      Wt Readings from Last 3 Encounters:   12/25/24 56.2 kg (123 lb 14.4 oz)   09/16/24 54.8 kg (120 lb 12.8 oz)   09/03/24 54.4 kg (120 lb)       General appearance:  Appears comfortable. AAOx3  HEENT: atraumatic, Pupils

## 2024-12-26 ENCOUNTER — APPOINTMENT (OUTPATIENT)
Age: 79
DRG: 193 | End: 2024-12-26
Attending: INTERNAL MEDICINE
Payer: MEDICARE

## 2024-12-26 ENCOUNTER — APPOINTMENT (OUTPATIENT)
Dept: GENERAL RADIOLOGY | Age: 79
DRG: 193 | End: 2024-12-26
Payer: MEDICARE
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
## 2024-12-26 LAB
ANION GAP SERPL CALCULATED.3IONS-SCNC: 9 MMOL/L (ref 3–16)
BASE EXCESS BLDA CALC-SCNC: -2.1 MMOL/L (ref -3–3)
BASOPHILS # BLD: 0 K/UL (ref 0–0.2)
BASOPHILS NFR BLD: 0.1 %
BUN SERPL-MCNC: 34 MG/DL (ref 7–20)
CALCIUM SERPL-MCNC: 8.2 MG/DL (ref 8.3–10.6)
CHLORIDE SERPL-SCNC: 113 MMOL/L (ref 99–110)
CK SERPL-CCNC: 544 U/L (ref 26–192)
CO2 BLDA-SCNC: 50.6 MMOL/L
CO2 SERPL-SCNC: 22 MMOL/L (ref 21–32)
COHGB MFR BLDA: 0.8 % (ref 0–1.5)
CREAT SERPL-MCNC: 1.1 MG/DL (ref 0.6–1.2)
DEPRECATED RDW RBC AUTO: 13.9 % (ref 12.4–15.4)
ECHO AO ASC DIAM: 2.9 CM
ECHO AO ASCENDING AORTA INDEX: 1.84 CM/M2
ECHO AO ROOT DIAM: 3.1 CM
ECHO AO ROOT INDEX: 1.96 CM/M2
ECHO AV AREA PEAK VELOCITY: 1.8 CM2
ECHO AV AREA VTI: 2.1 CM2
ECHO AV AREA/BSA PEAK VELOCITY: 1.1 CM2/M2
ECHO AV AREA/BSA VTI: 1.3 CM2/M2
ECHO AV MEAN GRADIENT: 6 MMHG
ECHO AV MEAN VELOCITY: 1.1 M/S
ECHO AV PEAK GRADIENT: 11 MMHG
ECHO AV PEAK VELOCITY: 1.7 M/S
ECHO AV VELOCITY RATIO: 0.65
ECHO AV VTI: 22.7 CM
ECHO BSA: 1.58 M2
ECHO EST RA PRESSURE: 3 MMHG
ECHO LA AREA 2C: 32.2 CM2
ECHO LA AREA 4C: 24.9 CM2
ECHO LA MAJOR AXIS: 7.4 CM
ECHO LA MINOR AXIS: 7.9 CM
ECHO LA VOL BP: 90 ML (ref 22–52)
ECHO LA VOL MOD A2C: 110 ML (ref 22–52)
ECHO LA VOL MOD A4C: 69 ML (ref 22–52)
ECHO LA VOL/BSA BIPLANE: 57 ML/M2 (ref 16–34)
ECHO LA VOLUME INDEX MOD A2C: 70 ML/M2 (ref 16–34)
ECHO LA VOLUME INDEX MOD A4C: 44 ML/M2 (ref 16–34)
ECHO LV EDV A4C: 30 ML
ECHO LV EDV INDEX A4C: 19 ML/M2
ECHO LV EJECTION FRACTION A4C: 68 %
ECHO LV EJECTION FRACTION BIPLANE: 55 % (ref 55–100)
ECHO LV ESV A4C: 10 ML
ECHO LV ESV INDEX A4C: 6 ML/M2
ECHO LV FRACTIONAL SHORTENING: 37 % (ref 28–44)
ECHO LV INTERNAL DIMENSION DIASTOLE INDEX: 2.72 CM/M2
ECHO LV INTERNAL DIMENSION DIASTOLIC: 4.3 CM (ref 3.9–5.3)
ECHO LV INTERNAL DIMENSION SYSTOLIC INDEX: 1.71 CM/M2
ECHO LV INTERNAL DIMENSION SYSTOLIC: 2.7 CM
ECHO LV IVSD: 1.3 CM (ref 0.6–0.9)
ECHO LV MASS 2D: 207.8 G (ref 67–162)
ECHO LV MASS INDEX 2D: 131.5 G/M2 (ref 43–95)
ECHO LV POSTERIOR WALL DIASTOLIC: 1.3 CM (ref 0.6–0.9)
ECHO LV RELATIVE WALL THICKNESS RATIO: 0.6
ECHO LVOT AREA: 2.8 CM2
ECHO LVOT AV VTI INDEX: 0.75
ECHO LVOT DIAM: 1.9 CM
ECHO LVOT MEAN GRADIENT: 3 MMHG
ECHO LVOT PEAK GRADIENT: 5 MMHG
ECHO LVOT PEAK VELOCITY: 1.1 M/S
ECHO LVOT STROKE VOLUME INDEX: 30.5 ML/M2
ECHO LVOT SV: 48.2 ML
ECHO LVOT VTI: 17 CM
ECHO PV MAX VELOCITY: 1 M/S
ECHO PV PEAK GRADIENT: 4 MMHG
ECHO RA AREA 4C: 22.5 CM2
ECHO RA END SYSTOLIC VOLUME APICAL 4 CHAMBER INDEX BSA: 35 ML/M2
ECHO RA VOLUME: 56 ML
ECHO RIGHT VENTRICULAR SYSTOLIC PRESSURE (RVSP): 37 MMHG
ECHO RV BASAL DIMENSION: 2.8 CM
ECHO RV FREE WALL PEAK S': 12.5 CM/S
ECHO RV LONGITUDINAL DIMENSION: 5.2 CM
ECHO RV TAPSE: 1.3 CM (ref 1.7–?)
ECHO TV REGURGITANT MAX VELOCITY: 2.9 M/S
ECHO TV REGURGITANT PEAK GRADIENT: 34 MMHG
EKG ATRIAL RATE: 182 BPM
EKG DIAGNOSIS: NORMAL
EKG Q-T INTERVAL: 270 MS
EKG QRS DURATION: 82 MS
EKG QTC CALCULATION (BAZETT): 386 MS
EKG R AXIS: 11 DEGREES
EKG T AXIS: 135 DEGREES
EKG VENTRICULAR RATE: 123 BPM
EOSINOPHIL # BLD: 0 K/UL (ref 0–0.6)
EOSINOPHIL NFR BLD: 0 %
GFR SERPLBLD CREATININE-BSD FMLA CKD-EPI: 51 ML/MIN/{1.73_M2}
GLUCOSE SERPL-MCNC: 163 MG/DL (ref 70–99)
HCO3 BLDA-SCNC: 21.6 MMOL/L (ref 21–29)
HCT VFR BLD AUTO: 40.6 % (ref 36–48)
HGB BLD-MCNC: 13.7 G/DL (ref 12–16)
HGB BLDA-MCNC: 14.2 G/DL (ref 12–16)
LYMPHOCYTES # BLD: 0.7 K/UL (ref 1–5.1)
LYMPHOCYTES NFR BLD: 6.2 %
MAGNESIUM SERPL-MCNC: 2.08 MG/DL (ref 1.8–2.4)
MCH RBC QN AUTO: 32.4 PG (ref 26–34)
MCHC RBC AUTO-ENTMCNC: 33.8 G/DL (ref 31–36)
MCV RBC AUTO: 95.8 FL (ref 80–100)
METHGB MFR BLDA: 0.1 %
MONOCYTES # BLD: 0.6 K/UL (ref 0–1.3)
MONOCYTES NFR BLD: 6.1 %
NEUTROPHILS # BLD: 9.3 K/UL (ref 1.7–7.7)
NEUTROPHILS NFR BLD: 87.6 %
O2 THERAPY: ABNORMAL
PCO2 BLDA: 33.1 MMHG (ref 35–45)
PH BLDA: 7.42 [PH] (ref 7.35–7.45)
PLATELET # BLD AUTO: 165 K/UL (ref 135–450)
PMV BLD AUTO: 8.2 FL (ref 5–10.5)
PO2 BLDA: 75 MMHG (ref 75–108)
POTASSIUM SERPL-SCNC: 3.8 MMOL/L (ref 3.5–5.1)
PROCALCITONIN SERPL IA-MCNC: 0.15 NG/ML (ref 0–0.15)
RBC # BLD AUTO: 4.24 M/UL (ref 4–5.2)
SAO2 % BLDA: 95.9 %
SODIUM SERPL-SCNC: 144 MMOL/L (ref 136–145)
WBC # BLD AUTO: 10.6 K/UL (ref 4–11)

## 2024-12-26 PROCEDURE — 82803 BLOOD GASES ANY COMBINATION: CPT

## 2024-12-26 PROCEDURE — 6360000002 HC RX W HCPCS: Performed by: PHYSICIAN ASSISTANT

## 2024-12-26 PROCEDURE — 93306 TTE W/DOPPLER COMPLETE: CPT | Performed by: INTERNAL MEDICINE

## 2024-12-26 PROCEDURE — 6360000002 HC RX W HCPCS: Performed by: STUDENT IN AN ORGANIZED HEALTH CARE EDUCATION/TRAINING PROGRAM

## 2024-12-26 PROCEDURE — 6370000000 HC RX 637 (ALT 250 FOR IP): Performed by: PHYSICIAN ASSISTANT

## 2024-12-26 PROCEDURE — 6360000002 HC RX W HCPCS: Performed by: INTERNAL MEDICINE

## 2024-12-26 PROCEDURE — 93005 ELECTROCARDIOGRAM TRACING: CPT | Performed by: PHYSICIAN ASSISTANT

## 2024-12-26 PROCEDURE — 05HY33Z INSERTION OF INFUSION DEVICE INTO UPPER VEIN, PERCUTANEOUS APPROACH: ICD-10-PCS | Performed by: STUDENT IN AN ORGANIZED HEALTH CARE EDUCATION/TRAINING PROGRAM

## 2024-12-26 PROCEDURE — C1751 CATH, INF, PER/CENT/MIDLINE: HCPCS

## 2024-12-26 PROCEDURE — 93306 TTE W/DOPPLER COMPLETE: CPT

## 2024-12-26 PROCEDURE — 84145 PROCALCITONIN (PCT): CPT

## 2024-12-26 PROCEDURE — 93010 ELECTROCARDIOGRAM REPORT: CPT | Performed by: INTERNAL MEDICINE

## 2024-12-26 PROCEDURE — 2500000003 HC RX 250 WO HCPCS: Performed by: PHYSICIAN ASSISTANT

## 2024-12-26 PROCEDURE — 5A0945A ASSISTANCE WITH RESPIRATORY VENTILATION, 24-96 CONSECUTIVE HOURS, HIGH NASAL FLOW/VELOCITY: ICD-10-PCS | Performed by: STUDENT IN AN ORGANIZED HEALTH CARE EDUCATION/TRAINING PROGRAM

## 2024-12-26 PROCEDURE — 83735 ASSAY OF MAGNESIUM: CPT

## 2024-12-26 PROCEDURE — 2700000000 HC OXYGEN THERAPY PER DAY

## 2024-12-26 PROCEDURE — 99291 CRITICAL CARE FIRST HOUR: CPT | Performed by: STUDENT IN AN ORGANIZED HEALTH CARE EDUCATION/TRAINING PROGRAM

## 2024-12-26 PROCEDURE — 6370000000 HC RX 637 (ALT 250 FOR IP): Performed by: STUDENT IN AN ORGANIZED HEALTH CARE EDUCATION/TRAINING PROGRAM

## 2024-12-26 PROCEDURE — 36569 INSJ PICC 5 YR+ W/O IMAGING: CPT

## 2024-12-26 PROCEDURE — 2580000003 HC RX 258: Performed by: INTERNAL MEDICINE

## 2024-12-26 PROCEDURE — 2000000000 HC ICU R&B

## 2024-12-26 PROCEDURE — 2580000003 HC RX 258: Performed by: PHYSICIAN ASSISTANT

## 2024-12-26 PROCEDURE — 80048 BASIC METABOLIC PNL TOTAL CA: CPT

## 2024-12-26 PROCEDURE — 36600 WITHDRAWAL OF ARTERIAL BLOOD: CPT

## 2024-12-26 PROCEDURE — 0BH17EZ INSERTION OF ENDOTRACHEAL AIRWAY INTO TRACHEA, VIA NATURAL OR ARTIFICIAL OPENING: ICD-10-PCS | Performed by: STUDENT IN AN ORGANIZED HEALTH CARE EDUCATION/TRAINING PROGRAM

## 2024-12-26 PROCEDURE — 2500000003 HC RX 250 WO HCPCS: Performed by: STUDENT IN AN ORGANIZED HEALTH CARE EDUCATION/TRAINING PROGRAM

## 2024-12-26 PROCEDURE — 71045 X-RAY EXAM CHEST 1 VIEW: CPT

## 2024-12-26 PROCEDURE — 99223 1ST HOSP IP/OBS HIGH 75: CPT | Performed by: INTERNAL MEDICINE

## 2024-12-26 PROCEDURE — 94761 N-INVAS EAR/PLS OXIMETRY MLT: CPT

## 2024-12-26 PROCEDURE — 85025 COMPLETE CBC W/AUTO DIFF WBC: CPT

## 2024-12-26 PROCEDURE — 36415 COLL VENOUS BLD VENIPUNCTURE: CPT

## 2024-12-26 PROCEDURE — 5A09457 ASSISTANCE WITH RESPIRATORY VENTILATION, 24-96 CONSECUTIVE HOURS, CONTINUOUS POSITIVE AIRWAY PRESSURE: ICD-10-PCS | Performed by: STUDENT IN AN ORGANIZED HEALTH CARE EDUCATION/TRAINING PROGRAM

## 2024-12-26 PROCEDURE — 94660 CPAP INITIATION&MGMT: CPT

## 2024-12-26 PROCEDURE — 82550 ASSAY OF CK (CPK): CPT

## 2024-12-26 PROCEDURE — 94640 AIRWAY INHALATION TREATMENT: CPT

## 2024-12-26 RX ORDER — AZITHROMYCIN 250 MG/1
500 TABLET, FILM COATED ORAL DAILY
Status: DISCONTINUED | OUTPATIENT
Start: 2024-12-26 | End: 2024-12-26

## 2024-12-26 RX ORDER — SODIUM CHLORIDE 9 MG/ML
INJECTION, SOLUTION INTRAVENOUS CONTINUOUS
Status: DISCONTINUED | OUTPATIENT
Start: 2024-12-26 | End: 2024-12-26

## 2024-12-26 RX ORDER — AZITHROMYCIN 250 MG/1
250 TABLET, FILM COATED ORAL DAILY
Status: DISCONTINUED | OUTPATIENT
Start: 2024-12-27 | End: 2024-12-26

## 2024-12-26 RX ORDER — METOPROLOL TARTRATE 1 MG/ML
5 INJECTION, SOLUTION INTRAVENOUS ONCE
Status: COMPLETED | OUTPATIENT
Start: 2024-12-26 | End: 2024-12-26

## 2024-12-26 RX ORDER — DIGOXIN 0.25 MG/ML
250 INJECTION INTRAMUSCULAR; INTRAVENOUS ONCE
Status: COMPLETED | OUTPATIENT
Start: 2024-12-26 | End: 2024-12-26

## 2024-12-26 RX ORDER — FUROSEMIDE 10 MG/ML
INJECTION INTRAMUSCULAR; INTRAVENOUS
Status: DISPENSED
Start: 2024-12-26 | End: 2024-12-26

## 2024-12-26 RX ORDER — METRONIDAZOLE 500 MG/100ML
500 INJECTION, SOLUTION INTRAVENOUS EVERY 8 HOURS
Status: DISCONTINUED | OUTPATIENT
Start: 2024-12-26 | End: 2024-12-28

## 2024-12-26 RX ORDER — SODIUM CHLORIDE 0.9 % (FLUSH) 0.9 %
5-40 SYRINGE (ML) INJECTION PRN
Status: DISCONTINUED | OUTPATIENT
Start: 2024-12-26 | End: 2024-12-31 | Stop reason: HOSPADM

## 2024-12-26 RX ORDER — LIDOCAINE HYDROCHLORIDE 10 MG/ML
50 INJECTION, SOLUTION EPIDURAL; INFILTRATION; INTRACAUDAL; PERINEURAL ONCE
Status: COMPLETED | OUTPATIENT
Start: 2024-12-26 | End: 2024-12-26

## 2024-12-26 RX ORDER — FUROSEMIDE 10 MG/ML
20 INJECTION INTRAMUSCULAR; INTRAVENOUS ONCE
Status: COMPLETED | OUTPATIENT
Start: 2024-12-26 | End: 2024-12-26

## 2024-12-26 RX ORDER — BUDESONIDE 0.5 MG/2ML
0.5 INHALANT ORAL
Status: DISCONTINUED | OUTPATIENT
Start: 2024-12-26 | End: 2024-12-31

## 2024-12-26 RX ORDER — SODIUM CHLORIDE 9 MG/ML
INJECTION, SOLUTION INTRAVENOUS PRN
Status: DISCONTINUED | OUTPATIENT
Start: 2024-12-26 | End: 2024-12-31 | Stop reason: HOSPADM

## 2024-12-26 RX ORDER — ARFORMOTEROL TARTRATE 15 UG/2ML
15 SOLUTION RESPIRATORY (INHALATION)
Status: DISCONTINUED | OUTPATIENT
Start: 2024-12-26 | End: 2024-12-31

## 2024-12-26 RX ORDER — 0.9 % SODIUM CHLORIDE 0.9 %
500 INTRAVENOUS SOLUTION INTRAVENOUS ONCE
Status: COMPLETED | OUTPATIENT
Start: 2024-12-26 | End: 2024-12-26

## 2024-12-26 RX ORDER — SODIUM CHLORIDE 0.9 % (FLUSH) 0.9 %
5-40 SYRINGE (ML) INJECTION EVERY 12 HOURS SCHEDULED
Status: DISCONTINUED | OUTPATIENT
Start: 2024-12-26 | End: 2024-12-31 | Stop reason: HOSPADM

## 2024-12-26 RX ORDER — OSELTAMIVIR PHOSPHATE 30 MG/1
30 CAPSULE ORAL 2 TIMES DAILY
Status: COMPLETED | OUTPATIENT
Start: 2024-12-26 | End: 2024-12-30

## 2024-12-26 RX ADMIN — DIGOXIN 250 MCG: 250 INJECTION, SOLUTION INTRAMUSCULAR; INTRAVENOUS; PARENTERAL at 12:46

## 2024-12-26 RX ADMIN — PANTOPRAZOLE SODIUM 40 MG: 40 TABLET, DELAYED RELEASE ORAL at 05:47

## 2024-12-26 RX ADMIN — FUROSEMIDE 20 MG: 10 INJECTION, SOLUTION INTRAMUSCULAR; INTRAVENOUS at 09:41

## 2024-12-26 RX ADMIN — OSELTAMIVIR PHOSPHATE 30 MG: 30 CAPSULE ORAL at 20:30

## 2024-12-26 RX ADMIN — LIDOCAINE HYDROCHLORIDE ANHYDROUS 50 MG: 10 INJECTION, SOLUTION INFILTRATION at 15:07

## 2024-12-26 RX ADMIN — SODIUM CHLORIDE: 9 INJECTION, SOLUTION INTRAVENOUS at 12:49

## 2024-12-26 RX ADMIN — Medication 2 PUFF: at 07:46

## 2024-12-26 RX ADMIN — APIXABAN 5 MG: 5 TABLET, FILM COATED ORAL at 09:04

## 2024-12-26 RX ADMIN — AMIODARONE HYDROCHLORIDE 0.5 MG/MIN: 1.8 INJECTION, SOLUTION INTRAVENOUS at 21:38

## 2024-12-26 RX ADMIN — IPRATROPIUM BROMIDE AND ALBUTEROL SULFATE 1 DOSE: .5; 3 SOLUTION RESPIRATORY (INHALATION) at 15:29

## 2024-12-26 RX ADMIN — APIXABAN 5 MG: 5 TABLET, FILM COATED ORAL at 20:30

## 2024-12-26 RX ADMIN — WATER 40 MG: 1 INJECTION INTRAMUSCULAR; INTRAVENOUS; SUBCUTANEOUS at 02:21

## 2024-12-26 RX ADMIN — DIGOXIN 250 MCG: 0.25 INJECTION INTRAMUSCULAR; INTRAVENOUS at 16:46

## 2024-12-26 RX ADMIN — SODIUM CHLORIDE 500 ML: 9 INJECTION, SOLUTION INTRAVENOUS at 10:48

## 2024-12-26 RX ADMIN — METRONIDAZOLE 500 MG: 500 INJECTION, SOLUTION INTRAVENOUS at 16:30

## 2024-12-26 RX ADMIN — WATER 40 MG: 1 INJECTION INTRAMUSCULAR; INTRAVENOUS; SUBCUTANEOUS at 09:05

## 2024-12-26 RX ADMIN — IPRATROPIUM BROMIDE AND ALBUTEROL SULFATE 1 DOSE: .5; 3 SOLUTION RESPIRATORY (INHALATION) at 07:46

## 2024-12-26 RX ADMIN — OSELTAMIVIR PHOSPHATE 30 MG: 30 CAPSULE ORAL at 09:04

## 2024-12-26 RX ADMIN — IPRATROPIUM BROMIDE AND ALBUTEROL SULFATE 1 DOSE: .5; 3 SOLUTION RESPIRATORY (INHALATION) at 11:17

## 2024-12-26 RX ADMIN — AMIODARONE HYDROCHLORIDE 200 MG: 200 TABLET ORAL at 09:04

## 2024-12-26 RX ADMIN — AZITHROMYCIN MONOHYDRATE 500 MG: 500 INJECTION, POWDER, LYOPHILIZED, FOR SOLUTION INTRAVENOUS at 12:00

## 2024-12-26 RX ADMIN — IPRATROPIUM BROMIDE AND ALBUTEROL SULFATE 1 DOSE: .5; 3 SOLUTION RESPIRATORY (INHALATION) at 19:22

## 2024-12-26 RX ADMIN — SODIUM CHLORIDE, PRESERVATIVE FREE 10 ML: 5 INJECTION INTRAVENOUS at 20:31

## 2024-12-26 RX ADMIN — WATER 1000 MG: 1 INJECTION INTRAMUSCULAR; INTRAVENOUS; SUBCUTANEOUS at 10:00

## 2024-12-26 RX ADMIN — AMIODARONE HYDROCHLORIDE 1 MG/MIN: 1.8 INJECTION, SOLUTION INTRAVENOUS at 15:44

## 2024-12-26 RX ADMIN — ARFORMOTEROL TARTRATE 15 MCG: 15 SOLUTION RESPIRATORY (INHALATION) at 19:35

## 2024-12-26 RX ADMIN — METOPROLOL TARTRATE 5 MG: 5 INJECTION INTRAVENOUS at 09:05

## 2024-12-26 RX ADMIN — BUDESONIDE 500 MCG: 0.5 SUSPENSION RESPIRATORY (INHALATION) at 19:23

## 2024-12-26 NOTE — PROGRESS NOTES
Pt HR elevated 0845 this AM EKG shows A fib RVR. 1 time dose metoprolol given. Pt O2 declining dropping into 80's , slowly increased up to 10L O2 and now Bipap. Lungs with ronchi on R side with O2 decreasing with drinks, made NPO with speech consult and 1 time dose 20 IV lasix, BP dropped into 80's systolic. 1 time dose NS bolus ordered. Pt has orders to transfer to CVU awaiting clean bed.

## 2024-12-26 NOTE — PROGRESS NOTES
Pt transferred to CVU at this time per order. Attempt to call Precious Diaz to update on patients condition. However housekeeping has erased Precious Diaz's number from patients board. CVU updated

## 2024-12-26 NOTE — PLAN OF CARE
Problem: Chronic Conditions and Co-morbidities  Goal: Patient's chronic conditions and co-morbidity symptoms are monitored and maintained or improved  12/26/2024 0358 by Kee La RN  Outcome: Progressing  12/26/2024 0358 by Kee La RN  Outcome: Progressing  Flowsheets (Taken 12/25/2024 2000)  Care Plan - Patient's Chronic Conditions and Co-Morbidity Symptoms are Monitored and Maintained or Improved:   Monitor and assess patient's chronic conditions and comorbid symptoms for stability, deterioration, or improvement   Collaborate with multidisciplinary team to address chronic and comorbid conditions and prevent exacerbation or deterioration   Update acute care plan with appropriate goals if chronic or comorbid symptoms are exacerbated and prevent overall improvement and discharge     Problem: Safety - Adult  Goal: Free from fall injury  12/26/2024 0358 by Kee La RN  Outcome: Progressing  12/26/2024 0358 by Kee La RN  Outcome: Progressing     Problem: Respiratory - Adult  Goal: Achieves optimal ventilation and oxygenation  Outcome: Progressing     Problem: Cardiovascular - Adult  Goal: Maintains optimal cardiac output and hemodynamic stability  Outcome: Progressing  Goal: Absence of cardiac dysrhythmias or at baseline  Outcome: Progressing     Problem: Musculoskeletal - Adult  Goal: Return mobility to safest level of function  Outcome: Progressing

## 2024-12-26 NOTE — CONSULTS
[facility] Pulmonary and Critical Care   Consult Note      Reason for Consult: Acute hypoxic respiratory failure/atrial fibrillation with RVR  Requesting Physician: Lisbeth Gallagher    Subjective:   CHIEF COMPLAINT: Fall at home, respiratory distress, shortness of breath     HPI: Patient presents with a fall at home and was noted to have worsening shortness of breath and hypoxia.  She apparently was on the ground for at least a couple of hours.  Fall thought to be accidental.  Patient was noted to be severely hypoxemic in the ER, improved with O2-tested positive for influenza A.  No significant phlegm with cough.  She was also noted to have TJ with mild rhabdomyolysis and received IVF.  Overnight/this a.m., patient was noted to have atrial fibrillation with RVR-rate up to 150/min.  Now in respiratory distress, requiring HFNC 10 L.  Hence pulmonary consultation has been requested.    Known to me from office, severe COPD with PFT from August 2023 showing FEV1 of 1 L [50% predicted.  On Trelegy and albuterol inhaler at home.  Former smoker, quit over 1 year ago.  No home O2 needs.  History of chronic atrial fibrillation, on amiodarone.  CHF with diastolic dysfunction, moderate-severe mitral regurgitation/grade 2 diastolic dysfunction and severe pulmonary hypertension.       The patient is a 79 y.o. female with significant past medical history of:      Diagnosis Date    Abnormal weight loss     Chronic combined systolic and diastolic heart failure (HCC)     COPD with emphysema (HCC)     PAF (paroxysmal atrial fibrillation) (HCC)     Pericardial effusion         Past Surgical History:    History reviewed. No pertinent surgical history.  Current Medications:    Current Facility-Administered Medications: budesonide (PULMICORT) nebulizer suspension 500 mcg, 0.5 mg, Nebulization, BID RT  arformoterol tartrate (BROVANA) nebulizer solution 15 mcg, 15 mcg, Nebulization, BID RT  cefTRIAXone (ROCEPHIN) 1,000 mg in sterile water 10 mL IV  intravenous  contrast. Multiplanar reformatted images are provided for review. Automated  exposure control, iterative reconstruction, and/or weight based adjustment of  the mA/kV was utilized to reduce the radiation dose to as low as reasonably  achievable.     COMPARISON:  07/02/2024     HISTORY:  ORDERING SYSTEM PROVIDED HISTORY: hypoxia, fall  TECHNOLOGIST PROVIDED HISTORY:  Reason for exam:->hypoxia, fall  Decision Support Exception - unselect if not a suspected or confirmed  emergency medical condition->Emergency Medical Condition (MA)     FINDINGS:  Mediastinum: Mild atherosclerotic changes of the aorta and great vessels.  Negative for aneurysm.  Multiple small lymph nodes in mediastinum.  Pulmonary  artery is mildly dilated.  Heart size is globular and enlarged.  Tiny amount  of fluid in the pericardium.     Lungs/pleura: Negative for pneumothorax.  Extensive emphysema which was noted  on the previous study.  Honeycombing is present bilaterally.  Respiratory  motion.  Negative for subcutaneous emphysema.  No definite lung contusion.  Mild bibasilar atelectasis.  Small pleural effusions.     Upper Abdomen: Questionable abdominal aortic aneurysm.  This is incompletely  evaluated.     Soft Tissues/Bones: Scoliosis.  No no definite fracture.  Chronic  compressions of the thoracic spine.  Anterior osteophytes at multiple levels.  Sternum the is not well evaluated secondary to the motion artifact.  No  definite fracture.  There is an old fracture of the sternum.     IMPRESSION:  1. Negative for acute traumatic injury.  2. Extensive emphysema.  3. Questionable abdominal aortic aneurysm.    EXAMINATION:  ONE XRAY VIEW OF THE CHEST     12/26/2024 8:45 am     COMPARISON:  Chest x-ray 12/24/2024     HISTORY:  ORDERING SYSTEM PROVIDED HISTORY: hypoxia  TECHNOLOGIST PROVIDED HISTORY:  Reason for exam:->hypoxia     FINDINGS:  There are new right greater than left multifocal fluffy airspace opacities.  There is silhouetting

## 2024-12-26 NOTE — CONSULTS
spironolactone  25 mg Oral Daily    [Held by provider] torsemide  20 mg Oral BID     Continuous Infusions:   sodium chloride      sodium chloride 100 mL/hr at 24 1249     PRN Meds:.furosemide, sodium chloride flush, sodium chloride, ipratropium 0.5 mg-albuterol 2.5 mg     Prior to Admission medications    Medication Sig Start Date End Date Taking? Authorizing Provider   empagliflozin (JARDIANCE) 10 MG tablet Take 1 tablet by mouth daily 10/2/24  Yes Warren Osullivan MD   spironolactone (ALDACTONE) 25 MG tablet Take 1 tablet by mouth daily 24  Yes Warren Osullivan MD   amiodarone (CORDARONE) 200 MG tablet Take 1 tablet by mouth daily 24  Yes Warren Osullivan MD   torsemide (DEMADEX) 20 MG tablet Take 1 tablet by mouth in the morning and at bedtime 24  Yes Warren Osulilvan MD   metoprolol tartrate (LOPRESSOR) 50 MG tablet Take 1 tablet by mouth 2 times daily 24  Yes Warren Osullivan MD   fluticasone-umeclidin-vilant (TRELEGY ELLIPTA) 100-62.5-25 MCG/ACT AEPB inhaler Inhale 1 puff into the lungs daily 24  Yes Stephen Shen MD   apixaban (ELIQUIS) 5 MG TABS tablet Take 1 tablet by mouth 2 times daily 24  Yes Warren Osullivan MD   albuterol sulfate HFA (PROVENTIL HFA) 108 (90 Base) MCG/ACT inhaler Inhale 2 puffs into the lungs every 6 hours as needed for Wheezing  Patient not taking: Reported on 2024   Stephen Shen MD       Physical Examination:  Vitals:    24 1117   BP:    Pulse:    Resp:    Temp:    SpO2: 100%      In: 2714.6 [P.O.:470; I.V.:1946.1]  Out: 2150    Wt Readings from Last 3 Encounters:   24 56.3 kg (124 lb 1.9 oz)   24 54.8 kg (120 lb 12.8 oz)   24 54.4 kg (120 lb)     Temp  Av.8 °F (36.6 °C)  Min: 97.6 °F (36.4 °C)  Max: 98 °F (36.7 °C)  Pulse  Av.4  Min: 58  Max: 160  BP  Min: 82/62  Max: 123/71  SpO2  Av.3 %  Min: 81 %  Max: 100 %  FiO2   Av %  Min: 60 %  Max: 60  %    Intake/Output Summary (Last 24 hours) at 12/26/2024 1423  Last data filed at 12/26/2024 1115  Gross per 24 hour   Intake 2714.59 ml   Output 2150 ml   Net 564.59 ml       Constitutional: Oriented. No distress.   Cardiovascular: Tachycardic rate, Irregular rhythm, S1&S2.    Pulmonary/Chest:  Coarse BS.   Abdominal: Soft. No tenderness   Musculoskeletal: No edema    Neurological: Alert and oriented. Follows command    Active Hospital Problems    Diagnosis Date Noted    Generalized weakness [R53.1] 12/24/2024       Past Medical History:   Diagnosis Date    Abnormal weight loss     Chronic combined systolic and diastolic heart failure (HCC)     COPD with emphysema (HCC)     PAF (paroxysmal atrial fibrillation) (HCC)     Pericardial effusion         History reviewed. No pertinent surgical history.    No Known Allergies     reports that she quit smoking about 17 months ago. Her smoking use included cigarettes. She started smoking about 51 years ago. She has a 50 pack-year smoking history. She has been exposed to tobacco smoke. She has never used smokeless tobacco. She reports current alcohol use of about 1.0 standard drink of alcohol per week. She reports that she does not currently use drugs.     Discussed with cardiology team.   I independently reviewed / interpreted ECG, cardiac tracings/rhythm ECGs strips    Consult note reviewed.   I independently reviewed relevant and available cardiac diagnostic tests.     All questions and concerns were addressed to the patient/family. Alternatives to my treatment were discussed. I have discussed the above stated plan and the patient verbalized understanding and agreed with the plan.    NOTE: This report was transcribed using voice recognition software. Every effort was made to ensure accuracy, however, inadvertent computerized transcription errors may be present.     Myron Buchanan MD, MPH  [facility]   Office: [phone]  Fax: [phone]

## 2024-12-26 NOTE — PROGRESS NOTES
Spoke with Cristina BLANCO regarding PICC/midline insertion. Following the National KDOQI guidelines and [facility] policies clearance must be given to place a PICC when the patients has a GFR<60. The clearance may be given by an attending physician unless there is a nephrologist on the patient care team in which case approval must be given by them as well. An acknowledgement that the doctor giving approval needs to be placed in EPIC  as a nursing communication order. Please call when order and consent are obtained.

## 2024-12-26 NOTE — PROGRESS NOTES
[facility]    12/26/2024 8:48 AM        Name: Vane Martini .              Admitted: 12/24/2024  Primary Care Provider: No primary care provider on file. (Tel: None)    Brief Course: Admitted with fall, found to have rhabdo, influenza,        Subjective:    Patient started feeling poorly with increased SOB on Sunday. She fell and was too weak to get up. Her sister found her. She has no children and lives alone.     She was on 3.5L when I saw her this am. Sats were 86%. Went into rapid afib at 8:30. Had neb tx shortly before that. She does not feel any worse today than yesterday. Denies pain. Has occasional cough.     Reviewed interval ancillary notes    Current Medications  metoprolol (LOPRESSOR) injection 5 mg, Once  pantoprazole (PROTONIX) tablet 40 mg, QAM AC  methylPREDNISolone sodium succ (SOLU-MEDROL) 40 mg in sterile water 1 mL injection, Q6H  ipratropium 0.5 mg-albuterol 2.5 mg (DUONEB) nebulizer solution 1 Dose, Q4H WA RT  oseltamivir (TAMIFLU) capsule 30 mg, Daily  0.9 % sodium chloride infusion, Continuous  ipratropium 0.5 mg-albuterol 2.5 mg (DUONEB) nebulizer solution 1 Dose, Q4H PRN  amiodarone (CORDARONE) tablet 200 mg, Daily  apixaban (ELIQUIS) tablet 5 mg, BID  [Held by provider] empagliflozin (JARDIANCE) tablet 10 mg, Daily  [Held by provider] metoprolol tartrate (LOPRESSOR) tablet 50 mg, BID  [Held by provider] spironolactone (ALDACTONE) tablet 25 mg, Daily  [Held by provider] torsemide (DEMADEX) tablet 20 mg, BID  budesonide-formoterol (SYMBICORT) 160-4.5 MCG/ACT inhaler 2 puff, BID RT        Objective:  BP 99/62   Pulse 67   Temp 98 °F (36.7 °C) (Oral)   Resp 18   Ht 1.6 m (5' 3\")   Wt 56.3 kg (124 lb 1.9 oz)   SpO2 96%   BMI 21.99 kg/m²     Intake/Output Summary (Last 24 hours) at 12/26/2024 0848  Last data filed at 12/26/2024 0400  Gross per 24 hour   Intake 2474.59 ml   Output 1200 ml

## 2024-12-26 NOTE — PROGRESS NOTES
Initial assessment completed- see doc flowsheets. VSS. A&O x4. On 4 LPM O2 via NC- tolerating well. Able to make needs known. Occasional cough, remains SOB even in bed. No complaints of pain/discomfort. Care ongoing.

## 2024-12-26 NOTE — FLOWSHEET NOTE
PICC line education:    -Risks  -Benefits  -Alternatives  -Procedure    Discussed the above with patient, verbalized understanding, answered all questions. Provided with information on PICC care to review. PICC tip verified via 3CG (Ok to use). Reported off to patient's  Nurse ALAN BLANCO.

## 2024-12-26 NOTE — PROGRESS NOTES
Occupational Therapy and Physical Therapy  Vane Alvarez    Patient in the process of being transferred to CVU due to A fib with RVR.   Will continue to follow and evaluate once medically stable.    Thank you,  Юлия Doherty, OTR/L [id number]  Tiarra [place], [id number]

## 2024-12-26 NOTE — PROGRESS NOTES
Eliquis  - keep HOB > 30 deg, NPO  - CK, Cr improved will hold fluid for now  - agree with PICC line  - continue ICU monitoring, she is high risk for worsening and needing mechanical ventilation    Tried to call family contact no answer.     Critical Care time 32 (excluding procedures) - patient is at risk of significant morbidity / mortality from respiratory failure    Gagan Delgado MD  Pulmonary and Critical Care Medicine   [facility]

## 2024-12-26 NOTE — PROGRESS NOTES
Speech Language Pathology  HOLD    Vane Martini  1945    SLP eval and treat orders received. Attempted to see pt for dysphagia evaluation. Per discussion with RN, pt is currently on Bipap. Will hold evaluation and attempt to follow-up as schedule allows.     Thank you,     Donis Guerrier M.A., CCC-SLP [id number]  Speech-Language Pathologist

## 2024-12-27 LAB
ALBUMIN SERPL-MCNC: 2.7 G/DL (ref 3.4–5)
ALBUMIN/GLOB SERPL: 0.9 {RATIO} (ref 1.1–2.2)
ALP SERPL-CCNC: 93 U/L (ref 40–129)
ALT SERPL-CCNC: 74 U/L (ref 10–40)
ANION GAP SERPL CALCULATED.3IONS-SCNC: 8 MMOL/L (ref 3–16)
AST SERPL-CCNC: 47 U/L (ref 15–37)
BASOPHILS # BLD: 0 K/UL (ref 0–0.2)
BASOPHILS NFR BLD: 0.1 %
BILIRUB SERPL-MCNC: 0.4 MG/DL (ref 0–1)
BUN SERPL-MCNC: 21 MG/DL (ref 7–20)
CALCIUM SERPL-MCNC: 8.6 MG/DL (ref 8.3–10.6)
CHLORIDE SERPL-SCNC: 116 MMOL/L (ref 99–110)
CK SERPL-CCNC: 186 U/L (ref 26–192)
CO2 SERPL-SCNC: 25 MMOL/L (ref 21–32)
CREAT SERPL-MCNC: 0.7 MG/DL (ref 0.6–1.2)
DEPRECATED RDW RBC AUTO: 14 % (ref 12.4–15.4)
EOSINOPHIL # BLD: 0 K/UL (ref 0–0.6)
EOSINOPHIL NFR BLD: 0 %
GFR SERPLBLD CREATININE-BSD FMLA CKD-EPI: 88 ML/MIN/{1.73_M2}
GLUCOSE SERPL-MCNC: 123 MG/DL (ref 70–99)
HCT VFR BLD AUTO: 39.5 % (ref 36–48)
HGB BLD-MCNC: 13.3 G/DL (ref 12–16)
LYMPHOCYTES # BLD: 0.3 K/UL (ref 1–5.1)
LYMPHOCYTES NFR BLD: 3.4 %
MCH RBC QN AUTO: 32.1 PG (ref 26–34)
MCHC RBC AUTO-ENTMCNC: 33.6 G/DL (ref 31–36)
MCV RBC AUTO: 95.6 FL (ref 80–100)
MONOCYTES # BLD: 0.7 K/UL (ref 0–1.3)
MONOCYTES NFR BLD: 6.6 %
NEUTROPHILS # BLD: 9.1 K/UL (ref 1.7–7.7)
NEUTROPHILS NFR BLD: 89.9 %
PLATELET # BLD AUTO: 168 K/UL (ref 135–450)
PMV BLD AUTO: 8.5 FL (ref 5–10.5)
POTASSIUM SERPL-SCNC: 3.6 MMOL/L (ref 3.5–5.1)
PROT SERPL-MCNC: 5.6 G/DL (ref 6.4–8.2)
RBC # BLD AUTO: 4.13 M/UL (ref 4–5.2)
SODIUM SERPL-SCNC: 149 MMOL/L (ref 136–145)
WBC # BLD AUTO: 10.1 K/UL (ref 4–11)

## 2024-12-27 PROCEDURE — 6360000002 HC RX W HCPCS: Performed by: STUDENT IN AN ORGANIZED HEALTH CARE EDUCATION/TRAINING PROGRAM

## 2024-12-27 PROCEDURE — 2700000000 HC OXYGEN THERAPY PER DAY

## 2024-12-27 PROCEDURE — 2580000003 HC RX 258: Performed by: PHYSICIAN ASSISTANT

## 2024-12-27 PROCEDURE — 99232 SBSQ HOSP IP/OBS MODERATE 35: CPT

## 2024-12-27 PROCEDURE — 97165 OT EVAL LOW COMPLEX 30 MIN: CPT

## 2024-12-27 PROCEDURE — 97162 PT EVAL MOD COMPLEX 30 MIN: CPT

## 2024-12-27 PROCEDURE — 94761 N-INVAS EAR/PLS OXIMETRY MLT: CPT

## 2024-12-27 PROCEDURE — 2000000000 HC ICU R&B

## 2024-12-27 PROCEDURE — 97530 THERAPEUTIC ACTIVITIES: CPT

## 2024-12-27 PROCEDURE — 85025 COMPLETE CBC W/AUTO DIFF WBC: CPT

## 2024-12-27 PROCEDURE — 2500000003 HC RX 250 WO HCPCS: Performed by: STUDENT IN AN ORGANIZED HEALTH CARE EDUCATION/TRAINING PROGRAM

## 2024-12-27 PROCEDURE — 6360000002 HC RX W HCPCS: Performed by: PHYSICIAN ASSISTANT

## 2024-12-27 PROCEDURE — 99233 SBSQ HOSP IP/OBS HIGH 50: CPT | Performed by: STUDENT IN AN ORGANIZED HEALTH CARE EDUCATION/TRAINING PROGRAM

## 2024-12-27 PROCEDURE — 6370000000 HC RX 637 (ALT 250 FOR IP): Performed by: STUDENT IN AN ORGANIZED HEALTH CARE EDUCATION/TRAINING PROGRAM

## 2024-12-27 PROCEDURE — 82550 ASSAY OF CK (CPK): CPT

## 2024-12-27 PROCEDURE — 6370000000 HC RX 637 (ALT 250 FOR IP): Performed by: INTERNAL MEDICINE

## 2024-12-27 PROCEDURE — 94660 CPAP INITIATION&MGMT: CPT

## 2024-12-27 PROCEDURE — 94640 AIRWAY INHALATION TREATMENT: CPT

## 2024-12-27 PROCEDURE — 2500000003 HC RX 250 WO HCPCS: Performed by: PHYSICIAN ASSISTANT

## 2024-12-27 PROCEDURE — 87449 NOS EACH ORGANISM AG IA: CPT

## 2024-12-27 PROCEDURE — 80053 COMPREHEN METABOLIC PANEL: CPT

## 2024-12-27 PROCEDURE — 6370000000 HC RX 637 (ALT 250 FOR IP): Performed by: PHYSICIAN ASSISTANT

## 2024-12-27 RX ADMIN — SODIUM CHLORIDE, PRESERVATIVE FREE 10 ML: 5 INJECTION INTRAVENOUS at 19:57

## 2024-12-27 RX ADMIN — BUDESONIDE 500 MCG: 0.5 SUSPENSION RESPIRATORY (INHALATION) at 19:30

## 2024-12-27 RX ADMIN — AMIODARONE HYDROCHLORIDE 200 MG: 200 TABLET ORAL at 08:16

## 2024-12-27 RX ADMIN — METOPROLOL TARTRATE 50 MG: 50 TABLET, FILM COATED ORAL at 08:15

## 2024-12-27 RX ADMIN — AZITHROMYCIN MONOHYDRATE 250 MG: 500 INJECTION, POWDER, LYOPHILIZED, FOR SOLUTION INTRAVENOUS at 13:35

## 2024-12-27 RX ADMIN — WATER 40 MG: 1 INJECTION INTRAMUSCULAR; INTRAVENOUS; SUBCUTANEOUS at 13:24

## 2024-12-27 RX ADMIN — IPRATROPIUM BROMIDE AND ALBUTEROL SULFATE 1 DOSE: .5; 3 SOLUTION RESPIRATORY (INHALATION) at 16:22

## 2024-12-27 RX ADMIN — ARFORMOTEROL TARTRATE 15 MCG: 15 SOLUTION RESPIRATORY (INHALATION) at 07:43

## 2024-12-27 RX ADMIN — APIXABAN 5 MG: 5 TABLET, FILM COATED ORAL at 19:57

## 2024-12-27 RX ADMIN — IPRATROPIUM BROMIDE AND ALBUTEROL SULFATE 1 DOSE: .5; 3 SOLUTION RESPIRATORY (INHALATION) at 19:29

## 2024-12-27 RX ADMIN — OSELTAMIVIR PHOSPHATE 30 MG: 30 CAPSULE ORAL at 09:51

## 2024-12-27 RX ADMIN — OSELTAMIVIR PHOSPHATE 30 MG: 30 CAPSULE ORAL at 19:57

## 2024-12-27 RX ADMIN — SODIUM CHLORIDE, PRESERVATIVE FREE 10 ML: 5 INJECTION INTRAVENOUS at 08:17

## 2024-12-27 RX ADMIN — WATER 40 MG: 1 INJECTION INTRAMUSCULAR; INTRAVENOUS; SUBCUTANEOUS at 01:41

## 2024-12-27 RX ADMIN — METRONIDAZOLE 500 MG: 500 INJECTION, SOLUTION INTRAVENOUS at 17:07

## 2024-12-27 RX ADMIN — METRONIDAZOLE 500 MG: 500 INJECTION, SOLUTION INTRAVENOUS at 00:04

## 2024-12-27 RX ADMIN — METRONIDAZOLE 500 MG: 500 INJECTION, SOLUTION INTRAVENOUS at 08:20

## 2024-12-27 RX ADMIN — ARFORMOTEROL TARTRATE 15 MCG: 15 SOLUTION RESPIRATORY (INHALATION) at 19:42

## 2024-12-27 RX ADMIN — BUDESONIDE 500 MCG: 0.5 SUSPENSION RESPIRATORY (INHALATION) at 07:44

## 2024-12-27 RX ADMIN — APIXABAN 5 MG: 5 TABLET, FILM COATED ORAL at 08:16

## 2024-12-27 RX ADMIN — METRONIDAZOLE 500 MG: 500 INJECTION, SOLUTION INTRAVENOUS at 23:40

## 2024-12-27 RX ADMIN — IPRATROPIUM BROMIDE AND ALBUTEROL SULFATE 1 DOSE: .5; 3 SOLUTION RESPIRATORY (INHALATION) at 12:26

## 2024-12-27 RX ADMIN — IPRATROPIUM BROMIDE AND ALBUTEROL SULFATE 1 DOSE: .5; 3 SOLUTION RESPIRATORY (INHALATION) at 07:44

## 2024-12-27 RX ADMIN — WATER 1000 MG: 1 INJECTION INTRAMUSCULAR; INTRAVENOUS; SUBCUTANEOUS at 08:14

## 2024-12-27 RX ADMIN — PANTOPRAZOLE SODIUM 40 MG: 40 TABLET, DELAYED RELEASE ORAL at 08:16

## 2024-12-27 NOTE — CARE COORDINATION
Pt sleeping.     Pt is + for Influenza. Pt on 7 liters of oxygen at this time. Therapy recommending SNF for therapy and d/t drop in oxygen saturations when ambulating. Pt wears no oxygen at baseline per chart.    CM left SNF and HC lists at bedside along with this CM number and office number for the weekend.    CM called pt's niece and left vm to discuss d/c planning, recommendations and that lists are at bedside. CM also left on message my number and office number for weekend.     CM will continue to follow for discharge plan and needs.     Masha Rangel, RN, BSN  [phone]

## 2024-12-27 NOTE — PROGRESS NOTES
Speech Language Pathology  HOLD    Vane Martini  1945    Second attempt to complete SLP eval and treat orders. Chart reviewed, spoke with RN. Pt remains on Bipap. Will hold evaluation and attempt to follow-up as pt is appropriate.     Addendum: Checked back with RN this afternoon who reports pt to be kept strict NPO per pulmonology. Will hold today and re-attempt when pt is medically/clinically appropriate.    Thank you,     Shilpi Kumar MA CCC-SLP  Speech-Language Pathologist  SP. [id number]

## 2024-12-27 NOTE — PROGRESS NOTES
[facility] - Inpatient Rehabilitation Department   Phone: [phone]    Physical Therapy    [x] Initial Evaluation            [] Daily Treatment Note         [] Discharge Summary      Patient: Vane Martini   : 1945   MRN: [id number]   Date of Service:  2024  Admitting Diagnosis: Generalized weakness  Current Admission Summary: Patient started feeling poorly with increased SOB on . She fell and was too weak to get up. Her sister found her. She has no children and lives alone.   Past Medical History:  has a past medical history of Abnormal weight loss, Chronic combined systolic and diastolic heart failure (HCC), COPD with emphysema (HCC), PAF (paroxysmal atrial fibrillation) ([facility]), and Pericardial effusion.  Past Surgical History:  has no past surgical history on file.  Discharge Recommendations: Vane Martini scored a 17/24 on the AM-PAC short mobility form. Current research shows that an AM-PAC score of 17 or less is typically not associated with a discharge to the patient's home setting. Based on the patient's AM-PAC score and their current functional mobility deficits, it is recommended that the patient have 3-5 sessions per week of Physical Therapy at d/c to increase the patient's independence.  Please see assessment section for further patient specific details.    If patient discharges prior to next session this note will serve as a discharge summary.  Please see below for the latest assessment towards goals.    DME Required For Discharge: DME to be determined pending patient progress  Precautions/Restrictions: high fall risk, Isolation precautions  Weight Bearing Restrictions: no restrictions  [] Right Upper Extremity  [] Left Upper Extremity [] Right Lower Extremity  [] Left Lower Extremity     Required Braces/Orthotics: no braces required   [] Right  [] Left  Positional Restrictions:no positional restrictions    Pre-Admission Information   Lives With: alone    Type of  from the bed CGA. She required increased time and rest breaks between positions due to fatigue and decreasing O2 stats. After ambulation, O2 dropped to 84% on 8L of SpO2, however increased to 94% after 1 minute of rest. Patient is experiencing decreased endurance, pain, weakness, and SOB, which decreases her ability to be independent at home; therefore, patient would benefit from skilled therapy to address these concerns.   Safety Interventions: patient left in chair, chair alarm in place, call light within reach, gait belt, patient at risk for falls, and nurse notified    Plan  Frequency: 3-5 x/per week  Current Treatment Recommendations: balance training, functional mobility training, transfer training, gait training, stair training, endurance training, patient/caregiver education, ADL/self-care training, IADL training, home management training, and pain management    Goals  Patient Goals: return home independently   Short Term Goals:  Time Frame: upon discharge  Patient will complete bed mobility at modified independent   Patient will complete transfers at stand by assistance   Patient will ambulate 15 ft with use of rolling walker at contact guard assistance  Patient will ascend/descend 1 stairs with (R) ascending handrail at contact guard assistance    Above goals reviewed on 12/27/2024.  All goals are ongoing at this time unless indicated above.      Therapy Session Time      Individual Group Co-treatment   Time In     0828   Time Out     0907   Minutes     39     Timed Code Treatment Minutes:   24  Total Treatment Minutes:  39       Electronically Signed By: Ebony Winston, SPT    PT providing direct supervision during session and assisting in making skilled judgements throughout session.  Kerri Acosta PT, DPT [id number]

## 2024-12-27 NOTE — PLAN OF CARE
Problem: Chronic Conditions and Co-morbidities  Goal: Patient's chronic conditions and co-morbidity symptoms are monitored and maintained or improved  Outcome: Progressing     Problem: Safety - Adult  Goal: Free from fall injury  Outcome: Progressing     Problem: Respiratory - Adult  Goal: Achieves optimal ventilation and oxygenation  Outcome: Progressing     Problem: Cardiovascular - Adult  Goal: Maintains optimal cardiac output and hemodynamic stability  Outcome: Progressing  Goal: Absence of cardiac dysrhythmias or at baseline  Outcome: Progressing     Problem: Musculoskeletal - Adult  Goal: Return mobility to safest level of function  Outcome: Progressing     Problem: Skin/Tissue Integrity  Goal: Absence of new skin breakdown  Outcome: Progressing

## 2024-12-27 NOTE — PROGRESS NOTES
Pulmonary and Critical Care Medicine    CC: f/u respiratory failure    Date: 2024  Admit Date:  2024  Consult Requesting Physician: Barney Connolly MD     HPI     This is a 78 y/o F w/ a hx of COPD, Afib, CHF, who presented after a fall, admitted with acute hypoxic respiratory failure, Influenza A infection, rhabdomyolysis, transaminitis.    Interm History:   She is on 8L NC  Feeling better  Has some cough   Asking to eat     ROS: negative except as stated above    OBJECTIVE DATA       PHYSICAL EXAM:   Temp  Av.4 °F (36.3 °C)  Min: 97 °F (36.1 °C)  Max: 98 °F (36.7 °C)  Pulse  Av.7  Min: 66  Max: 138  BP  Min: 78/67  Max: 149/75  SpO2  Av.5 %  Min: 91 %  Max: 100 %  FiO2   Av.1 %  Min: 30 %  Max: 50 %    General: NAD  Neuro: awake and alert   Lung: expiratory course BS, crackles, non labored   Heart: regula rate    MEDICATIONS: Reviewed  Scheduled Meds:   budesonide  0.5 mg Nebulization BID RT    arformoterol tartrate  15 mcg Nebulization BID RT    cefTRIAXone (ROCEPHIN) IV  1,000 mg IntraVENous Q24H    oseltamivir  30 mg Oral BID    sodium chloride flush  5-40 mL IntraVENous 2 times per day    azithromycin (ZITHROMAX) 250 mg in sodium chloride 0.9 % 250 mL IVPB  250 mg IntraVENous Q24H    metroNIDAZOLE  500 mg IntraVENous Q8H    methylPREDNISolone  40 mg IntraVENous Q12H    pantoprazole  40 mg Oral QAM AC    ipratropium 0.5 mg-albuterol 2.5 mg  1 Dose Inhalation Q4H WA RT    amiodarone  200 mg Oral Daily    apixaban  5 mg Oral BID    [Held by provider] empagliflozin  10 mg Oral Daily    [Held by provider] metoprolol tartrate  50 mg Oral BID    [Held by provider] spironolactone  25 mg Oral Daily    [Held by provider] torsemide  20 mg Oral BID     Continuous Infusions:   sodium chloride       PRN Meds:.sodium chloride flush, sodium chloride, ipratropium 0.5 mg-albuterol 2.5 mg     LABS: Reviewed.   Recent Labs     24  1814 24  0513 24  0518    137 144   K

## 2024-12-27 NOTE — PROGRESS NOTES
Hospitalist Progress Note    Name:  Vane Martini    /Age/Sex: 1945  (79 y.o. female)  MRN & CSN:  [id number] & [id number]    PCP: No primary care provider on file.    Date of Admission: 2024    Patient Status:  Inpatient     Chief Complaint:   Chief Complaint   Patient presents with    Fall     [facility] ems from home due to unwitnessed fall at home, per EMS, pt was on the ground fo 2 hours. Per pt, she tripped and fell. Denies LOC. Also, per EMS, pt was SOB upon arrival with O2 in the 60s, pt was given duoneb en route. Pt alert and oriented during triage        Hospital Course:   Patient started feeling poorly with increased SOB on . She fell and was too weak to get up. Her sister found her. She has no children and lives alone.      She was on 3.5L when I saw her this am. Sats were 86%. Went into rapid afib at 8:30. Had neb tx shortly before that. She does not feel any worse today than yesterday. Denies pain. Has occasional cough.       She is noted to be flu  A positive   Patient was transferred to the ICU for worsening respiratory failure   She was placed on BiPAP    Subjective:  Today is:  Hospital Day: 4.  Patient seen and examined in CVU-2903/2903-.     She is doing better today  Seen off of BiPAP       Medications:  Reviewed    Infusion Medications    sodium chloride       Scheduled Medications    [START ON 2024] cefTRIAXone (ROCEPHIN) IV  1,000 mg IntraVENous Q24H    budesonide  0.5 mg Nebulization BID RT    arformoterol tartrate  15 mcg Nebulization BID RT    oseltamivir  30 mg Oral BID    sodium chloride flush  5-40 mL IntraVENous 2 times per day    azithromycin (ZITHROMAX) 250 mg in sodium chloride 0.9 % 250 mL IVPB  250 mg IntraVENous Q24H    metroNIDAZOLE  500 mg IntraVENous Q8H    methylPREDNISolone  40 mg IntraVENous Q12H    pantoprazole  40 mg Oral QAM AC    ipratropium 0.5 mg-albuterol 2.5 mg  1 Dose Inhalation Q4H WA RT    amiodarone  200 mg Oral Daily    apixaban

## 2024-12-27 NOTE — PROGRESS NOTES
Living  Lower Extremity Dressing: stand by assistance Increased time to complete task completed doffing and donning socks in sitting.   General Comments: Pt too fatigued and SOB to complete additional ADLs this session. Will continue to assess throughout POC.   Instrumental Activities of Daily Living  No IADL completed on this date.    Functional Mobility  Bed Mobility:  Supine to Sit: contact guard assistance  Scooting: contact guard assistance  Comments: increased time and effort. Pt SOB throughout.   Transfers:  Sit to stand transfer:contact guard assistance  Stand to sit transfer: contact guard assistance  Comments: RW for UE support. Cues for hand placement. Pt SOB throughout.   Functional Mobility  Functional Mobility Activity: 5' from EOB to recliner.   Device Use: rolling walker  Required Assistance: contact guard assistance  Comments: SOB throughout. Unable to participate in additional functional mobility secondary to weakness, fatigue, and SOB with deconditioning. Increased time.   Balance:  Static Sitting Balance: fair (+): maintains balance at SBA/supervision without use of UE support  Dynamic Sitting Balance: fair (+): maintains balance at SBA/supervision without use of UE support  Static Standing Balance: fair (-): maintains balance at CGA with use of UE support  Dynamic Standing Balance: fair (-): maintains balance at CGA with use of UE support  Comments:    Other Therapeutic Interventions    Functional Outcomes  AM-PAC Inpatient Daily Activity Raw Score: 16                                    Cognition  WFL  Orientation:    alert and oriented x 4  Command Following:   WFL     Education  Barriers To Learning: hearing  Patient Education: patient educated on goals, OT role and benefits, plan of care, ADL adaptive strategies, IADL safety, adaptive device training, low vision education, transfer training, discharge recommendations  Learning Assessment:  patient verbalizes understanding, would benefit from  continued reinforcement    Increased time required for extensive education in current level of function, disease process, and discharge planning. Increased time required to address all pt questions and concerns regarding POC and adaptive ADL techniques/strategies. Pt acknowledged and verbalized understanding.     Assessment  Activity Tolerance: Pt limited by SOB throughout session. Pt on 6-7L high flow O2 and saturating 91-93% at rest and with minimal activity. Pt only able to tolerate bed mobility and 5' ambulation to recliner.   Impairments Requiring Therapeutic Intervention: decreased functional mobility, decreased ADL status, decreased strength, decreased endurance, decreased sensation, decreased balance, decreased IADL  Prognosis: good  Clinical Assessment: The patient is a 79 y.o. female who presents below their baseline level of function due to above deficits, associated with Generalized weakness. Typically, pt is independent with all ADLs and functional mobility. She does not typically use DME. Currently, pt is CGA for all functional transfers and mobility. She demonstrates LB ADLs in sitting with set up+CGA. Continued OT indicated in order to promote return to PLOF    Safety Interventions: patient left in chair, chair alarm in place, call light within reach, and nurse notified    Plan  Frequency: 3-5 x/per week  Current Treatment Recommendations: strengthening, balance training, functional mobility training, transfer training, gait training, stair training, endurance training, ADL/self-care training, IADL training, and safety education    Goals  Patient Goals: Return to home   Short Term Goals:  Time Frame: Discharge  Patient will complete upper body ADL at supervision   Patient will complete lower body ADL at stand by assistance   Patient will complete toileting at stand by assistance   Patient will complete grooming at supervision   Patient will complete functional transfers at stand by assistance

## 2024-12-27 NOTE — PROGRESS NOTES
Component Value Date/Time    CHOL 187 08/25/2023 10:35 AM    HDL 66 08/25/2023 10:35 AM    TRIG 133 08/25/2023 10:35 AM       ECG:   Atrial fibrillation with RVR    Echo 12/26/2024    Left Ventricle: Normal left ventricular systolic function with a visually estimated EF of 55 - 60%. Left ventricle size is normal. Findings consistent with mild concentric hypertrophy. Unable to assess wall motion.    Right Ventricle: Right ventricle size is normal. Normal systolic function.    Mitral Valve: Mild regurgitation.    Tricuspid Valve: Mild regurgitation. Unable to assess RVSP due to inadequate or insignificant tricuspid regurgitation.    Left Atrium: Left atrium is severely dilated.    Right Atrium: Catheter present in the right atrium. Lead present in the right atrium. Right atrium is dilated.    Image quality is technically difficult. Technically difficult study due to patient's heart rhythm.    Tachycardic throughout exam.    Stress test:   None    CXR  New right greater than left multifocal fluffy airspace opacities. Findings  are concerning for multifocal pneumonia.      Scheduled Meds:   budesonide  0.5 mg Nebulization BID RT    arformoterol tartrate  15 mcg Nebulization BID RT    cefTRIAXone (ROCEPHIN) IV  1,000 mg IntraVENous Q24H    oseltamivir  30 mg Oral BID    sodium chloride flush  5-40 mL IntraVENous 2 times per day    azithromycin (ZITHROMAX) 250 mg in sodium chloride 0.9 % 250 mL IVPB  250 mg IntraVENous Q24H    metroNIDAZOLE  500 mg IntraVENous Q8H    methylPREDNISolone  40 mg IntraVENous Q12H    pantoprazole  40 mg Oral QAM AC    ipratropium 0.5 mg-albuterol 2.5 mg  1 Dose Inhalation Q4H WA RT    amiodarone  200 mg Oral Daily    apixaban  5 mg Oral BID    [Held by provider] empagliflozin  10 mg Oral Daily    [Held by provider] metoprolol tartrate  50 mg Oral BID    [Held by provider] spironolactone  25 mg Oral Daily    [Held by provider] torsemide  20 mg Oral BID     Continuous Infusions:   sodium  1319.69 ml   Output 1650 ml   Net -330.31 ml       Constitutional: Oriented. No distress. Frail appearing  Cardiovascular: Normal rate, regular rhythm, S1&S2.    Pulmonary/Chest: Diminished respiratory sounds.     Abdominal: Soft. No tenderness   Musculoskeletal: No edema    Neurological: Alert and oriented. Follows command    Active Hospital Problems    Diagnosis Date Noted    Fall [W19.XXXA] 12/26/2024    Generalized weakness [R53.1] 12/24/2024    Atrial fibrillation with rapid ventricular response (HCC) [I48.91] 07/25/2023       Scheduled Meds:   budesonide  0.5 mg Nebulization BID RT    arformoterol tartrate  15 mcg Nebulization BID RT    cefTRIAXone (ROCEPHIN) IV  1,000 mg IntraVENous Q24H    oseltamivir  30 mg Oral BID    sodium chloride flush  5-40 mL IntraVENous 2 times per day    azithromycin (ZITHROMAX) 250 mg in sodium chloride 0.9 % 250 mL IVPB  250 mg IntraVENous Q24H    metroNIDAZOLE  500 mg IntraVENous Q8H    methylPREDNISolone  40 mg IntraVENous Q12H    pantoprazole  40 mg Oral QAM AC    ipratropium 0.5 mg-albuterol 2.5 mg  1 Dose Inhalation Q4H WA RT    amiodarone  200 mg Oral Daily    apixaban  5 mg Oral BID    [Held by provider] empagliflozin  10 mg Oral Daily    [Held by provider] metoprolol tartrate  50 mg Oral BID    [Held by provider] spironolactone  25 mg Oral Daily    [Held by provider] torsemide  20 mg Oral BID     Continuous Infusions:   sodium chloride      amiodarone 0.5 mg/min (12/27/24 0133)     PRN Meds:.sodium chloride flush, sodium chloride, ipratropium 0.5 mg-albuterol 2.5 mg   No Known Allergies    Greg Perdue PA-C  [facility]  [phone]    All questions and concerns were addressed to the patient/family. Alternatives to my treatment were discussed. I have discussed the above stated plan and the patient verbalized understanding and agreed with the plan.

## 2024-12-27 NOTE — CARE COORDINATION
12/27/24 1148   Service Assessment   Patient Orientation Unable to Assess;Other (see comment)  (pt sleeping)

## 2024-12-28 LAB
ANION GAP SERPL CALCULATED.3IONS-SCNC: 12 MMOL/L (ref 3–16)
BUN SERPL-MCNC: 20 MG/DL (ref 7–20)
CALCIUM SERPL-MCNC: 8.4 MG/DL (ref 8.3–10.6)
CHLORIDE SERPL-SCNC: 118 MMOL/L (ref 99–110)
CO2 SERPL-SCNC: 23 MMOL/L (ref 21–32)
CREAT SERPL-MCNC: 0.7 MG/DL (ref 0.6–1.2)
DEPRECATED RDW RBC AUTO: 14.2 % (ref 12.4–15.4)
GFR SERPLBLD CREATININE-BSD FMLA CKD-EPI: 88 ML/MIN/{1.73_M2}
GLUCOSE SERPL-MCNC: 120 MG/DL (ref 70–99)
HCT VFR BLD AUTO: 39.1 % (ref 36–48)
HGB BLD-MCNC: 13 G/DL (ref 12–16)
MCH RBC QN AUTO: 32.1 PG (ref 26–34)
MCHC RBC AUTO-ENTMCNC: 33.2 G/DL (ref 31–36)
MCV RBC AUTO: 96.8 FL (ref 80–100)
PLATELET # BLD AUTO: 160 K/UL (ref 135–450)
PMV BLD AUTO: 8.6 FL (ref 5–10.5)
POTASSIUM SERPL-SCNC: 3.6 MMOL/L (ref 3.5–5.1)
RBC # BLD AUTO: 4.04 M/UL (ref 4–5.2)
SODIUM SERPL-SCNC: 153 MMOL/L (ref 136–145)
WBC # BLD AUTO: 7.6 K/UL (ref 4–11)

## 2024-12-28 PROCEDURE — 92526 ORAL FUNCTION THERAPY: CPT

## 2024-12-28 PROCEDURE — 2500000003 HC RX 250 WO HCPCS: Performed by: STUDENT IN AN ORGANIZED HEALTH CARE EDUCATION/TRAINING PROGRAM

## 2024-12-28 PROCEDURE — 94660 CPAP INITIATION&MGMT: CPT

## 2024-12-28 PROCEDURE — 85027 COMPLETE CBC AUTOMATED: CPT

## 2024-12-28 PROCEDURE — 2500000003 HC RX 250 WO HCPCS: Performed by: PHYSICIAN ASSISTANT

## 2024-12-28 PROCEDURE — 6370000000 HC RX 637 (ALT 250 FOR IP): Performed by: STUDENT IN AN ORGANIZED HEALTH CARE EDUCATION/TRAINING PROGRAM

## 2024-12-28 PROCEDURE — 6360000002 HC RX W HCPCS: Performed by: STUDENT IN AN ORGANIZED HEALTH CARE EDUCATION/TRAINING PROGRAM

## 2024-12-28 PROCEDURE — 2700000000 HC OXYGEN THERAPY PER DAY

## 2024-12-28 PROCEDURE — 94640 AIRWAY INHALATION TREATMENT: CPT

## 2024-12-28 PROCEDURE — 2000000000 HC ICU R&B

## 2024-12-28 PROCEDURE — 94761 N-INVAS EAR/PLS OXIMETRY MLT: CPT

## 2024-12-28 PROCEDURE — 80048 BASIC METABOLIC PNL TOTAL CA: CPT

## 2024-12-28 PROCEDURE — 6370000000 HC RX 637 (ALT 250 FOR IP): Performed by: PHYSICIAN ASSISTANT

## 2024-12-28 PROCEDURE — 6360000002 HC RX W HCPCS: Performed by: PHYSICIAN ASSISTANT

## 2024-12-28 PROCEDURE — 92610 EVALUATE SWALLOWING FUNCTION: CPT

## 2024-12-28 PROCEDURE — 2580000003 HC RX 258: Performed by: STUDENT IN AN ORGANIZED HEALTH CARE EDUCATION/TRAINING PROGRAM

## 2024-12-28 PROCEDURE — 99233 SBSQ HOSP IP/OBS HIGH 50: CPT | Performed by: STUDENT IN AN ORGANIZED HEALTH CARE EDUCATION/TRAINING PROGRAM

## 2024-12-28 RX ORDER — DEXTROSE MONOHYDRATE 50 MG/ML
INJECTION, SOLUTION INTRAVENOUS CONTINUOUS
Status: DISCONTINUED | OUTPATIENT
Start: 2024-12-28 | End: 2024-12-31 | Stop reason: HOSPADM

## 2024-12-28 RX ADMIN — IPRATROPIUM BROMIDE AND ALBUTEROL SULFATE 1 DOSE: .5; 3 SOLUTION RESPIRATORY (INHALATION) at 20:13

## 2024-12-28 RX ADMIN — IPRATROPIUM BROMIDE AND ALBUTEROL SULFATE 1 DOSE: .5; 3 SOLUTION RESPIRATORY (INHALATION) at 15:09

## 2024-12-28 RX ADMIN — ARFORMOTEROL TARTRATE 15 MCG: 15 SOLUTION RESPIRATORY (INHALATION) at 08:12

## 2024-12-28 RX ADMIN — OSELTAMIVIR PHOSPHATE 30 MG: 30 CAPSULE ORAL at 20:41

## 2024-12-28 RX ADMIN — WATER 40 MG: 1 INJECTION INTRAMUSCULAR; INTRAVENOUS; SUBCUTANEOUS at 14:07

## 2024-12-28 RX ADMIN — PANTOPRAZOLE SODIUM 40 MG: 40 TABLET, DELAYED RELEASE ORAL at 08:48

## 2024-12-28 RX ADMIN — BUDESONIDE 500 MCG: 0.5 SUSPENSION RESPIRATORY (INHALATION) at 20:13

## 2024-12-28 RX ADMIN — SODIUM CHLORIDE 3000 MG: 900 INJECTION INTRAVENOUS at 11:03

## 2024-12-28 RX ADMIN — OSELTAMIVIR PHOSPHATE 30 MG: 30 CAPSULE ORAL at 09:16

## 2024-12-28 RX ADMIN — METRONIDAZOLE 500 MG: 500 INJECTION, SOLUTION INTRAVENOUS at 08:49

## 2024-12-28 RX ADMIN — SODIUM CHLORIDE 3000 MG: 900 INJECTION INTRAVENOUS at 17:41

## 2024-12-28 RX ADMIN — SODIUM CHLORIDE 3000 MG: 900 INJECTION INTRAVENOUS at 23:31

## 2024-12-28 RX ADMIN — SODIUM CHLORIDE, PRESERVATIVE FREE 10 ML: 5 INJECTION INTRAVENOUS at 08:46

## 2024-12-28 RX ADMIN — IPRATROPIUM BROMIDE AND ALBUTEROL SULFATE 1 DOSE: .5; 3 SOLUTION RESPIRATORY (INHALATION) at 11:24

## 2024-12-28 RX ADMIN — AMIODARONE HYDROCHLORIDE 200 MG: 200 TABLET ORAL at 08:47

## 2024-12-28 RX ADMIN — APIXABAN 5 MG: 5 TABLET, FILM COATED ORAL at 08:46

## 2024-12-28 RX ADMIN — WATER 1000 MG: 1 INJECTION INTRAMUSCULAR; INTRAVENOUS; SUBCUTANEOUS at 08:46

## 2024-12-28 RX ADMIN — SODIUM CHLORIDE, PRESERVATIVE FREE 10 ML: 5 INJECTION INTRAVENOUS at 20:42

## 2024-12-28 RX ADMIN — BUDESONIDE 500 MCG: 0.5 SUSPENSION RESPIRATORY (INHALATION) at 08:13

## 2024-12-28 RX ADMIN — WATER 40 MG: 1 INJECTION INTRAMUSCULAR; INTRAVENOUS; SUBCUTANEOUS at 01:33

## 2024-12-28 RX ADMIN — APIXABAN 5 MG: 5 TABLET, FILM COATED ORAL at 20:41

## 2024-12-28 RX ADMIN — IPRATROPIUM BROMIDE AND ALBUTEROL SULFATE 1 DOSE: .5; 3 SOLUTION RESPIRATORY (INHALATION) at 08:12

## 2024-12-28 RX ADMIN — ARFORMOTEROL TARTRATE 15 MCG: 15 SOLUTION RESPIRATORY (INHALATION) at 20:14

## 2024-12-28 RX ADMIN — DEXTROSE MONOHYDRATE: 50 INJECTION, SOLUTION INTRAVENOUS at 11:07

## 2024-12-28 ASSESSMENT — PAIN SCALES - GENERAL: PAINLEVEL_OUTOF10: 0

## 2024-12-28 NOTE — RT PROTOCOL NOTE
RT Nebulizer Bronchodilator Protocol Note    There is a bronchodilator order in the chart from a provider indicating to follow the RT Bronchodilator Protocol and there is an “Initiate RT Bronchodilator Protocol” order as well (see protocol at bottom of note).    CXR Findings:  XR CHEST PORTABLE    Result Date: 12/26/2024  New right greater than left multifocal fluffy airspace opacities. Findings are concerning for multifocal pneumonia.       The findings from the last RT Protocol Assessment were as follows:  Smoking: Chronic pulmonary disease  Respiratory Pattern: Mild dyspnea at rest, irregular pattern, or RR 21-25 bpm  Breath Sounds: Inspiratory and expiratory or bilateral wheezing and/or rhonchi  Cough: Strong, productive  Indication for Bronchodilator Therapy: Decreased or absent breath sounds, On home bronchodilators  Bronchodilator Assessment Score: 13    Aerosolized bronchodilator medication orders have been revised according to the RT Nebulizer Bronchodilator Protocol below.    Respiratory Therapist to perform RT Therapy Protocol Assessment initially then follow the protocol.  Repeat RT Therapy Protocol Assessment PRN for score 0-3 or on second treatment, BID, and PRN for scores above 3.    No Indications - adjust the frequency to every 6 hours PRN wheezing or bronchospasm, if no treatments needed after 48 hours then discontinue using Per Protocol order mode.     If indication present, adjust the RT bronchodilator orders based on the Bronchodilator Assessment Score as indicated below.  If a patient is on this medication at home then do not decrease Frequency below that used at home.    0-3 - enter or revise RT bronchodilator order(s) to equivalent RT Bronchodilator order with Frequency of every 4 hours PRN for wheezing or increased work of breathing using Per Protocol order mode.       4-6 - enter or revise RT Bronchodilator order(s) to two equivalent RT bronchodilator orders with one order with BID

## 2024-12-28 NOTE — PROGRESS NOTES
Hospitalist Progress Note    Name:  Vane Martini    /Age/Sex: 1945  (79 y.o. female)  MRN & CSN:  [id number] & [id number]    PCP: No primary care provider on file.    Date of Admission: 2024    Patient Status:  Inpatient     Chief Complaint:   Chief Complaint   Patient presents with    Fall     [place] ems from home due to unwitnessed fall at home, per EMS, pt was on the ground fo 2 hours. Per pt, she tripped and fell. Denies LOC. Also, per EMS, pt was SOB upon arrival with O2 in the 60s, pt was given duoneb en route. Pt alert and oriented during triage        Hospital Course:   Patient started feeling poorly with increased SOB on . She fell and was too weak to get up. Her sister found her. She has no children and lives alone.      She was on 3.5L when I saw her this am. Sats were 86%. Went into rapid afib at 8:30. Had neb tx shortly before that. She does not feel any worse today than yesterday. Denies pain. Has occasional cough.       She is noted to be flu  A positive   Patient was transferred to the ICU for worsening respiratory failure   She was placed on BiPAP    Subjective:  Today is:  Hospital Day: 5.  Patient seen and examined in CVU-[id number]/[id number]-.     She is doing better today  Seen off of BiPAP       Medications:  Reviewed    Infusion Medications    dextrose 75 mL/hr at 24 1107    sodium chloride       Scheduled Medications    ampicillin-sulbactam  3,000 mg IntraVENous Q6H    budesonide  0.5 mg Nebulization BID RT    arformoterol tartrate  15 mcg Nebulization BID RT    oseltamivir  30 mg Oral BID    sodium chloride flush  5-40 mL IntraVENous 2 times per day    methylPREDNISolone  40 mg IntraVENous Q12H    pantoprazole  40 mg Oral QAM AC    ipratropium 0.5 mg-albuterol 2.5 mg  1 Dose Inhalation Q4H WA RT    amiodarone  200 mg Oral Daily    apixaban  5 mg Oral BID    [Held by provider] empagliflozin  10 mg Oral Daily    [Held by provider] metoprolol tartrate  50 mg Oral BID

## 2024-12-28 NOTE — FLOWSHEET NOTE
12/28/24 0800   Vitals   Temp 98.4 °F (36.9 °C)   Temp Source Temporal   Pulse 69   Heart Rate Source Monitor   Respirations 23   /69   MAP (Calculated) 86   MAP (mmHg) 85   BP Location Right Arm   BP Upper/Lower Upper   BP Method Automatic   Patient Position Semi fowlers   Cardiac Rhythm Sinus rhythm   Pain Assessment   Pain Assessment None - Denies Pain   Oxygen Therapy   SpO2 96 %   Pulse Oximetry Type Continuous   Pulse Oximeter Device Mode Continuous   Pulse Oximeter Device Location Finger   O2 Device High flow nasal cannula   O2 Flow Rate (L/min) 8 L/min     Shift assessment complete- see complex assessment in flowsheet. VSS. Pt alert and oriented x4. Speech therapy seen pt, they reccommended pt to be NPO but pt can have ice chips and applesauce with medications and reevaluate tomorrow. Medications administered per MAR with applesauce. POC discussed with pt, no further questions or concerns at this time. Bed in lowest position, call light within reach, bed alarm on.   Electronically signed by Kat Benitez RN on 12/28/2024 at 10:11 AM

## 2024-12-28 NOTE — PLAN OF CARE
Problem: Chronic Conditions and Co-morbidities  Goal: Patient's chronic conditions and co-morbidity symptoms are monitored and maintained or improved  Outcome: Progressing     Problem: Safety - Adult  Goal: Free from fall injury  Outcome: Progressing     Problem: Respiratory - Adult  Goal: Achieves optimal ventilation and oxygenation  Outcome: Progressing

## 2024-12-28 NOTE — PROGRESS NOTES
PO intake. Following successive drinks of thin liquids pt with significantly prolonged coughing episode, elevated respiratory rate and O2 saturations below 88. Due to current respiratory status (high flow O2, respiratory above 25 and decreased cough strength) pt demonstrates increased risk for aspiration with intake. At the bedside she exhibits s/s of oropharyngeal dysphagia with potential s/s of penetration/aspiration (increased congestion and coughing, changes to respiratory status). Due to current respiratory status and suspected oropharyngeal dysphagia at bedside would recommend NPO with the exception of meds with puree, limited ice chips, small single sips of thin water via cup for comfort. Hold if respiratory rate is above 25. Assessment of swallowing is to be ongoing.     Patient Positioning: Upright in bed     Diet level prior to evaluation: NPO        Respiratory Status:   O2 via nasal cannula     Dentition:  Adequate dentition     Baseline Vocal Quality:  Breath Support    Volitional Cough:  Elicited: Strong  and Congested     Volitional Swallow:   []Absent   []Delayed     []Adequate     [x]Required use of drink     Oral Mechanism Exam:  Within functional limits        Oral Phase: Mild   Suspected premature bolus loss   Prolonged mastication     Pharyngeal Phase: Mild  and Moderate   Suspected pharyngeal pooling  Suspected decreased laryngeal elevation  Multiple swallows  Coughing   Throat Clearing   Change in respiratory status    Dysphagia risk factors:   advanced age, current respiratory status, and co-morbidities  Risk factors for developing adverse outcomes to aspiration:   reduced ambulation and respiratory system compromise  Eating Assistance:   Setup or clean-up assistance         Goals     Goals:   Dysphagia Goals: Pt will functionally tolerate ongoing assessment of swallow function with diet to be determined as indicated   Pt will advance to least restrictive diet as indicated     Above goals  reviewed on 12/28/2024.  All goals are ongoing at this time unless indicated above.       POC/Education     Dysphagia Therapeutic Intervention:  Patient/Family Education , Therapeutic Trials with SLP     Plan of care: 3-5 times per week during acute care stay.      Education:  Provided education regarding role of SLP, results of assessment, recommendations and general speech pathology plan of care:  Pt verbalized understanding and agreement     If patient discharges prior to next visit, this note will serve as discharge.     Treatment time:  Timed Code Treatment Minutes: 0  Total Treatment Time Minutes: 25    Electronically signed by:    Tiarra Kinney MA CCC-SLP #[id number]  Speech Language Pathologist

## 2024-12-28 NOTE — PROGRESS NOTES
Pulmonary and Critical Care Medicine    CC: f/u respiratory failure    Date: 2024  Admit Date:  2024  Consult Requesting Physician: Barney Connolly MD     HPI     This is a 80 y/o F w/ a hx of COPD, Afib, CHF, who presented after a fall, admitted with acute hypoxic respiratory failure, Influenza A infection, rhabdomyolysis, transaminitis.    Interm History:   She is down to 5L NC  Feeling much better  Requesting to eat  Evaluate by SLP - suggest NPO except meds for now   Has cough non productive mostly     ROS: negative except as stated above    OBJECTIVE DATA       PHYSICAL EXAM:   Temp  Av.6 °F (37 °C)  Min: 98.4 °F (36.9 °C)  Max: 98.8 °F (37.1 °C)  Pulse  Av.2  Min: 51  Max: 81  BP  Min: 100/62  Max: 132/116  SpO2  Av.9 %  Min: 90 %  Max: 97 %  FiO2   Av %  Min: 30 %  Max: 30 %    General: NAD  Neuro: awake and alert   Lung: course and wheezing, equal non labored  Heart: regular rate    MEDICATIONS: Reviewed  Scheduled Meds:   budesonide  0.5 mg Nebulization BID RT    arformoterol tartrate  15 mcg Nebulization BID RT    oseltamivir  30 mg Oral BID    sodium chloride flush  5-40 mL IntraVENous 2 times per day    azithromycin (ZITHROMAX) 250 mg in sodium chloride 0.9 % 250 mL IVPB  250 mg IntraVENous Q24H    metroNIDAZOLE  500 mg IntraVENous Q8H    methylPREDNISolone  40 mg IntraVENous Q12H    pantoprazole  40 mg Oral QAM AC    ipratropium 0.5 mg-albuterol 2.5 mg  1 Dose Inhalation Q4H WA RT    amiodarone  200 mg Oral Daily    apixaban  5 mg Oral BID    [Held by provider] empagliflozin  10 mg Oral Daily    [Held by provider] metoprolol tartrate  50 mg Oral BID    [Held by provider] spironolactone  25 mg Oral Daily    [Held by provider] torsemide  20 mg Oral BID     Continuous Infusions:   sodium chloride       PRN Meds:.sodium chloride flush, sodium chloride, ipratropium 0.5 mg-albuterol 2.5 mg     LABS: Reviewed.   Recent Labs     24  0518 24  1322 24  0430

## 2024-12-28 NOTE — PROGRESS NOTES
12/28/24 0228   RT Protocol   History Pulmonary Disease 2   Respiratory pattern 4   Breath sounds 6   Cough 1   Indications for Bronchodilator Therapy Decreased or absent breath sounds;On home bronchodilators   Bronchodilator Assessment Score 13

## 2024-12-29 ENCOUNTER — APPOINTMENT (OUTPATIENT)
Dept: GENERAL RADIOLOGY | Age: 79
DRG: 193 | End: 2024-12-29
Payer: MEDICARE

## 2024-12-29 LAB
ANION GAP SERPL CALCULATED.3IONS-SCNC: 10 MMOL/L (ref 3–16)
APTT BLD: 29.1 SEC (ref 22.1–36.4)
BUN SERPL-MCNC: 14 MG/DL (ref 7–20)
CALCIUM SERPL-MCNC: 8.3 MG/DL (ref 8.3–10.6)
CHLORIDE SERPL-SCNC: 114 MMOL/L (ref 99–110)
CO2 SERPL-SCNC: 24 MMOL/L (ref 21–32)
CREAT SERPL-MCNC: 0.7 MG/DL (ref 0.6–1.2)
DEPRECATED RDW RBC AUTO: 14.3 % (ref 12.4–15.4)
DEPRECATED RDW RBC AUTO: 14.3 % (ref 12.4–15.4)
GFR SERPLBLD CREATININE-BSD FMLA CKD-EPI: 88 ML/MIN/{1.73_M2}
GLUCOSE SERPL-MCNC: 140 MG/DL (ref 70–99)
HCT VFR BLD AUTO: 39.3 % (ref 36–48)
HCT VFR BLD AUTO: 39.6 % (ref 36–48)
HGB BLD-MCNC: 13.1 G/DL (ref 12–16)
HGB BLD-MCNC: 13.2 G/DL (ref 12–16)
INR PPP: 2.24 (ref 0.85–1.15)
LEGIONELLA AG UR QL: NORMAL
MCH RBC QN AUTO: 31.8 PG (ref 26–34)
MCH RBC QN AUTO: 32.1 PG (ref 26–34)
MCHC RBC AUTO-ENTMCNC: 33.2 G/DL (ref 31–36)
MCHC RBC AUTO-ENTMCNC: 33.3 G/DL (ref 31–36)
MCV RBC AUTO: 95.4 FL (ref 80–100)
MCV RBC AUTO: 96.5 FL (ref 80–100)
PLATELET # BLD AUTO: 177 K/UL (ref 135–450)
PLATELET # BLD AUTO: 187 K/UL (ref 135–450)
PMV BLD AUTO: 8.2 FL (ref 5–10.5)
PMV BLD AUTO: 8.2 FL (ref 5–10.5)
POTASSIUM SERPL-SCNC: 3.1 MMOL/L (ref 3.5–5.1)
PROTHROMBIN TIME: 24.8 SEC (ref 11.9–14.9)
RBC # BLD AUTO: 4.11 M/UL (ref 4–5.2)
RBC # BLD AUTO: 4.12 M/UL (ref 4–5.2)
S PNEUM AG UR QL: NORMAL
SODIUM SERPL-SCNC: 148 MMOL/L (ref 136–145)
WBC # BLD AUTO: 11.3 K/UL (ref 4–11)
WBC # BLD AUTO: 9.6 K/UL (ref 4–11)

## 2024-12-29 PROCEDURE — 6360000002 HC RX W HCPCS: Performed by: INTERNAL MEDICINE

## 2024-12-29 PROCEDURE — 71045 X-RAY EXAM CHEST 1 VIEW: CPT

## 2024-12-29 PROCEDURE — 85730 THROMBOPLASTIN TIME PARTIAL: CPT

## 2024-12-29 PROCEDURE — 85027 COMPLETE CBC AUTOMATED: CPT

## 2024-12-29 PROCEDURE — 2500000003 HC RX 250 WO HCPCS: Performed by: PHYSICIAN ASSISTANT

## 2024-12-29 PROCEDURE — 6370000000 HC RX 637 (ALT 250 FOR IP): Performed by: STUDENT IN AN ORGANIZED HEALTH CARE EDUCATION/TRAINING PROGRAM

## 2024-12-29 PROCEDURE — 6360000002 HC RX W HCPCS: Performed by: PHYSICIAN ASSISTANT

## 2024-12-29 PROCEDURE — 94660 CPAP INITIATION&MGMT: CPT

## 2024-12-29 PROCEDURE — 85610 PROTHROMBIN TIME: CPT

## 2024-12-29 PROCEDURE — 6360000002 HC RX W HCPCS: Performed by: STUDENT IN AN ORGANIZED HEALTH CARE EDUCATION/TRAINING PROGRAM

## 2024-12-29 PROCEDURE — 94640 AIRWAY INHALATION TREATMENT: CPT

## 2024-12-29 PROCEDURE — 2580000003 HC RX 258: Performed by: STUDENT IN AN ORGANIZED HEALTH CARE EDUCATION/TRAINING PROGRAM

## 2024-12-29 PROCEDURE — 2700000000 HC OXYGEN THERAPY PER DAY

## 2024-12-29 PROCEDURE — 94669 MECHANICAL CHEST WALL OSCILL: CPT

## 2024-12-29 PROCEDURE — 92526 ORAL FUNCTION THERAPY: CPT

## 2024-12-29 PROCEDURE — 94761 N-INVAS EAR/PLS OXIMETRY MLT: CPT

## 2024-12-29 PROCEDURE — 6370000000 HC RX 637 (ALT 250 FOR IP): Performed by: PHYSICIAN ASSISTANT

## 2024-12-29 PROCEDURE — 2500000003 HC RX 250 WO HCPCS: Performed by: STUDENT IN AN ORGANIZED HEALTH CARE EDUCATION/TRAINING PROGRAM

## 2024-12-29 PROCEDURE — 2000000000 HC ICU R&B

## 2024-12-29 PROCEDURE — 80048 BASIC METABOLIC PNL TOTAL CA: CPT

## 2024-12-29 PROCEDURE — 99233 SBSQ HOSP IP/OBS HIGH 50: CPT | Performed by: STUDENT IN AN ORGANIZED HEALTH CARE EDUCATION/TRAINING PROGRAM

## 2024-12-29 RX ORDER — HEPARIN SODIUM 10000 [USP'U]/100ML
5-30 INJECTION, SOLUTION INTRAVENOUS CONTINUOUS
Status: DISCONTINUED | OUTPATIENT
Start: 2024-12-29 | End: 2024-12-31 | Stop reason: HOSPADM

## 2024-12-29 RX ORDER — HEPARIN SODIUM 1000 [USP'U]/ML
60 INJECTION, SOLUTION INTRAVENOUS; SUBCUTANEOUS ONCE
Status: DISCONTINUED | OUTPATIENT
Start: 2024-12-29 | End: 2024-12-29

## 2024-12-29 RX ORDER — HEPARIN SODIUM 10000 [USP'U]/100ML
5-30 INJECTION, SOLUTION INTRAVENOUS CONTINUOUS
Status: DISCONTINUED | OUTPATIENT
Start: 2024-12-29 | End: 2024-12-29

## 2024-12-29 RX ORDER — HEPARIN SODIUM 1000 [USP'U]/ML
60 INJECTION, SOLUTION INTRAVENOUS; SUBCUTANEOUS PRN
Status: DISCONTINUED | OUTPATIENT
Start: 2024-12-29 | End: 2024-12-29

## 2024-12-29 RX ORDER — POTASSIUM CHLORIDE 1500 MG/1
20 TABLET, EXTENDED RELEASE ORAL ONCE
Status: COMPLETED | OUTPATIENT
Start: 2024-12-29 | End: 2024-12-29

## 2024-12-29 RX ORDER — POTASSIUM CHLORIDE 29.8 MG/ML
20 INJECTION INTRAVENOUS PRN
Status: DISCONTINUED | OUTPATIENT
Start: 2024-12-29 | End: 2024-12-31 | Stop reason: HOSPADM

## 2024-12-29 RX ORDER — HEPARIN SODIUM 1000 [USP'U]/ML
30 INJECTION, SOLUTION INTRAVENOUS; SUBCUTANEOUS PRN
Status: DISCONTINUED | OUTPATIENT
Start: 2024-12-29 | End: 2024-12-29

## 2024-12-29 RX ORDER — HEPARIN SODIUM 1000 [USP'U]/ML
30 INJECTION, SOLUTION INTRAVENOUS; SUBCUTANEOUS PRN
Status: DISCONTINUED | OUTPATIENT
Start: 2024-12-29 | End: 2024-12-31 | Stop reason: HOSPADM

## 2024-12-29 RX ORDER — HEPARIN SODIUM 1000 [USP'U]/ML
60 INJECTION, SOLUTION INTRAVENOUS; SUBCUTANEOUS PRN
Status: DISCONTINUED | OUTPATIENT
Start: 2024-12-29 | End: 2024-12-31 | Stop reason: HOSPADM

## 2024-12-29 RX ORDER — POTASSIUM CHLORIDE 7.45 MG/ML
10 INJECTION INTRAVENOUS PRN
Status: DISCONTINUED | OUTPATIENT
Start: 2024-12-29 | End: 2024-12-31 | Stop reason: HOSPADM

## 2024-12-29 RX ADMIN — SODIUM CHLORIDE 3000 MG: 900 INJECTION INTRAVENOUS at 11:19

## 2024-12-29 RX ADMIN — HEPARIN SODIUM 12 UNITS/KG/HR: 10000 INJECTION, SOLUTION INTRAVENOUS at 21:05

## 2024-12-29 RX ADMIN — SODIUM CHLORIDE, PRESERVATIVE FREE 10 ML: 5 INJECTION INTRAVENOUS at 20:54

## 2024-12-29 RX ADMIN — IPRATROPIUM BROMIDE AND ALBUTEROL SULFATE 1 DOSE: .5; 3 SOLUTION RESPIRATORY (INHALATION) at 12:31

## 2024-12-29 RX ADMIN — SODIUM CHLORIDE 3000 MG: 900 INJECTION INTRAVENOUS at 22:51

## 2024-12-29 RX ADMIN — OSELTAMIVIR PHOSPHATE 30 MG: 30 CAPSULE ORAL at 09:37

## 2024-12-29 RX ADMIN — SODIUM CHLORIDE 3000 MG: 900 INJECTION INTRAVENOUS at 16:37

## 2024-12-29 RX ADMIN — DEXTROSE MONOHYDRATE: 50 INJECTION, SOLUTION INTRAVENOUS at 16:38

## 2024-12-29 RX ADMIN — IPRATROPIUM BROMIDE AND ALBUTEROL SULFATE 1 DOSE: .5; 3 SOLUTION RESPIRATORY (INHALATION) at 08:49

## 2024-12-29 RX ADMIN — IPRATROPIUM BROMIDE AND ALBUTEROL SULFATE 1 DOSE: .5; 3 SOLUTION RESPIRATORY (INHALATION) at 19:57

## 2024-12-29 RX ADMIN — IPRATROPIUM BROMIDE AND ALBUTEROL SULFATE 1 DOSE: .5; 3 SOLUTION RESPIRATORY (INHALATION) at 16:57

## 2024-12-29 RX ADMIN — ARFORMOTEROL TARTRATE 15 MCG: 15 SOLUTION RESPIRATORY (INHALATION) at 19:57

## 2024-12-29 RX ADMIN — APIXABAN 5 MG: 5 TABLET, FILM COATED ORAL at 09:38

## 2024-12-29 RX ADMIN — AMIODARONE HYDROCHLORIDE 200 MG: 200 TABLET ORAL at 09:37

## 2024-12-29 RX ADMIN — DEXTROSE MONOHYDRATE: 50 INJECTION, SOLUTION INTRAVENOUS at 00:32

## 2024-12-29 RX ADMIN — PANTOPRAZOLE SODIUM 40 MG: 40 TABLET, DELAYED RELEASE ORAL at 05:43

## 2024-12-29 RX ADMIN — POTASSIUM CHLORIDE 20 MEQ: 1500 TABLET, EXTENDED RELEASE ORAL at 13:41

## 2024-12-29 RX ADMIN — OSELTAMIVIR PHOSPHATE 30 MG: 30 CAPSULE ORAL at 20:54

## 2024-12-29 RX ADMIN — BUDESONIDE 500 MCG: 0.5 SUSPENSION RESPIRATORY (INHALATION) at 08:49

## 2024-12-29 RX ADMIN — WATER 40 MG: 1 INJECTION INTRAMUSCULAR; INTRAVENOUS; SUBCUTANEOUS at 02:27

## 2024-12-29 RX ADMIN — SODIUM CHLORIDE 3000 MG: 900 INJECTION INTRAVENOUS at 05:43

## 2024-12-29 RX ADMIN — BUDESONIDE 500 MCG: 0.5 SUSPENSION RESPIRATORY (INHALATION) at 19:57

## 2024-12-29 ASSESSMENT — PAIN SCALES - GENERAL: PAINLEVEL_OUTOF10: 0

## 2024-12-29 NOTE — PROGRESS NOTES
Hospitalist Progress Note    Name:  Vane Martini    /Age/Sex: 1945  (79 y.o. female)  MRN & CSN:  [id number] & [id number]    PCP: No primary care provider on file.    Date of Admission: 2024    Patient Status:  Inpatient     Chief Complaint:   Chief Complaint   Patient presents with    Fall     [place] ems from home due to unwitnessed fall at home, per EMS, pt was on the ground fo 2 hours. Per pt, she tripped and fell. Denies LOC. Also, per EMS, pt was SOB upon arrival with O2 in the 60s, pt was given duoneb en route. Pt alert and oriented during triage        Hospital Course:   Patient started feeling poorly with increased SOB on . She fell and was too weak to get up. Her sister found her. She has no children and lives alone.      She was on 3.5L when I saw her this am. Sats were 86%. Went into rapid afib at 8:30. Had neb tx shortly before that. She does not feel any worse today than yesterday. Denies pain. Has occasional cough.       She is noted to be flu  A positive   Patient was transferred to the ICU for worsening respiratory failure   She was placed on BiPAP    Subjective:  Today is:  Hospital Day: 6.  Patient seen and examined in CVU-2903/2903-.     She is doing better today  Seen off of BiPAP       Medications:  Reviewed    Infusion Medications    heparin (PORCINE) Infusion      dextrose 50 mL/hr at 24 1638    sodium chloride       Scheduled Medications    [START ON 2024] methylPREDNISolone  40 mg IntraVENous Daily    ampicillin-sulbactam  3,000 mg IntraVENous Q6H    budesonide  0.5 mg Nebulization BID RT    arformoterol tartrate  15 mcg Nebulization BID RT    oseltamivir  30 mg Oral BID    sodium chloride flush  5-40 mL IntraVENous 2 times per day    pantoprazole  40 mg Oral QAM AC    ipratropium 0.5 mg-albuterol 2.5 mg  1 Dose Inhalation Q4H WA RT    amiodarone  200 mg Oral Daily    [Held by provider] empagliflozin  10 mg Oral Daily    [Held by provider]    CREATININE 0.7 0.7 0.7   CALCIUM 8.6 8.4 8.3     Recent Labs     12/27/24  1322   AST 47*   ALT 74*   BILITOT 0.4   ALKPHOS 93     Recent Labs     12/29/24  1248   INR 2.24*     Recent Labs     12/27/24  1322   CKTOTAL 186       Urinalysis:      Lab Results   Component Value Date/Time    NITRU Negative 12/24/2024 06:14 PM    WBCUA 1 12/24/2024 06:14 PM    BACTERIA 2+ 12/24/2024 06:14 PM    RBCUA 5 12/24/2024 06:14 PM    BLOODU MODERATE 12/24/2024 06:14 PM    GLUCOSEU 100 12/24/2024 06:14 PM       Radiology:  XR CHEST PORTABLE   Final Result   Bilateral pleuroparenchymal disease, increased on the left compared to   prior.There is underlying emphysema         XR CHEST PORTABLE   Final Result   New right greater than left multifocal fluffy airspace opacities. Findings   are concerning for multifocal pneumonia.         CT ABDOMEN PELVIS WO CONTRAST Additional Contrast? None   Final Result   1. No acute intra-abdominal or pelvic process.   2. Infrarenal abdominal aortic aneurysm measures 4.7 cm in diameter.   Recommend follow-up every 6 months and vascular consultation.   3. Ill-defined airspace opacities in the right and left lung bases may   represent bibasilar pneumonia.   4. Multilevel compression fracture in the lumbar spine most likely old   fractures.   5. Calcified lesion on the right side of the uterus measures 4 cm most likely   represent a pedunculated fibroid.      RECOMMENDATIONS:   Pathology: Abdominal aortic aneurysm measuring 4.7 cm.Recommend CTA or MRA,   as appropriate, in 12 months and referral to vascular specialist.Reference:   Journal of Vascular Surgery 67.1 (2018): 2-77. J Am Kevin Radiol   2013;10:789-794.Tr         CT CHEST WO CONTRAST   Final Result   1. Negative for acute traumatic injury.   2. Extensive emphysema.   3. Questionable abdominal aortic aneurysm.         CT HEAD WO CONTRAST   Final Result   No acute intracranial abnormality.      Mild parenchymal volume loss.      Moderate

## 2024-12-29 NOTE — PLAN OF CARE
Problem: Safety - Adult  Goal: Free from fall injury  12/28/2024 2052 by Lindsey Landis, RN  Outcome: Progressing   Patient placed on fall Precautions per [place] Fall Risk Assessment Scale. Fall risk armband on, yellow blanket placed on foot of bed, S.A.F.E. sign displayed at door. Bed in locked and in lowest position, bed alarm armed and audible, call light and bedside table in reach. Patient acknowledges the need to call before getting out of bed.     Problem: Respiratory - Adult  Goal: Achieves optimal ventilation and oxygenation  12/28/2024 2052 by Lindsey Landis, RN  Outcome: Progressing   >92% on O2 via HF-NC @8. BiPAP on overnight. No s/s of respiratory distress noted. Deep breathing and IS encouraged.    Problem: Cardiovascular - Adult  Goal: Maintains optimal cardiac output and hemodynamic stability  Outcome: Progressing   Heart rate and rhythm, peripheral pulses, and cap refill assessed with assessment. General color and body temperature monitored throughout shift and with vitals. Assess for edema with head to toe assessment. Administer treatments and medications as ordered. Monitor patient's weight.    Problem: Skin/Tissue Integrity  Goal: Absence of new skin breakdown  Description: 1.  Monitor for areas of redness and/or skin breakdown  2.  Assess vascular access sites hourly  3.  Every 4-6 hours minimum:  Change oxygen saturation probe site  4.  Every 4-6 hours:  If on nasal continuous positive airway pressure, respiratory therapy assess nares and determine need for appliance change or resting period.  Outcome: Progressing   At risk for skin breakdown. See Raul scale. Turn and reposition every two hours and as needed. Heels elevated off bed. Protective barrier placed as needed. Patient kept clean and dry. Pillows used for positioning. Will continue to monitor for skin breakdown

## 2024-12-29 NOTE — PROGRESS NOTES
Pulmonary and Critical Care Medicine    CC: f/u respiratory failure    Date: 2024  Admit Date:  2024  Consult Requesting Physician: Barney Connolly MD     HPI     This is a 78 y/o F w/ a hx of COPD, Afib, CHF, who presented after a fall, admitted with acute hypoxic respiratory failure, Influenza A infection, rhabdomyolysis, transaminitis.    Interm History:   She is on 8L NC but saturation is 97%  She had some blood mixed sputum noted in tissue   Sitting on chair today     ROS: negative except as stated above    OBJECTIVE DATA       PHYSICAL EXAM:   Temp  Av.7 °F (37.1 °C)  Min: 98.6 °F (37 °C)  Max: 98.8 °F (37.1 °C)  Pulse  Av.6  Min: 61  Max: 93  BP  Min: 105/66  Max: 145/85  SpO2  Av.2 %  Min: 91 %  Max: 96 %  FiO2   Av %  Min: 30 %  Max: 30 %    General: NAD  Neuro: awake and alert   Lung: course, equal non labored  Heart: regular rate    MEDICATIONS: Reviewed  Scheduled Meds:   heparin (porcine)  60 Units/kg IntraVENous Once    ampicillin-sulbactam  3,000 mg IntraVENous Q6H    budesonide  0.5 mg Nebulization BID RT    arformoterol tartrate  15 mcg Nebulization BID RT    oseltamivir  30 mg Oral BID    sodium chloride flush  5-40 mL IntraVENous 2 times per day    methylPREDNISolone  40 mg IntraVENous Q12H    pantoprazole  40 mg Oral QAM AC    ipratropium 0.5 mg-albuterol 2.5 mg  1 Dose Inhalation Q4H WA RT    amiodarone  200 mg Oral Daily    [Held by provider] empagliflozin  10 mg Oral Daily    [Held by provider] metoprolol tartrate  50 mg Oral BID    [Held by provider] spironolactone  25 mg Oral Daily    [Held by provider] torsemide  20 mg Oral BID     Continuous Infusions:   heparin (PORCINE) Infusion      dextrose 75 mL/hr at 24 0600    sodium chloride       PRN Meds:.heparin (porcine), heparin (porcine), sodium chloride flush, sodium chloride, ipratropium 0.5 mg-albuterol 2.5 mg     LABS: Reviewed.   Recent Labs     24  1322 24  0430 24  0315   NA

## 2024-12-29 NOTE — PROGRESS NOTES
Patient could not stand up because \"she had no strength\".  Niece and sister were visiting her for Roscoe roman and found out that she was on the floor hence called EMS.  Patrick reports that patient was probably down for approximately 2 to 3 hours.  Upon arrival of EMS patient was noted to be hypoxic with O2 saturation in 60s. During my evaluation, patient was alert oriented x 3 hemodynamically stable in mild respiratory distress on 2 L nasal cannula oxygen.  Patient states that she has been feeling sick since 12/4 with vague nasal congestion rhinorrhea cough.  Endorses increased cough, increased quantity of sputum production but no significant change in color or consistency.  Patient denies any chest pain LOC dizziness nausea vomiting diarrhea.  Endorses compliance with home medications.      Imaging:  Chest X-ray: 12/26/2024  IMPRESSION:  New right greater than left multifocal fluffy airspace opacities. Findings  are concerning for multifocal pneumonia.    Repeat Chest XR today 12/29/2024  IMPRESSION:  Bilateral pleuroparenchymal disease, increased on the left compared to  prior.There is underlying emphysema      History/Prior Level of Function:   Living Status: lives alone  Prior Dysphagia History: no history identified in chart     Reason for referral: SLP evaluation orders received due to \"SLP evaluation orders received due to concern for aspiration  and dysphagia risk\" .      Evaluation/Treatment   Dysphagia treatment impressions:   Dysphagia Impressions/Dysphagia Diagnosis: Oropharyngeal Dysphagia characterized by generalized weakness and reduced coordination impacting bolus control, rate of swallow trigger and potential reduced laryngeal rom /pharyngeal clearance. Pt fatigues easily with increase SOB which can exacerbate risks and impact oropharyngeal swallow and airway protection.  Pt upright in chair upon SLP entry  Pt alert, cooperative and oriented to self, place,month/year  Pt recently weaned to 6L from  pathology plan of care:  Pt verbalized understanding and agreement   Pt requires ongoing learning   Discussed with pt and with pt's RN  If patient discharges prior to next visit, this note will serve as discharge.     Treatment time:  Timed Code Treatment Minutes: 0  Total Treatment Time Minutes:20    Electronically signed by:    Petra Panchal MS,CCC,SLP [id number]  Speech and Language Pathologist  12/29/2024 at 1239 pm

## 2024-12-30 LAB
ANION GAP SERPL CALCULATED.3IONS-SCNC: 7 MMOL/L (ref 3–16)
APTT BLD: 45.7 SEC (ref 22.1–36.4)
APTT BLD: 97.1 SEC (ref 22.1–36.4)
APTT BLD: >180 SEC (ref 22.1–36.4)
APTT BLD: >180 SEC (ref 22.1–36.4)
BASE EXCESS BLDA CALC-SCNC: -8 MMOL/L (ref -3–3)
BASE EXCESS BLDV CALC-SCNC: -7.1 MMOL/L (ref -3–3)
BUN SERPL-MCNC: 10 MG/DL (ref 7–20)
CALCIUM SERPL-MCNC: 8.1 MG/DL (ref 8.3–10.6)
CHLORIDE SERPL-SCNC: 115 MMOL/L (ref 99–110)
CO2 BLDA-SCNC: 17 MMOL/L
CO2 BLDV-SCNC: 45 MMOL/L
CO2 SERPL-SCNC: 26 MMOL/L (ref 21–32)
COHGB MFR BLDV: 5 % (ref 0–1.5)
CREAT SERPL-MCNC: 0.6 MG/DL (ref 0.6–1.2)
DEPRECATED RDW RBC AUTO: 14 % (ref 12.4–15.4)
DO-HGB MFR BLDV: 26 %
GFR SERPLBLD CREATININE-BSD FMLA CKD-EPI: >90 ML/MIN/{1.73_M2}
GLUCOSE BLD-MCNC: 331 MG/DL (ref 70–99)
GLUCOSE SERPL-MCNC: 140 MG/DL (ref 70–99)
HCO3 BLDA-SCNC: 16.5 MMOL/L (ref 21–29)
HCO3 BLDV-SCNC: 18.9 MMOL/L (ref 23–29)
HCT VFR BLD AUTO: 35.3 % (ref 36–48)
HGB BLD-MCNC: 12.1 G/DL (ref 12–16)
MCH RBC QN AUTO: 32.6 PG (ref 26–34)
MCHC RBC AUTO-ENTMCNC: 34.3 G/DL (ref 31–36)
MCV RBC AUTO: 95.2 FL (ref 80–100)
METHGB MFR BLDV: 0.6 %
O2 CT VFR BLDV CALC: 9 VOL %
O2 THERAPY: ABNORMAL
PCO2 BLDA: 26.6 MM HG (ref 35–45)
PCO2 BLDV: 39.3 MMHG (ref 40–50)
PERFORMED ON: ABNORMAL
PH BLDA: 7.4 [PH] (ref 7.35–7.45)
PH BLDV: 7.29 [PH] (ref 7.35–7.45)
PLATELET # BLD AUTO: 195 K/UL (ref 135–450)
PMV BLD AUTO: 8.4 FL (ref 5–10.5)
PO2 BLDA: 78.9 MM HG (ref 75–108)
PO2 BLDV: 41.7 MMHG (ref 25–40)
POC SAMPLE TYPE: ABNORMAL
POTASSIUM BLD-SCNC: 4.4 MMOL/L (ref 3.5–5.1)
POTASSIUM SERPL-SCNC: 3.2 MMOL/L (ref 3.5–5.1)
RBC # BLD AUTO: 3.71 M/UL (ref 4–5.2)
SAO2 % BLDA: 96 % (ref 93–100)
SAO2 % BLDV: 73 %
SODIUM BLD-SCNC: 143 MMOL/L (ref 136–145)
SODIUM SERPL-SCNC: 148 MMOL/L (ref 136–145)
WBC # BLD AUTO: 13.5 K/UL (ref 4–11)

## 2024-12-30 PROCEDURE — 85730 THROMBOPLASTIN TIME PARTIAL: CPT

## 2024-12-30 PROCEDURE — 97530 THERAPEUTIC ACTIVITIES: CPT

## 2024-12-30 PROCEDURE — 2700000000 HC OXYGEN THERAPY PER DAY

## 2024-12-30 PROCEDURE — 6360000002 HC RX W HCPCS: Performed by: INTERNAL MEDICINE

## 2024-12-30 PROCEDURE — 94761 N-INVAS EAR/PLS OXIMETRY MLT: CPT

## 2024-12-30 PROCEDURE — 85027 COMPLETE CBC AUTOMATED: CPT

## 2024-12-30 PROCEDURE — 99291 CRITICAL CARE FIRST HOUR: CPT | Performed by: INTERNAL MEDICINE

## 2024-12-30 PROCEDURE — 6370000000 HC RX 637 (ALT 250 FOR IP): Performed by: FAMILY MEDICINE

## 2024-12-30 PROCEDURE — 6360000002 HC RX W HCPCS: Performed by: PHYSICIAN ASSISTANT

## 2024-12-30 PROCEDURE — 80048 BASIC METABOLIC PNL TOTAL CA: CPT

## 2024-12-30 PROCEDURE — 6370000000 HC RX 637 (ALT 250 FOR IP): Performed by: STUDENT IN AN ORGANIZED HEALTH CARE EDUCATION/TRAINING PROGRAM

## 2024-12-30 PROCEDURE — 84295 ASSAY OF SERUM SODIUM: CPT

## 2024-12-30 PROCEDURE — 2500000003 HC RX 250 WO HCPCS: Performed by: PHYSICIAN ASSISTANT

## 2024-12-30 PROCEDURE — 92526 ORAL FUNCTION THERAPY: CPT

## 2024-12-30 PROCEDURE — 2580000003 HC RX 258: Performed by: STUDENT IN AN ORGANIZED HEALTH CARE EDUCATION/TRAINING PROGRAM

## 2024-12-30 PROCEDURE — 82947 ASSAY GLUCOSE BLOOD QUANT: CPT

## 2024-12-30 PROCEDURE — 82803 BLOOD GASES ANY COMBINATION: CPT

## 2024-12-30 PROCEDURE — 6370000000 HC RX 637 (ALT 250 FOR IP): Performed by: PHYSICIAN ASSISTANT

## 2024-12-30 PROCEDURE — 2000000000 HC ICU R&B

## 2024-12-30 PROCEDURE — 97535 SELF CARE MNGMENT TRAINING: CPT

## 2024-12-30 PROCEDURE — 84132 ASSAY OF SERUM POTASSIUM: CPT

## 2024-12-30 PROCEDURE — 94660 CPAP INITIATION&MGMT: CPT

## 2024-12-30 PROCEDURE — 2500000003 HC RX 250 WO HCPCS: Performed by: STUDENT IN AN ORGANIZED HEALTH CARE EDUCATION/TRAINING PROGRAM

## 2024-12-30 PROCEDURE — 94640 AIRWAY INHALATION TREATMENT: CPT

## 2024-12-30 PROCEDURE — 6360000002 HC RX W HCPCS: Performed by: STUDENT IN AN ORGANIZED HEALTH CARE EDUCATION/TRAINING PROGRAM

## 2024-12-30 PROCEDURE — 97110 THERAPEUTIC EXERCISES: CPT

## 2024-12-30 PROCEDURE — 6370000000 HC RX 637 (ALT 250 FOR IP): Performed by: INTERNAL MEDICINE

## 2024-12-30 PROCEDURE — 6360000002 HC RX W HCPCS: Performed by: FAMILY MEDICINE

## 2024-12-30 PROCEDURE — 2500000003 HC RX 250 WO HCPCS: Performed by: FAMILY MEDICINE

## 2024-12-30 PROCEDURE — 2500000003 HC RX 250 WO HCPCS: Performed by: INTERNAL MEDICINE

## 2024-12-30 RX ORDER — DEXTROSE MONOHYDRATE 100 MG/ML
INJECTION, SOLUTION INTRAVENOUS CONTINUOUS PRN
Status: DISCONTINUED | OUTPATIENT
Start: 2024-12-30 | End: 2024-12-31 | Stop reason: HOSPADM

## 2024-12-30 RX ORDER — FUROSEMIDE 10 MG/ML
20 INJECTION INTRAMUSCULAR; INTRAVENOUS ONCE
Status: COMPLETED | OUTPATIENT
Start: 2024-12-30 | End: 2024-12-30

## 2024-12-30 RX ORDER — NOREPINEPHRINE BITARTRATE 0.06 MG/ML
1-100 INJECTION, SOLUTION INTRAVENOUS CONTINUOUS
Status: DISCONTINUED | OUTPATIENT
Start: 2024-12-30 | End: 2024-12-31 | Stop reason: HOSPADM

## 2024-12-30 RX ORDER — INSULIN LISPRO 100 [IU]/ML
0-8 INJECTION, SOLUTION INTRAVENOUS; SUBCUTANEOUS
Status: DISCONTINUED | OUTPATIENT
Start: 2024-12-30 | End: 2024-12-31 | Stop reason: HOSPADM

## 2024-12-30 RX ORDER — ACETAMINOPHEN 325 MG/1
650 TABLET ORAL EVERY 4 HOURS PRN
Status: DISCONTINUED | OUTPATIENT
Start: 2024-12-30 | End: 2024-12-31 | Stop reason: HOSPADM

## 2024-12-30 RX ORDER — GLUCAGON 1 MG/ML
1 KIT INJECTION PRN
Status: DISCONTINUED | OUTPATIENT
Start: 2024-12-30 | End: 2024-12-31 | Stop reason: HOSPADM

## 2024-12-30 RX ORDER — DEXMEDETOMIDINE HYDROCHLORIDE 4 UG/ML
.1-1.5 INJECTION, SOLUTION INTRAVENOUS CONTINUOUS
Status: DISCONTINUED | OUTPATIENT
Start: 2024-12-30 | End: 2024-12-31 | Stop reason: HOSPADM

## 2024-12-30 RX ADMIN — IPRATROPIUM BROMIDE AND ALBUTEROL SULFATE 1 DOSE: .5; 3 SOLUTION RESPIRATORY (INHALATION) at 08:21

## 2024-12-30 RX ADMIN — EPINEPHRINE 1 MG: 0.1 INJECTION, SOLUTION ENDOTRACHEAL; INTRACARDIAC; INTRAVENOUS at 23:31

## 2024-12-30 RX ADMIN — SODIUM CHLORIDE 3000 MG: 900 INJECTION INTRAVENOUS at 12:07

## 2024-12-30 RX ADMIN — IPRATROPIUM BROMIDE AND ALBUTEROL SULFATE 1 DOSE: .5; 3 SOLUTION RESPIRATORY (INHALATION) at 12:31

## 2024-12-30 RX ADMIN — HEPARIN SODIUM 16 UNITS/KG/HR: 10000 INJECTION, SOLUTION INTRAVENOUS at 04:06

## 2024-12-30 RX ADMIN — EPINEPHRINE 1 MG: 0.1 INJECTION, SOLUTION ENDOTRACHEAL; INTRACARDIAC; INTRAVENOUS at 23:39

## 2024-12-30 RX ADMIN — DEXMEDETOMIDINE HYDROCHLORIDE 0.2 MCG/KG/HR: 4 INJECTION, SOLUTION INTRAVENOUS at 21:52

## 2024-12-30 RX ADMIN — SODIUM BICARBONATE 50 MEQ: 84 INJECTION INTRAVENOUS at 23:43

## 2024-12-30 RX ADMIN — AMIODARONE HYDROCHLORIDE 200 MG: 200 TABLET ORAL at 08:52

## 2024-12-30 RX ADMIN — FUROSEMIDE 20 MG: 10 INJECTION, SOLUTION INTRAMUSCULAR; INTRAVENOUS at 10:56

## 2024-12-30 RX ADMIN — SODIUM CHLORIDE 3000 MG: 900 INJECTION INTRAVENOUS at 05:01

## 2024-12-30 RX ADMIN — Medication 5 MCG/MIN: at 23:20

## 2024-12-30 RX ADMIN — Medication 1 SPRAY: at 16:11

## 2024-12-30 RX ADMIN — CALCIUM CHLORIDE 1000 MG: 100 INJECTION INTRAVENOUS; INTRAVENTRICULAR at 23:42

## 2024-12-30 RX ADMIN — ARFORMOTEROL TARTRATE 15 MCG: 15 SOLUTION RESPIRATORY (INHALATION) at 20:21

## 2024-12-30 RX ADMIN — SODIUM BICARBONATE 50 MEQ: 84 INJECTION INTRAVENOUS at 23:30

## 2024-12-30 RX ADMIN — DEXTROSE MONOHYDRATE: 50 INJECTION, SOLUTION INTRAVENOUS at 14:19

## 2024-12-30 RX ADMIN — SODIUM CHLORIDE 3000 MG: 900 INJECTION INTRAVENOUS at 16:12

## 2024-12-30 RX ADMIN — OSELTAMIVIR PHOSPHATE 30 MG: 30 CAPSULE ORAL at 08:52

## 2024-12-30 RX ADMIN — EPINEPHRINE 1 MG: 0.1 INJECTION, SOLUTION ENDOTRACHEAL; INTRACARDIAC; INTRAVENOUS at 23:37

## 2024-12-30 RX ADMIN — ARFORMOTEROL TARTRATE 15 MCG: 15 SOLUTION RESPIRATORY (INHALATION) at 08:21

## 2024-12-30 RX ADMIN — WATER 40 MG: 1 INJECTION INTRAMUSCULAR; INTRAVENOUS; SUBCUTANEOUS at 08:53

## 2024-12-30 RX ADMIN — POTASSIUM CHLORIDE 20 MEQ: 29.8 INJECTION, SOLUTION INTRAVENOUS at 10:24

## 2024-12-30 RX ADMIN — BUDESONIDE 500 MCG: 0.5 SUSPENSION RESPIRATORY (INHALATION) at 20:21

## 2024-12-30 RX ADMIN — INSULIN LISPRO 6 UNITS: 100 INJECTION, SOLUTION INTRAVENOUS; SUBCUTANEOUS at 23:13

## 2024-12-30 RX ADMIN — HEPARIN SODIUM 3400 UNITS: 1000 INJECTION INTRAVENOUS; SUBCUTANEOUS at 04:01

## 2024-12-30 RX ADMIN — PANTOPRAZOLE SODIUM 40 MG: 40 TABLET, DELAYED RELEASE ORAL at 08:52

## 2024-12-30 RX ADMIN — IPRATROPIUM BROMIDE AND ALBUTEROL SULFATE 1 DOSE: .5; 3 SOLUTION RESPIRATORY (INHALATION) at 20:21

## 2024-12-30 RX ADMIN — BUDESONIDE 500 MCG: 0.5 SUSPENSION RESPIRATORY (INHALATION) at 08:21

## 2024-12-30 RX ADMIN — POTASSIUM CHLORIDE 20 MEQ: 29.8 INJECTION, SOLUTION INTRAVENOUS at 13:24

## 2024-12-30 RX ADMIN — SODIUM BICARBONATE 50 MEQ: 84 INJECTION INTRAVENOUS at 23:29

## 2024-12-30 RX ADMIN — IPRATROPIUM BROMIDE AND ALBUTEROL SULFATE 1 DOSE: .5; 3 SOLUTION RESPIRATORY (INHALATION) at 16:41

## 2024-12-30 RX ADMIN — EPINEPHRINE 1 MG: 0.1 INJECTION, SOLUTION ENDOTRACHEAL; INTRACARDIAC; INTRAVENOUS at 23:42

## 2024-12-30 RX ADMIN — SODIUM CHLORIDE, PRESERVATIVE FREE 10 ML: 5 INJECTION INTRAVENOUS at 21:52

## 2024-12-30 RX ADMIN — EPINEPHRINE 1 MG: 0.1 INJECTION, SOLUTION ENDOTRACHEAL; INTRACARDIAC; INTRAVENOUS at 23:34

## 2024-12-30 RX ADMIN — EPINEPHRINE 1 MG: 0.1 INJECTION, SOLUTION ENDOTRACHEAL; INTRACARDIAC; INTRAVENOUS at 23:46

## 2024-12-30 RX ADMIN — SODIUM CHLORIDE 3000 MG: 900 INJECTION INTRAVENOUS at 23:13

## 2024-12-30 ASSESSMENT — PAIN SCALES - GENERAL: PAINLEVEL_OUTOF10: 0

## 2024-12-30 NOTE — FLOWSHEET NOTE
12/30/24 1300   Oxygen Therapy   Pulse Oximeter Device Mode Continuous   Pulse Oximeter Device Location Finger   O2 Device High flow nasal cannula   O2 Flow Rate (L/min) 5 L/min     Patient stating she needs break from bipap - placed back onto HF-care ongoing

## 2024-12-30 NOTE — PROGRESS NOTES
12/29/24 2305   NIV Type   Ventilator ID 7   NIV Started/Stopped On   Equipment Type V60   Mode Bilevel   Mask Type Full face mask   Mask Size Medium   Assessment   Pulse 93   Respirations 22   SpO2 96 %   Settings/Measurements   PIP Observed 13 cm H20   IPAP 12 cmH20   CPAP/EPAP 8 cmH2O   Vt (Measured) 509 mL   Rate Ordered 8   FiO2  30 %   I Time/ I Time % 1 s   Minute Volume (L/min) 16.8 Liters   Mask Leak (lpm) 1 lpm   Patient's Home Machine No   Alarm Settings   Alarms On Y   Low Pressure (cmH2O) 3 cmH2O   High Pressure (cmH2O) 30 cmH2O   RR Low (bpm) 9   RR High (bpm) 40 br/min   Oxygen Therapy/Pulse Ox   O2 Therapy Oxygen   O2 Device PAP (positive airway pressure)

## 2024-12-30 NOTE — PROGRESS NOTES
Patient WOB increased- tachypnea RR 30's-35's. Patient continues to sit in tripod position- Patient placed on PAP- o2 saturation 88%- pt tolerating at this time- care ongoing

## 2024-12-30 NOTE — PROGRESS NOTES
[facility] - Inpatient Rehabilitation Department   Phone: [phone]    Occupational Therapy    [] Initial Evaluation            [x] Daily Treatment Note         [] Discharge Summary      Patient: Vane Martini   : 1945   MRN: [id number]   Date of Service:  2024    Admitting Diagnosis:  Generalized weakness  Current Admission Summary: Per EMR:  Patient started feeling poorly with increased SOB on . She fell and was too weak to get up. Her sister found her. She has no children and lives alone.   Past Medical History:  has a past medical history of Abnormal weight  Past Medical History:  has a past medical history of Abnormal weight loss, Chronic combined systolic and diastolic heart failure (HCC), COPD with emphysema (HCC), PAF (paroxysmal atrial fibrillation) ([facility]), and Pericardial effusion.  Past Surgical History:  has no past surgical history on file.    Discharge Recommendations: Vane Martini scored a 16/24 on the AM-PAC ADL Inpatient form. Current research shows that an AM-PAC score of 17 or less is typically not associated with a discharge to the patient's home setting. Based on the patient's AM-PAC score and their current ADL deficits, it is recommended that the patient have 3-5 sessions per week of Occupational Therapy at d/c to increase the patient's independence.  Please see assessment section for further patient specific details.    If patient discharges prior to next session this note will serve as a discharge summary.  Please see below for the latest assessment towards goals.       DME Required For Discharge: DME to be determined at next level of care, DME to be determined pending patient progress    Precautions/Restrictions: high fall risk, Isolation precautions  Weight Bearing Restrictions: no restrictions  [] Right Upper Extremity  [] Left Upper Extremity [] Right Lower Extremity  [] Left Lower Extremity     Required Braces/Orthotics: no braces required   []  Right  [] Left  Positional Restrictions:no positional restrictions    Pre-Admission Information   Lives With: alone                     Type of Home: house  Home Layout: one level  Home Access:  1 step to enter with handrail.  Handrails are located on R side.  Bathroom Layout: tub only  Bathroom Equipment:  N/A  Toilet Height: standard height  Home Equipment: no prior equipment  Transfer Assistance: Independent without use of device  Ambulation Assistance:Independent without use of device  ADL Assistance: independent with all ADL's  IADL Assistance: independent with homemaking tasks  Active :        [] Yes                 [x] No  Hand Dominance: [] Left                 [x] Right  Current Employment: retired.  Occupation: worked on [employer]  Hobbies: watching TV  Recent Falls: 1, reported only fall is the one that brought her in      Available Assistance at Discharge: 24 hr supervision (non-physical) available    Examination   Vision:   Vision Corrective Device: wears glasses at all times, reports going blind  Hearing:   WFL  Observation:   General Observation:  Patient supine in bed upon arrival, HOB elevated, on 8L of O2, IV, external catheter, agreeable to PT/OT. RN present to provide medication.  Posture:   Increased thoracic kyphosis, forward head posture    Therapist Clinical Decision Making (Complexity): medium complexity  Clinical Presentation: evolving      Subjective  General: Pt seated EOB, tripod position, upon entry. She is SOB at rest and has difficulty speaking since vocal production increases effort. Pt pleasant and agreeable.   Pain: 5/10.  Location: L hip  Pain Interventions: RN notified of patient request for pain medication, repositioned , and therapy activities modified        Activities of Daily Living  Basic Activities of Daily Living  Lower Extremity Dressing: maximum assistance Increased time to complete task (pulling depends down/up)  Toileting: dependent.    Toileting Comments:

## 2024-12-30 NOTE — PROGRESS NOTES
ipratropium 0.5 mg-albuterol 2.5 mg    PHYSICAL EXAM:  BP 92/79   Pulse 92   Temp 97 °F (36.1 °C) (Temporal)   Resp 19   Ht 1.6 m (5' 3\")   Wt 57.8 kg (127 lb 6.8 oz)   SpO2 93%   BMI 22.57 kg/m²  I/O last 3 completed shifts:  In: 3719.2 [I.V.:2801.2; IV Piggyback:918]  Out: 1300 [Urine:1300] No intake/output data recorded.    Intake/Output Summary (Last 24 hours) at 12/30/2024 1145  Last data filed at 12/30/2024 0604  Gross per 24 hour   Intake 1866.53 ml   Output 400 ml   Net 1466.53 ml       CONSTITUTIONAL: She is a 79 y.o.-year-old who appears her stated age. She is alert and oriented x 3 and in no acute distress.   CARDIOVASCULAR: S1 S2 RRR. Without murmer  RESPIRATORY & CHEST: Bilateral crackles. No wheezing. Good air movement  GASTROINTESTINAL & ABDOMEN: Soft, nontender, positive bowel sounds in all quadrants, non-distended, without hepatosplenomegaly.   GENITOURINARY: Deferred.   MUSCULOSKELETAL: No tenderness to palpation of the axial skeleton. There is no clubbing. No cyanosis. No edema of the lower extremities.   SKIN OF BODY: No rash or jaundice.   PSYCHIATRIC EVALUATION: Normal affect. Patient answers questions appropriately.   HEMATOLOGIC/LYMPHATIC/ IMMUNOLOGIC: No palpable lymphadenopathy.  NEUROLOGIC: Alert and oriented x 3.Groslly non-focal. Motor strength is 5+/5 in all muscle groups. The patient has a normal sensorium globally.     LABS:    Recent Labs     12/27/24  1322 12/28/24  0430 12/29/24  0315 12/29/24  1248 12/30/24  0500   WBC 10.1   < > 11.3* 9.6 13.5*   RBC 4.13   < > 4.12 4.11 3.71*   HGB 13.3   < > 13.1 13.2 12.1   HCT 39.5   < > 39.3 39.6 35.3*   MCH 32.1   < > 31.8 32.1 32.6   MCHC 33.6   < > 33.3 33.2 34.3   RDW 14.0   < > 14.3 14.3 14.0      < > 187 177 195   MPV 8.5   < > 8.2 8.2 8.4   NEUTOPHILPCT 89.9  --   --   --   --    MONOPCT 6.6  --   --   --   --    BASOPCT 0.1  --   --   --   --     < > = values in this interval not displayed.     Recent Labs

## 2024-12-30 NOTE — CARE COORDINATION
Case Management Assessment  Initial Evaluation    Date/Time of Evaluation: 12/30/2024 2:25 PM   Assessment Completed by: SHANNON ALVAREZ RN    If patient is discharged prior to next notation, then this note serves as note for discharge by case management.    Patient Name: Vane Martini                   YOB: 1945  Diagnosis: Influenza [J11.1]  Generalized weakness [R53.1]  COPD exacerbation (HCC) [J44.1]  TJ (acute kidney injury) ([facility]) [N17.9]  Abnormal LFTs [R79.89]  Fall, initial encounter [W19.XXXA]  Traumatic rhabdomyolysis, initial encounter ([facility]) [T79.6XXA]                   Date / Time: 12/24/2024  4:51 PM    Patient Admission Status: Inpatient   Readmission Risk (Low < 19, Mod (19-27), High > 27): Readmission Risk Score: 12.8    Current PCP: No primary care provider on file.  PCP verified by CM? Yes    Chart Reviewed: Yes      History Provided by: Medical Record, Patient, Other (see comment) (called and spoke to odilia Nguyen)  Patient Orientation: Alert and Oriented    Patient Cognition: Alert    Hospitalization in the last 30 days (Readmission):  No    If yes, Readmission Assessment in CM Navigator will be completed.    Advance Directives:      Code Status: Full Code   Patient's Primary Decision Maker is: Legal Next of Kin    Primary Decision Maker: BronsonPatrick - Niece/Nephew - [phone]    Discharge Planning:    Patient lives with: Alone Type of Home: House, Other (Comment) (Ran home)  Primary Care Giver: Self  Patient Support Systems include: Family Members   Current Financial resources: Medicare  Current community resources: None  Current services prior to admission: Durable Medical Equipment, Transportation, Other (Comment) (family assists with transportation)            Current DME: Cane            Type of Home Care services:  None    ADLS  Prior functional level: Independent in ADLs/IADLs  Current functional level: Assistance with the following:, Other (see comment) (therapy  her family help with transportation. She states pt wears no oxygen at home.    Pt currently on 5 liters of oxygen.    Therapy notes pending.    CM made referral over epic to Home at [facility].     The Plan for Transition of Care is related to the following treatment goals of Influenza [J11.1]  Generalized weakness [R53.1]  COPD exacerbation (HCC) [J44.1]  TJ (acute kidney injury) ([facility]) [N17.9]  Abnormal LFTs [R79.89]  Fall, initial encounter [W19.XXXA]  Traumatic rhabdomyolysis, initial encounter ([facility]) [T79.6XXA]    IF APPLICABLE: The Patient and/or patient representative Vane and her family were provided with a choice of provider and agrees with the discharge plan. Freedom of choice list with basic dialogue that supports the patient's individualized plan of care/goals and shares the quality data associated with the providers was provided to: Patient, Patient Representative   Patient Representative Name: Ciaran Nguyen     The Patient and/or Patient Representative Agree with the Discharge Plan? Yes    Masha Rangel RN, BSN  [phone]

## 2024-12-30 NOTE — PROGRESS NOTES
12/30/24 0129   NIV Type   $NIV $Daily Charge   Ventilator ID 7   NIV Started/Stopped On   Equipment Type V60   Mode Bilevel   Mask Type Full face mask   Mask Size Medium   Assessment   Pulse 85   Respirations 28   SpO2 97 %   Settings/Measurements   PIP Observed 13 cm H20   IPAP 12 cmH20   CPAP/EPAP 8 cmH2O   Vt (Measured) 656 mL   Rate Ordered 8   FiO2  (S)  40 %   I Time/ I Time % 1 s   Minute Volume (L/min) 19.9 Liters   Mask Leak (lpm) 9 lpm   Patient's Home Machine No   Alarm Settings   Alarms On Y   Low Pressure (cmH2O) 3 cmH2O   High Pressure (cmH2O) 30 cmH2O   RR Low (bpm) 9   RR High (bpm) 40 br/min   Oxygen Therapy/Pulse Ox   O2 Therapy Oxygen   O2 Device PAP (positive airway pressure)

## 2024-12-30 NOTE — FLOWSHEET NOTE
Pt MAP less than 65- spoke with  - plan to start levophed if MAP stays <65- care ongoing    12/30/24 1700   Vitals   Pulse 96   Respirations (!) 31   BP (!) 75/56   MAP (Calculated) 62   MAP (mmHg) (!) 63   Oxygen Therapy   SpO2 94 %

## 2024-12-30 NOTE — PROGRESS NOTES
Speech Language Pathology  [facility] - Inpatient Rehabilitation Services  [phone]  SLP Dysphagia Treatment       Patient: Vane Martini   : 1945   MRN: [id number]      Evaluation Date: 2024      Admitting Dx: Influenza [J11.1]  Generalized weakness [R53.1]  COPD exacerbation ([facility]) [J44.1]  TJ (acute kidney injury) ([facility]) [N17.9]  Abnormal LFTs [R79.89]  Fall, initial encounter [W19.XXXA]  Traumatic rhabdomyolysis, initial encounter ([facility]) [T79.6XXA]  Treatment Diagnosis: Oropharyngeal Dysphagia   Pain: Denies                                  Recommendations      Recommended Diet and Intervention 2024:  Diet Solids Recommendation:  Npo except meds with puree Liquid Consistency Recommendation:  NPO except occasional ice chips or small sips H20 Recommended form of Meds: Meds crushed as able in puree    Pt may benefit from completion of instrumental swallow assessment prior to diet advance, discussed potential completion tomorrow  with pt's RN, pending pt's respiratory state.     Compensatory strategies: oral care; occasional ices chips; when giving necessary meds-upright for intake/close monitor/paced presentations; aspiration precautions;     Discharge Recommendations:  Discharge recommendations to be determined pending ongoing follow-up during acute care stay    History/Course of Treatment       H&P: Vane Martini is a 79 y.o. female with atrial flutter/persistent atrial fibrillation, chronic combined systolic and diastolic heart failure, severe COPD presented from home after sustaining a fall.  Patient tells me that today she was walking towards her front door and tripped over a coat rack, causing her to fall.  Patient could not stand up because \"she had no strength\".  Niece and sister were visiting her for Roscoe roman and found out that she was on the floor hence called EMS.  Patrick reports that patient was probably down for approximately 2 to 3 hours.  Upon arrival of

## 2024-12-30 NOTE — PROGRESS NOTES
[facility] - Inpatient Rehabilitation Department   Phone: [phone]    Physical Therapy    [] Initial Evaluation            [x] Daily Treatment Note         [] Discharge Summary      Patient: Vane Martini   : 1945   MRN: [id number]   Date of Service:  2024  Admitting Diagnosis: Generalized weakness  Current Admission Summary: Patient started feeling poorly with increased SOB on . She fell and was too weak to get up. Her sister found her. She has no children and lives alone. Found to have flu, a-fib with RVR, traumatic rhabdomyolysis   Past Medical History:  has a past medical history of Abnormal weight loss, Chronic combined systolic and diastolic heart failure (HCC), COPD with emphysema (HCC), PAF (paroxysmal atrial fibrillation) ([facility]), and Pericardial effusion.  Past Surgical History:  has no past surgical history on file.  Discharge Recommendations: Vane Martini scored a 16/24 on the AM-PAC short mobility form. Current research shows that an AM-PAC score of 17 or less is typically not associated with a discharge to the patient's home setting. Based on the patient's AM-PAC score and their current functional mobility deficits, it is recommended that the patient have 3-5 sessions per week of Physical Therapy at d/c to increase the patient's independence.  Please see assessment section for further patient specific details.    If patient discharges prior to next session this note will serve as a discharge summary.  Please see below for the latest assessment towards goals.    DME Required For Discharge: DME to be determined pending patient progress  Precautions/Restrictions: high fall risk, Isolation precautions  Weight Bearing Restrictions: no restrictions  [] Right Upper Extremity  [] Left Upper Extremity [] Right Lower Extremity  [] Left Lower Extremity     Required Braces/Orthotics: no braces required   [] Right  [] Left  Positional Restrictions:no positional

## 2024-12-31 VITALS
WEIGHT: 127.43 LBS | BODY MASS INDEX: 22.58 KG/M2 | OXYGEN SATURATION: 22 % | HEIGHT: 63 IN | SYSTOLIC BLOOD PRESSURE: 233 MMHG | DIASTOLIC BLOOD PRESSURE: 130 MMHG | TEMPERATURE: 96 F

## 2024-12-31 PROCEDURE — 31500 INSERT EMERGENCY AIRWAY: CPT

## 2024-12-31 PROCEDURE — 5A12012 PERFORMANCE OF CARDIAC OUTPUT, SINGLE, MANUAL: ICD-10-PCS | Performed by: FAMILY MEDICINE

## 2024-12-31 RX ORDER — EPINEPHRINE IN SOD CHLOR,ISO 1 MG/10 ML
SYRINGE (ML) INTRAVENOUS
Status: COMPLETED | OUTPATIENT
Start: 2024-12-30 | End: 2024-12-30

## 2024-12-31 RX ORDER — CALCIUM CHLORIDE 100 MG/ML
INJECTION INTRAVENOUS; INTRAVENTRICULAR
Status: COMPLETED | OUTPATIENT
Start: 2024-12-30 | End: 2024-12-30

## 2024-12-31 NOTE — CODE DOCUMENTATION
Pt had doppler pulse present at this time 2350. Hospitalit[facility] at bedside, Dr. Len Romero, states that a doppler pulse is not perfusing and not to continue. Pt was moving eyes, arm and leg at this time. 3 RNs heard heart tones at this time. Doppler pulse present until 12/31/24 @ 0000. MD had left room, would not say to not do CPR but also did not withdraw care. Unable to get ahold of family nor Pulmonary during the Code blue.

## 2024-12-31 NOTE — PROGRESS NOTES
Patients Niece here to get belongings, Donavon from [employer] gave niece patients purse, clothes, glasses, and one bag of misc. Items -

## 2024-12-31 NOTE — PROGRESS NOTES
ABG and VBG results sent to MD. Requested to give bicarb. MD said to see if sedation will help. No new orders at this time.     Latest Reference Range & Units 12/30/24 22:28   pH, Arterial 7.350 - 7.450  7.401   pCO2, Arterial 35.0 - 45.0 mm Hg 26.6 (L)   pO2, Arterial 75.0 - 108.0 mm Hg 78.9   HCO3, Arterial 21.0 - 29.0 mmol/L 16.5 (L)   TCO2 (calc), Art Not Established mmol/L 17   Base Excess, Arterial -3 - 3  -8 (L)   O2 Sat, Arterial 93 - 100 % 96   (L): Data is abnormally low       Latest Reference Range & Units 12/30/24 21:50   pH, Perfecto 7.350 - 7.450  7.292 (L)   pCO2, Perfecto 40.0 - 50.0 mmHg 39.3 (L)   pO2, Perfecto 25.0 - 40.0 mmHg 41.7 (H)   Bicarbonate, Venous 23.0 - 29.0 mmol/L 18.9 (L)   TC02 (Calc), Perfecto Not Established mmol/L 45   Base Excess, Perfecto -3.0 - 3.0 mmol/L -7.1 (L)   O2 Content, Perfecto Not Established VOL % 9   MetHgb, Perfecto <1.5 % 0.6   O2 Saturation Venous Not Established % 73   (L): Data is abnormally low  (H): Data is abnormally high        Electronically signed by Yari Moy RN on 12/31/2024 at 12:52 AM

## 2024-12-31 NOTE — CODE DOCUMENTATION
2328: Code start, CPR initiated  2329: 50 mg Bicarb given  2330: 50mg Bicarb given  2330: Pulse check, PEA CPR restarted  2331: 1mg Epi given  2333: pulse check, Vfib- shocked 200J and CPR restarted  2334: 1mg EPI given  2334: Vfib- shocked 200J CPR restarted  2336: pulse check, PEA CPR restarted  2337: 1mg Epi given  2338: pulse check:L PEA CPR restarted  2339: 1mg Epi  2340: intubated, 7.5 ETT, positive color change noted  2341: pulse check- PEA, CPR restarted  2342: 1mg Epi given  2343: Calcium chloride given  2343: 50mg Bicarb given  2344: pulse check, PEA, CPR restarted  2346: 1mg EPi given  2347: pulse check, doppler pulse heard. MD stopped Code  [id number]. Fleeting femoral doppler pulse heard. Heart tones auscultated. Small movements seen by RNs. PEA @ 0000.

## 2024-12-31 NOTE — PROGRESS NOTES
Physician Progress Note      PATIENT:               MAMADOU NEVAREZ  [facility] #:                  [id number]  :                       1945  ADMIT DATE:       2024 4:51 PM  DISCH DATE:        2024 6:37 AM  RESPONDING  PROVIDER #:        MAGNOLIA LARA PA-C          QUERY TEXT:    Internal Medicine,    Patient admitted with acute respiratory failure due to Influenza and noted to   have troponin elevation.   If possible, please document in the progress notes   and discharge summary if you are evaluating and/or treating any of the   following:    The medical record reflects the following:  Risk Factors: resp failure, PNA, a fib  Clinical Indicators: Troponin elevation, initial EKG shows-Sinus rhythm, rate   60, nonspecific intraventricular conduction delay, nonspecific ST-T wave   abnormality, Repeat EKG showed-Atrial fibrillation with rapid ventricular   response, Low voltage QRS Septal infarct , age undetermined Abnormal ECG  Echo shows severely dilated LA  Treatment: labs, imaging, EKG, ECHO, EP consult, medical management    Thank you  Options provided:  -- NSTEMI  -- Type 2 MI  -- Demand Ischemia with MI  -- Demand Ischemia only, no MI  -- Other - I will add my own diagnosis  -- Disagree - Not applicable / Not valid  -- Disagree - Clinically unable to determine / Unknown  -- Refer to Clinical Documentation Reviewer    PROVIDER RESPONSE TEXT:    This patient has demand ischemia only, no MI.    Query created by: Kerry Lam on 2024 3:44 PM      Electronically signed by:  MAGNOLIA LARA PA-C 2024 11:09 AM

## 2024-12-31 NOTE — PROGRESS NOTES
Spoke with Patrick hartley to make aware of patient passing. Patrick will call back with future arrangements regarding Vane Vini.     Electronically signed by Yari Moy RN on 12/31/2024 at 6:07 AM

## 2024-12-31 NOTE — PROGRESS NOTES
CODE BLUE called when patient became unresponsive.    CPR already in progress at time of my arrival.  Initial rhythm was said to have been PEA arrest.  Patient continued to receive high-quality CPR with chest compressions and airway management with positive pressure ventilation by ambo bagging.  She received IV epinephrine every 3 minutes with pulse checks in between per ACLS protocol.    Patient was noted to be in V-fib and defibrillation was ordered.  She continued to be in V-fib and she had another session of defibrillation.    For the rest of her code she remained in PEA. Confirmed by dopplers which was held in place by RN during chest compressions.    She also received IV bicarb x 3 total and calcium gluconate x 1.    After 6 rounds of epinephrine without ROSC, due to significant probability of poor neurological outcome futility was determined and code was discontinued at 23.47    Some RN believed they hear heart tones and dopplerable femoral pulses. I reevaluated for pulses with said RN. No dopplerable tones heard.    No heart sounds heard on auscultation.

## 2024-12-31 NOTE — PROGRESS NOTES
Messaged MD to get something on board to help with patient's RR and anxiety while on bipap. MD presented to see patient at bedside with this RN and respiratory therapist. Orders to start precedex gtt and to obtain a VBG given.     Electronically signed by Yari Moy RN on 12/31/2024 at 12:55 AM

## 2024-12-31 NOTE — PROGRESS NOTES
Hospitalist Progress Note    Name:  Vane Martini    /Age/Sex: 1945  (79 y.o. female)  MRN & CSN:  [id number] & [id number]    PCP: No primary care provider on file.    Date of Admission: 2024    Patient Status:  Inpatient     Chief Complaint:   Chief Complaint   Patient presents with    Fall     Mobile ems from home due to unwitnessed fall at home, per EMS, pt was on the ground fo 2 hours. Per pt, she tripped and fell. Denies LOC. Also, per EMS, pt was SOB upon arrival with O2 in the 60s, pt was given duoneb en route. Pt alert and oriented during triage        Hospital Course:   Patient started feeling poorly with increased SOB on . She fell and was too weak to get up. Her sister found her. She has no children and lives alone.      She was on 3.5L when I saw her this am. Sats were 86%. Went into rapid afib at 8:30. Had neb tx shortly before that. She does not feel any worse today than yesterday. Denies pain. Has occasional cough.       She is noted to be flu  A positive   Patient was transferred to the ICU for worsening respiratory failure   She was placed on BiPAP    Subjective:  Today is:  Hospital Day: 7.  Patient seen and examined in [id number]-[id number].     Still not feeling better, feels that she is just as SOB as she;been        Medications:  Reviewed    Infusion Medications    norepinephrine Stopped (24)    heparin (PORCINE) Infusion 13 Units/kg/hr (24 1317)    dextrose 50 mL/hr at 24 1419    sodium chloride       Scheduled Medications    methylPREDNISolone  40 mg IntraVENous Daily    ampicillin-sulbactam  3,000 mg IntraVENous Q6H    budesonide  0.5 mg Nebulization BID RT    arformoterol tartrate  15 mcg Nebulization BID RT    sodium chloride flush  5-40 mL IntraVENous 2 times per day    pantoprazole  40 mg Oral QAM AC    ipratropium 0.5 mg-albuterol 2.5 mg  1 Dose Inhalation Q4H WA RT    amiodarone  200 mg Oral Daily    [Held by provider] empagliflozin  10 mg  for pressor support,  She has a PICC           Discussed management of the case with ccm  who recommended continue current care        Drugs that require monitoring for toxicity include: Eliquis and the method of monitoring was/is CBC for platelet abnormality    DVT ppx:  eliquis  GI ppx: PPI  Diet: Diet NPO  Code Status: Full Code    PT/OT Eval Status: eval and treat     Disposition:     She Sounds a little worse and she still feels SOB  Heparin gtt for now but should be able to go back to [facility] if no significant bleeding.  Hemoptysis was minimal            Barney Connolly MD  12/30/2024  8:19 PM

## 2025-01-02 NOTE — DISCHARGE SUMMARY
Hospital Medicine Discharge Summary    Name:  Vane Martini  Gender: female  : 1945  79 y.o.  MRN: [id number]    PCP: No primary care provider on file.     Date of Admission:  2024  4:51 PM  Discharge Date: 2025    Admitting Physician: Cameron Abreu MD  Discharge Physician: Christian Jacobs DO    Communication to PCP  -      Discharge Diagnoses:       Active Hospital Problems    Diagnosis     Fall [W19.XXXA]     Generalized weakness [R53.1]     Atrial fibrillation with rapid ventricular response (HCC) [I48.91]        The patient was seen and examined on day of discharge and this discharge summary is in conjunction with any daily progress note from day of discharge.    Hospital Course:  Vane Martini is a 79 y.o. year old female who presented to [facility] on 2024  4:51 PM.      Patient started feeling poorly with increased SOB on . She fell and was too weak to get up. Her sister found her. She has no children and lives alone.      She was on 3.5L when I saw her this am. Sats were 86%. Went into rapid afib at 8:30. Had neb tx shortly before that. She does not feel any worse today than yesterday. Denies pain. Has occasional cough.       She is noted to be flu  A positive   Patient was transferred to the ICU for worsening respiratory failure   She was placed on BiPAP.      JAMIN ALBA called when patient became unresponsive on .     CPR already in progress at time of my arrival.  Initial rhythm was said to have been PEA arrest.  Patient continued to receive high-quality CPR with chest compressions and airway management with positive pressure ventilation by ambo bagging.  She received IV epinephrine every 3 minutes with pulse checks in between per ACLS protocol.     Patient was noted to be in V-fib and defibrillation was ordered.  She continued to be in V-fib and she had another session of defibrillation.     For the rest of her code she remained in PEA. Confirmed  by dopplers which was held in place by RN during chest compressions.     She also received IV bicarb x 3 total and calcium gluconate x 1.     After 6 rounds of epinephrine without ROSC, due to significant probability of poor neurological outcome futility was determined and code was discontinued at 23.47     Some RN believed they hear heart tones and dopplerable femoral pulses. I reevaluated for pulses with said RN. No dopplerable tones heard.     No heart sounds heard on auscultation.    On the last day of hospital stay, patient .      Physical Exam Performed:     BP (!) 233/130   Pulse (!) 0   Temp (!) 96 °F (35.6 °C) (Temporal)   Resp (!) 0   Ht 1.6 m (5' 3\")   Wt 57.8 kg (127 lb 6.8 oz)   SpO2 (!) 22%   BMI 22.57 kg/m²             Labs: For convenience and continuity at follow-up the following most recent labs are provided:      CBC:    Lab Results   Component Value Date/Time    WBC 13.5 2024 05:00 AM    HGB 12.1 2024 05:00 AM    HCT 35.3 2024 05:00 AM     2024 05:00 AM       Renal:    Lab Results   Component Value Date/Time     2024 05:00 AM    K 3.2 2024 05:00 AM    K 3.8 2024 05:18 AM     2024 05:00 AM    CO2 26 2024 05:00 AM    BUN 10 2024 05:00 AM    CREATININE 0.6 2024 05:00 AM    CALCIUM 8.1 2024 05:00 AM    PHOS 4.1 2023 07:07 AM         Significant Diagnostic Studies    Radiology:   XR CHEST PORTABLE   Final Result   Bilateral pleuroparenchymal disease, increased on the left compared to   prior.There is underlying emphysema         XR CHEST PORTABLE   Final Result   New right greater than left multifocal fluffy airspace opacities. Findings   are concerning for multifocal pneumonia.         CT ABDOMEN PELVIS WO CONTRAST Additional Contrast? None   Final Result   1. No acute intra-abdominal or pelvic process.   2. Infrarenal abdominal aortic aneurysm measures 4.7 cm in diameter.   Recommend

## 2025-03-13 NOTE — PLAN OF CARE
Problem: Chronic Conditions and Co-morbidities  Goal: Patient's chronic conditions and co-morbidity symptoms are monitored and maintained or improved  Outcome: Progressing     Problem: Safety - Adult  Goal: Free from fall injury  Outcome: Progressing     Problem: Respiratory - Adult  Goal: Achieves optimal ventilation and oxygenation  Outcome: Progressing     Problem: Cardiovascular - Adult  Goal: Maintains optimal cardiac output and hemodynamic stability  Outcome: Progressing  Goal: Absence of cardiac dysrhythmias or at baseline  Outcome: Progressing     Problem: Musculoskeletal - Adult  Goal: Return mobility to safest level of function  Outcome: Progressing     Problem: Skin/Tissue Integrity  Goal: Absence of new skin breakdown  Description: 1.  Monitor for areas of redness and/or skin breakdown  2.  Assess vascular access sites hourly  3.  Every 4-6 hours minimum:  Change oxygen saturation probe site  4.  Every 4-6 hours:  If on nasal continuous positive airway pressure, respiratory therapy assess nares and determine need for appliance change or resting period.  Outcome: Progressing      Patient picked up HST on 3/12 and returned on 3/13. Data was not sufficent to use. Need to repeat.  Charge was removed.  Please enter a new order.